# Patient Record
Sex: FEMALE | Race: BLACK OR AFRICAN AMERICAN | Employment: OTHER | URBAN - METROPOLITAN AREA
[De-identification: names, ages, dates, MRNs, and addresses within clinical notes are randomized per-mention and may not be internally consistent; named-entity substitution may affect disease eponyms.]

---

## 2017-01-01 ENCOUNTER — HOSPITAL ENCOUNTER (EMERGENCY)
Age: 72
Discharge: HOME | End: 2017-01-01
Attending: FAMILY MEDICINE
Payer: MEDICARE

## 2017-01-01 ENCOUNTER — APPOINTMENT (OUTPATIENT)
Dept: CT IMAGING | Age: 72
End: 2017-01-01
Attending: FAMILY MEDICINE
Payer: MEDICARE

## 2017-01-01 ENCOUNTER — APPOINTMENT (OUTPATIENT)
Dept: GENERAL RADIOLOGY | Age: 72
End: 2017-01-01
Attending: FAMILY MEDICINE
Payer: MEDICARE

## 2017-01-01 VITALS
SYSTOLIC BLOOD PRESSURE: 142 MMHG | HEIGHT: 59 IN | BODY MASS INDEX: 29.64 KG/M2 | HEART RATE: 77 BPM | DIASTOLIC BLOOD PRESSURE: 89 MMHG | TEMPERATURE: 97.4 F | RESPIRATION RATE: 16 BRPM | WEIGHT: 147 LBS | OXYGEN SATURATION: 99 %

## 2017-01-01 DIAGNOSIS — K62.89 PROCTITIS: ICD-10-CM

## 2017-01-01 DIAGNOSIS — K59.01 SLOW TRANSIT CONSTIPATION: Primary | ICD-10-CM

## 2017-01-01 LAB
ALBUMIN SERPL BCP-MCNC: 3.4 G/DL (ref 3.4–5)
ALBUMIN/GLOB SERPL: 0.8 {RATIO} (ref 0.8–1.7)
ALP SERPL-CCNC: 101 U/L (ref 45–117)
ALT SERPL-CCNC: 31 U/L (ref 13–56)
ANION GAP BLD CALC-SCNC: 10 MMOL/L (ref 3–18)
APPEARANCE UR: ABNORMAL
AST SERPL W P-5'-P-CCNC: 23 U/L (ref 15–37)
BACTERIA URNS QL MICRO: NEGATIVE /HPF
BASOPHILS # BLD AUTO: 0 K/UL (ref 0–0.06)
BASOPHILS # BLD: 0 % (ref 0–2)
BILIRUB SERPL-MCNC: 0.3 MG/DL (ref 0.2–1)
BILIRUB UR QL: NEGATIVE
BUN SERPL-MCNC: 27 MG/DL (ref 7–18)
BUN/CREAT SERPL: 18 (ref 12–20)
CALCIUM SERPL-MCNC: 9.4 MG/DL (ref 8.5–10.1)
CHLORIDE SERPL-SCNC: 102 MMOL/L (ref 100–108)
CO2 SERPL-SCNC: 24 MMOL/L (ref 21–32)
COLOR UR: YELLOW
CREAT SERPL-MCNC: 1.51 MG/DL (ref 0.6–1.3)
DIFFERENTIAL METHOD BLD: ABNORMAL
EOSINOPHIL # BLD: 0.3 K/UL (ref 0–0.4)
EOSINOPHIL NFR BLD: 3 % (ref 0–5)
EPITH CASTS URNS QL MICRO: NORMAL /LPF (ref 0–5)
ERYTHROCYTE [DISTWIDTH] IN BLOOD BY AUTOMATED COUNT: 13.6 % (ref 11.6–14.5)
GLOBULIN SER CALC-MCNC: 4.3 G/DL (ref 2–4)
GLUCOSE SERPL-MCNC: 105 MG/DL (ref 74–99)
GLUCOSE UR STRIP.AUTO-MCNC: NEGATIVE MG/DL
HCT VFR BLD AUTO: 45.8 % (ref 35–45)
HGB BLD-MCNC: 15.2 G/DL (ref 12–16)
HGB UR QL STRIP: NEGATIVE
KETONES UR QL STRIP.AUTO: NEGATIVE MG/DL
LEUKOCYTE ESTERASE UR QL STRIP.AUTO: ABNORMAL
LIPASE SERPL-CCNC: 146 U/L (ref 73–393)
LYMPHOCYTES # BLD AUTO: 29 % (ref 21–52)
LYMPHOCYTES # BLD: 2.6 K/UL (ref 0.9–3.6)
MCH RBC QN AUTO: 29.6 PG (ref 24–34)
MCHC RBC AUTO-ENTMCNC: 33.2 G/DL (ref 31–37)
MCV RBC AUTO: 89.3 FL (ref 74–97)
MONOCYTES # BLD: 0.6 K/UL (ref 0.05–1.2)
MONOCYTES NFR BLD AUTO: 7 % (ref 3–10)
NEUTS SEG # BLD: 5.6 K/UL (ref 1.8–8)
NEUTS SEG NFR BLD AUTO: 61 % (ref 40–73)
NITRITE UR QL STRIP.AUTO: NEGATIVE
PH UR STRIP: 6.5 [PH] (ref 5–8)
PLATELET # BLD AUTO: 335 K/UL (ref 135–420)
PMV BLD AUTO: 9.2 FL (ref 9.2–11.8)
POTASSIUM SERPL-SCNC: 4.3 MMOL/L (ref 3.5–5.5)
PROT SERPL-MCNC: 7.7 G/DL (ref 6.4–8.2)
PROT UR STRIP-MCNC: NEGATIVE MG/DL
RBC # BLD AUTO: 5.13 M/UL (ref 4.2–5.3)
RBC #/AREA URNS HPF: NORMAL /HPF (ref 0–5)
SODIUM SERPL-SCNC: 136 MMOL/L (ref 136–145)
SP GR UR REFRACTOMETRY: 1.01 (ref 1–1.03)
UROBILINOGEN UR QL STRIP.AUTO: 0.2 EU/DL (ref 0.2–1)
WBC # BLD AUTO: 9.1 K/UL (ref 4.6–13.2)
WBC URNS QL MICRO: NORMAL /HPF (ref 0–5)

## 2017-01-01 PROCEDURE — 99284 EMERGENCY DEPT VISIT MOD MDM: CPT

## 2017-01-01 PROCEDURE — 80053 COMPREHEN METABOLIC PANEL: CPT | Performed by: FAMILY MEDICINE

## 2017-01-01 PROCEDURE — 81001 URINALYSIS AUTO W/SCOPE: CPT | Performed by: FAMILY MEDICINE

## 2017-01-01 PROCEDURE — 74011250636 HC RX REV CODE- 250/636: Performed by: FAMILY MEDICINE

## 2017-01-01 PROCEDURE — 74011636320 HC RX REV CODE- 636/320: Performed by: EMERGENCY MEDICINE

## 2017-01-01 PROCEDURE — 74022 RADEX COMPL AQT ABD SERIES: CPT

## 2017-01-01 PROCEDURE — 74177 CT ABD & PELVIS W/CONTRAST: CPT

## 2017-01-01 PROCEDURE — 96375 TX/PRO/DX INJ NEW DRUG ADDON: CPT

## 2017-01-01 PROCEDURE — 96361 HYDRATE IV INFUSION ADD-ON: CPT

## 2017-01-01 PROCEDURE — 96374 THER/PROPH/DIAG INJ IV PUSH: CPT

## 2017-01-01 PROCEDURE — 83690 ASSAY OF LIPASE: CPT | Performed by: FAMILY MEDICINE

## 2017-01-01 PROCEDURE — 85025 COMPLETE CBC W/AUTO DIFF WBC: CPT | Performed by: FAMILY MEDICINE

## 2017-01-01 RX ORDER — IODIXANOL 320 MG/ML
80 INJECTION, SOLUTION INTRAVASCULAR
Status: CANCELLED | OUTPATIENT
Start: 2017-01-01 | End: 2017-01-01

## 2017-01-01 RX ORDER — IODIXANOL 320 MG/ML
50-100 INJECTION, SOLUTION INTRAVASCULAR
Status: COMPLETED | OUTPATIENT
Start: 2017-01-01 | End: 2017-01-01

## 2017-01-01 RX ORDER — HYDROCORTISONE 100 MG/60ML
100 SUSPENSION RECTAL 2 TIMES DAILY
Qty: 10 ENEMA | Refills: 0 | Status: SHIPPED | OUTPATIENT
Start: 2017-01-01 | End: 2017-01-06

## 2017-01-01 RX ORDER — SUCRALFATE 1 G/1
1 TABLET ORAL 4 TIMES DAILY
Qty: 60 TAB | Refills: 2 | Status: ON HOLD | OUTPATIENT
Start: 2017-01-01 | End: 2021-07-07 | Stop reason: SDUPTHER

## 2017-01-01 RX ORDER — MORPHINE SULFATE 2 MG/ML
2 INJECTION, SOLUTION INTRAMUSCULAR; INTRAVENOUS ONCE
Status: COMPLETED | OUTPATIENT
Start: 2017-01-01 | End: 2017-01-01

## 2017-01-01 RX ORDER — METRONIDAZOLE 500 MG/1
500 TABLET ORAL 3 TIMES DAILY
Qty: 21 TAB | Refills: 0 | Status: SHIPPED | OUTPATIENT
Start: 2017-01-01 | End: 2017-01-08

## 2017-01-01 RX ORDER — CIPROFLOXACIN 500 MG/1
500 TABLET ORAL 2 TIMES DAILY
Qty: 14 TAB | Refills: 0 | Status: SHIPPED | OUTPATIENT
Start: 2017-01-01 | End: 2017-01-08

## 2017-01-01 RX ORDER — ONDANSETRON 2 MG/ML
4 INJECTION INTRAMUSCULAR; INTRAVENOUS ONCE
Status: COMPLETED | OUTPATIENT
Start: 2017-01-01 | End: 2017-01-01

## 2017-01-01 RX ADMIN — ONDANSETRON 4 MG: 2 INJECTION INTRAMUSCULAR; INTRAVENOUS at 05:37

## 2017-01-01 RX ADMIN — IODIXANOL 75 ML: 320 INJECTION, SOLUTION INTRAVASCULAR at 07:29

## 2017-01-01 RX ADMIN — SODIUM CHLORIDE 1000 ML: 900 INJECTION, SOLUTION INTRAVENOUS at 06:55

## 2017-01-01 RX ADMIN — SODIUM CHLORIDE 1000 ML: 900 INJECTION, SOLUTION INTRAVENOUS at 05:37

## 2017-01-01 RX ADMIN — Medication 2 MG: at 05:37

## 2017-01-01 NOTE — ED NOTES
I have reviewed discharge instructions with the patient. The patient verbalized understanding.  Patient armband removed and shredded

## 2017-01-01 NOTE — ED NOTES
Hourly Rounding:  Pt resting in NAD, RR equal and non-labored, side rails up x 2, bed in lowest position, call bell within reach, VS updated, pt states still unable to void, no needs at this time, pt son remains at bedside.

## 2017-01-01 NOTE — ED PROVIDER NOTES
[address]  EMERGENCY DEPARTMENT HISTORY AND PHYSICAL EXAM       Date: 1/1/2017   Patient Name: Jayden Simmons   YOB: 1945  Medical Record Number: [id number]    History of Presenting Illness     Chief Complaint   Patient presents with    Constipation        History Provided By:  patient    Additional History:   5:13 AM  Jayden Simmons is a 73 y.o. female who presents to the emergency department C/O abdominal pain (Rated 10/10) onset earlier today. Associated symptoms include constipation and blood in stool. States she has tried taking a suppository Dulcolax with no relief. Pt just had a small BM in ED with dark red blood. Pt has had similar pain before and was told to take stool softener. PMHx of HTN, HLD, and blood clots. PSHx of abdominal tumor removal. Pt is currently taking Aspirin daily. Pt denies fever, chills, N/V, tobacco use, EtOH use, and any other Sx or complaints. Primary Care Provider: Donell Parekh MD   Specialist:    Past History     Past Medical History:   Past Medical History   Diagnosis Date    Arthritis     Blood clot in vein 2004     left leg     [facility]     Cancer ([facility]      right breast    GERD (gastroesophageal reflux disease)     High cholesterol     Hypertension     Other ill-defined conditions(799.89)      high cholestrol    Vertigo         Past Surgical History:   Past Surgical History   Procedure Laterality Date    Hx knee replacement      Hx breast lumpectomy      Hx tumor removal      Pr abdomen surgery proc unlisted       removal of tumor abdomen    Hx orthopaedic       total knee replacement bilateral    Hx orthopaedic       excision of cyst left hand    Hx tubal ligation      Hx gi       colonosocpy        Family History:   History reviewed. No pertinent family history.      Social History:   Social History   Substance Use Topics    Smoking status: Passive Smoke Exposure - Never Smoker    Smokeless tobacco: Never Used    Alcohol use No        Allergies: Allergies   Allergen Reactions    Amiloride Nausea and Vomiting    Amoxicillin Rash    Bactrim [Sulfamethoprim Ds] Unknown (comments)    Gabapentin Rash    Ibuprofen Unknown (comments)    Zestoretic [Lisinopril-Hydrochlorothiazide] Unknown (comments)        Review of Systems   Review of Systems   Constitutional: Negative for chills and fever. Gastrointestinal: Positive for abdominal pain, blood in stool and constipation. Negative for nausea and vomiting. All other systems reviewed and are negative. Physical Exam  Vitals:    01/01/17 0500 01/01/17 0532 01/01/17 0651   BP: (!) 138/94 138/89 135/75   Pulse: 96 80 77   Resp: 18 16 16   Temp: 97.4 °F (36.3 °C)     SpO2: 100% 100% 100%   Weight: 66.7 kg (147 lb)     Height: 4' 11\" (1.499 m)         Physical Exam   Nursing note and vitals reviewed. Vital signs and nursing notes reviewed    CONSTITUTIONAL: Short. Middle aged, over weight female. Alert, in moderate pain; well-developed; well-nourished. HEAD:  Normocephalic, atraumatic  EYES: PERRL; EOM's intact. ENTM: Nose: no rhinorrhea; Throat: no erythema or exudate, mucous membranes moist  Neck:  No JVD, supple without lymphadenopathy  RESP: Chest clear, equal breath sounds. CV: S1 and S2 WNL; No murmurs, gallops or rubs. GI: Normal bowel sounds, abdomen soft and tenderness in lower abdomen. Surgical scar on abdomen. No masses or organomegaly. RECTAL: No masses, no hemorrhoids. : No costo-vertebral angle tenderness. BACK:  Non-tender  UPPER EXT:  Normal inspection  LOWER EXT: No edema, no calf tenderness. Distal pulses intact. NEURO: CN intact, reflexes 2/4 and sym, strength 5/5 and sym, sensation intact. SKIN: No rashes; Normal for age and stage. PSYCH:  Alert and oriented, normal affect.         Diagnostic Study Results     Labs -      Recent Results (from the past 12 hour(s))   CBC WITH AUTOMATED DIFF    Collection Time: 01/01/17 5:38 AM   Result Value Ref Range    WBC 9.1 4.6 - 13.2 K/uL    RBC 5.13 4.20 - 5.30 M/uL    HGB 15.2 12.0 - 16.0 g/dL    HCT 45.8 (H) 35.0 - 45.0 %    MCV 89.3 74.0 - 97.0 FL    MCH 29.6 24.0 - 34.0 PG    MCHC 33.2 31.0 - 37.0 g/dL    RDW 13.6 11.6 - 14.5 %    PLATELET 245 433 - 890 K/uL    MPV 9.2 9.2 - 11.8 FL    NEUTROPHILS 61 40 - 73 %    LYMPHOCYTES 29 21 - 52 %    MONOCYTES 7 3 - 10 %    EOSINOPHILS 3 0 - 5 %    BASOPHILS 0 0 - 2 %    ABS. NEUTROPHILS 5.6 1.8 - 8.0 K/UL    ABS. LYMPHOCYTES 2.6 0.9 - 3.6 K/UL    ABS. MONOCYTES 0.6 0.05 - 1.2 K/UL    ABS. EOSINOPHILS 0.3 0.0 - 0.4 K/UL    ABS. BASOPHILS 0.0 0.0 - 0.06 K/UL    DF AUTOMATED     METABOLIC PANEL, COMPREHENSIVE    Collection Time: 01/01/17  5:38 AM   Result Value Ref Range    Sodium 136 136 - 145 mmol/L    Potassium 4.3 3.5 - 5.5 mmol/L    Chloride 102 100 - 108 mmol/L    CO2 24 21 - 32 mmol/L    Anion gap 10 3.0 - 18 mmol/L    Glucose 105 (H) 74 - 99 mg/dL    BUN 27 (H) 7.0 - 18 MG/DL    Creatinine 1.51 (H) 0.6 - 1.3 MG/DL    BUN/Creatinine ratio 18 12 - 20      GFR est AA 41 (L) >60 ml/min/1.73m2    GFR est non-AA 34 (L) >60 ml/min/1.73m2    Calcium 9.4 8.5 - 10.1 MG/DL    Bilirubin, total 0.3 0.2 - 1.0 MG/DL    ALT 31 13 - 56 U/L    AST 23 15 - 37 U/L    Alk.  phosphatase 101 45 - 117 U/L    Protein, total 7.7 6.4 - 8.2 g/dL    Albumin 3.4 3.4 - 5.0 g/dL    Globulin 4.3 (H) 2.0 - 4.0 g/dL    A-G Ratio 0.8 0.8 - 1.7     LIPASE    Collection Time: 01/01/17  5:38 AM   Result Value Ref Range    Lipase 146 73 - 393 U/L   URINALYSIS W/ RFLX MICROSCOPIC    Collection Time: 01/01/17  7:36 AM   Result Value Ref Range    Color YELLOW      Appearance CLOUDY      Specific gravity 1.012 1.005 - 1.030      pH (UA) 6.5 5.0 - 8.0      Protein NEGATIVE  NEG mg/dL    Glucose NEGATIVE  NEG mg/dL    Ketone NEGATIVE  NEG mg/dL    Bilirubin NEGATIVE  NEG      Blood NEGATIVE  NEG      Urobilinogen 0.2 0.2 - 1.0 EU/dL    Nitrites NEGATIVE  NEG      Leukocyte Esterase TRACE (A) NEG     URINE MICROSCOPIC ONLY    Collection Time: 01/01/17  7:36 AM   Result Value Ref Range    WBC 0 to 1 0 - 5 /hpf    RBC NONE 0 - 5 /hpf    Epithelial cells FEW 0 - 5 /lpf    Bacteria NEGATIVE  NEG /hpf       Radiologic Studies -    RADIOLOGY FINDINGS  X-ray abdomen with chest shows no acute process. Moderate stool noted. Pending review by Radiologist  Recorded by Leonard Hubbard, CLAUDE Scribe, as dictated by Lakeisha Clark MD    CT ABD PELV W CONT   Final Result   Circumferential mural thickening throughout the rectum and anus with adjacent   presacral inflammatory stranding. Findings are most suggestive of proctitis,  probably infectious. No acute abnormality otherwise throughout the abdomen or  Pelvis. As read by the radiologist.     XR ABD ACUTE W 1 V CHEST    (Results Pending)     Medical Decision Making   I am the first provider for this patient. I reviewed the vital signs, available nursing notes, past medical history, past surgical history, family history and social history. INITIAL CLINICAL IMPRESSION and PLANS:  The patient presents with the primary complaint(s) of: abdominal pain. The presentation, to include historical aspects and clinical findings are consistent with the DX of constipation. However, other possible DX's to consider as primary, associated with, or exacerbated by include:    1. Diverticulitis  2. UTI  3. Bowel obstruction    Considering the above, my initial management plan to evaluate and therapeutic interventions include the following and as noted in the orders:    1. Labs: CBC, CMP, Lipase, UA  2. Imaging: XR abdomen with chest, CT A/P    Vital Signs-Reviewed the patient's vital signs.    Patient Vitals for the past 12 hrs:   Temp Pulse Resp BP SpO2   01/01/17 0651 - 77 16 135/75 100 %   01/01/17 0532 - 80 16 138/89 100 %   01/01/17 0500 97.4 °F (36.3 °C) 96 18 (!) 138/94 100 %       Pulse Oximetry Analysis - Normal 100% on room air     Old Medical Records: Old medical records. Procedure:    PROCEDURE NOTE - RECTAL EXAM:   6:29 AM  Performed by: Mook Mesa MD  Rectal exam performed. Brown stool was collected. Stool was Hemoccult tested, and found to be heme Negative. The procedure took 1-15 minutes, and pt tolerated well. ED Course:      Medications Given in the ED:  Medications   sodium chloride 0.9 % bolus infusion 1,000 mL (0 mL IntraVENous IV Completed 17 0651)   morphine injection 2 mg (2 mg IntraVENous Given 17 0537)   ondansetron (ZOFRAN) injection 4 mg (4 mg IntraVENous Given 17 0537)   sodium chloride 0.9 % bolus infusion 1,000 mL (0 mL IntraVENous IV Completed 17 0752)   iodixanol (VISIPAQUE) 320 mg iodine/mL contrast injection  mL (75 mL IntraVENous Given 17 7753)        PROGRESS NOTE:  5:13 AM   Initial assessment performed. PROGRESS NOTE:   6:33 AM  Pt has been re-examined by Mook Mesa MD. Will get CT A/P scan to rule out diverticulitis. BEDSIDE TRANSFER OF CARE:  7:00 AM  Patient care will be transferred from Mook Mesa MD to Gillian Duggan MD.  Discussed available diagnostic results and care plan at length at beside. Patient and family made aware of provider change. All questions answered. Patient and family voiced understanding. Written by Agueda Morales ED Scribe, as dictated by Mook Mesa MD.    Discharge Note:  8:59 AM   Pt has been reexamined. Patient has no new complaints, changes, or physical findings. Care plan outlined and precautions discussed. Results were reviewed with the patient. All medications were reviewed with the patient; will d/c home with discharge instructions and Cortenema, Flagyl, and Carafate. All of pt's questions and concerns were addressed. Patient was instructed and agrees to follow up with PCP, as well as to return to the ED upon further deterioration. Patient is ready to go home. Diagnosis   Clinical Impression:   1.  Slow transit constipation    2. Proctitis         Discussion:      Follow-up Information     Follow up With Details Comments Contact Info    Jenifer Landry MD Schedule an appointment as soon as possible for a visit in 2 days  [address] [facility] [address]      [facility] EMERGENCY DEPT  As needed, If symptoms worsen [address] [address]  [phone]          Current Discharge Medication List      START taking these medications    Details   metroNIDAZOLE (FLAGYL) 500 mg tablet Take 1 Tab by mouth three (3) times daily for 7 days. Qty: 21 Tab, Refills: 0      hydrocortisone (CORTENEMA) 100 mg/60 mL enema Insert 1 Enema into rectum two (2) times a day for 5 days. Qty: 10 Enema, Refills: 0      sucralfate (CARAFATE) 1 gram tablet Take 1 Tab by mouth four (4) times daily. Qty: 60 Tab, Refills: 2         CONTINUE these medications which have NOT CHANGED    Details   HYDROcodone-acetaminophen (NORCO) 5-325 mg per tablet Take 1 Tab by mouth every four (4) hours as needed for Pain. Max Daily Amount: 6 Tabs. Qty: 20 Tab, Refills: 0      bisacodyl (DULCOLAX, BISACODYL,) 10 mg suppository Insert 10 mg into rectum daily. Qty: 12 suppository, Refills: 0      nortriptyline (PAMELOR) 25 mg capsule Take 25 mg by mouth nightly. glycerin, adult, suppository Insert 1 suppository into rectum daily as needed (constipation). Qty: 5 suppository, Refills: 0      lovastatin (MEVACOR) 40 mg tablet Take 40 mg by mouth nightly. cyclobenzaprine (FLEXERIL) 10 mg tablet Take 10 mg by mouth three (3) times daily as needed for Muscle Spasm(s). aspirin (ASPIRIN) 325 mg tablet Take 325 mg by mouth daily. losartan (COZAAR) 100 mg tablet Take 100 mg by mouth daily. famotidine (PEPCID) 20 mg tablet Take 20 mg by mouth two (2) times a day.        ezetimibe (ZETIA) 10 mg tablet Take 10 mg by mouth nightly.       promethazine (PHENERGAN) 25 mg tablet Take 1 Tab by mouth every six (6) hours as needed for Nausea. Qty: 12 Tab, Refills: 0      meclizine (ANTIVERT) 25 mg tablet One  q6h prn dizziness  Qty: 20 Tab, Refills: 1             _______________________________   Attestations: This note is prepared by Oscar Fraga, acting as a Scribe for Sandra Norton MD on 5:06 AM on 01/01/17    Sandra Norton MD: The scribe's documentation has been prepared under my direction and personally reviewed by me in its entirety.   _______________________________

## 2017-01-01 NOTE — ED NOTES
Pt c/o lower abdominal pain x 2 days. Pt states last BM was Saturday, and states \"I just had a sort of hard bowel movement, it was not a lot. \"  Pt states she also voided, so is unable to at present time. Pt is aware that urine specimen is needed.   Pt denies N/V.

## 2017-01-01 NOTE — Clinical Note
SPECIAL DISCHARGE INSTRUCTIONS AND COMMENTS: 
 
General comments: Thank you for allowing us to provide you with excellent care today. We hope we addressed all of your concerns and needs. We strive to provide excellent quality care in the Emergency Depart ment. If you feel that you have not received excellent quality care or timely care, please ask to speak to the nurse manager. Please choose us in the future for your continued health care needs. Follow-up comments: The exam and treatment you rece ived in the Emergency Department were for an urgent problem that may be new for you and / or one which may be related to a worsening of a chronic or ongoing medical problem that you already had prior to this visit. In any case, today's treatment is not i ntended to be considered as complete care in all cases and thus, it is important that you follow-up with a doctor, nurse practitioner, or physicians assistant to: (1) confirm your diagnosis, (2) re-evaluation of changes in your illness and treatment, an d (3) for ongoing care. In some cases we may have contacted your doctor or a specific specialist who may be involved in your future evaluation and care but in any case, take this sheet with you when you go to your follow-up visit or refer to the infor kylee on these sheets when you are calling to arrange an appointment - it may prove helpful in making the appointment. Prescribed Medications: Unless you have been directed by the provider to change your current medicines, you should continue to sheela e them as before. If you have been prescribed medicines, please take them as directed. In some cases, these new medicines are intended to replace a medicine that you are currently taking and if so, this will be noted below.  
 
 Our expectation is that y our condition will improve by following the doctor's recommendations, however, if in the event your condition worsens or does not improve as expected, please follow-up with your PCP or if unable to reach your usual health care provider, you should return  to the Emergency Department. We are available 24 hours a day. SPECIFIC INSTRUCTIONS PROVIDED BY YOUR DOCTOR, Laquita Cruz MD:  
SUGGESTIONS FOR TAKING CARE OF YOUR CONSTIPATION In the majority of cases, constipation is caused by the diet anatoly t you are on and / or medications that you are taking. In some cases, the bowels just don't work right. Regardless of the cause, the key to taking care of constipation is to prevent it. This is what you should consider: 
1) Drink more liquids 2) Eat m ore fruits and vegetable 3) Increase the amount of fiber in your diet, either by the foods you eat or supplemental fiber - METAMUCIL; COLONIX 
4) Consider taking COLACE, which will add bulk to your stool If you have already have some degree of consti pation, consider the followin) Laxatives: Milk of Magnesium; Prunes or Prune Juice, DULCOLAX; MIRALAX 2) Enemas If the constipation is really bad . .. Drastic measures are needed. If directed by the physician, consider the followin) Take 2 D ULCOLAX  Tabs at once followed 3 glasses of water. Then, 
2)  Prepare a high dose / concentration of Miralax by putting the entire contents of a standard bottle of Miralax (255 grams) in to 64 ounces of some sport drink, like Gatorade, and begin drinkin g 8 ounces every 15 minutes until the solution is gone. Another option is: after an hour of taking the Dulcolax  take a half of a bottle of MagCitrate with ice followed by water and then repeat in one hour. 3) After this continue drinking fluids a nd if there are no results, take an additional DULCOLAX tab. As an option to the Miralax (described above) you may want to consider taking a bottle of Magnesium Citrate - take 1/2 of the bottle with some ice and then repeat in 30 minutes. Drinking kiah er between these 2 doses. SUGGESTIONS FOR TAKING CARE OF YOUR CONSTIPATION In the majority of cases, constipation is caused by the diet that you are on and / or medications that you are taking. In some cases, the bowels just don't work right. Reg ardless of the cause, the key to taking care of constipation is to prevent it. This is what you should consider: 
1) Drink more liquids 2) Eat more fruits and vegetable 3) Increase the amount of fiber in your diet, either by the foods you eat or supple mental fiber - METAMUCIL; COLONIX 
4) Consider taking COLACE, which will add bulk to your stool

## 2017-01-01 NOTE — DISCHARGE INSTRUCTIONS
Anal Pain: Care Instructions  Your Care Instructions  Pain in the opening to the rectum (anus) can be caused by diarrhea or constipation or by scratching a rectal itch. A common cause of anal pain is a tear in the lining of the lower rectum (anal fissure). This type of anal pain usually goes away when the problem clears up. Injury during anal sex or from an object being placed in the rectum also can cause pain. A rare cause of anal pain is spasms of the muscles in the rectum. Some of these conditions may cause some light bleeding. Home treatment usually can relieve anal pain. If you continue to have anal pain, your doctor may prescribe medicine to relieve pain and other symptoms. Depending on the cause, you may need other treatment. Follow-up care is a key part of your treatment and safety. Be sure to make and go to all appointments, and call your doctor if you are having problems. It's also a good idea to know your test results and keep a list of the medicines you take. How can you care for yourself at home? · Sit in a few inches of warm water (sitz bath) 3 times a day and after bowel movements. The warm water eases discomfort. Do not put soaps, salts, or shampoos in the water. · Drink plenty of fluids, enough so that your urine is light yellow or clear like water. If you have kidney, heart, or liver disease and have to limit fluids, talk with your doctor before you increase the amount of fluids you drink. · Include high-fiber foods, such as fruits, vegetables, beans, and whole grains, in your diet each day. · Take a fiber supplement, such as Benefiber, Citrucel, or Metamucil, every day. Read and follow all instructions on the label. · Use the toilet when you feel the urge. Or when you can, schedule time each day for a bowel movement. A daily routine may help. Take your time and do not strain when having a bowel movement. But do not sit on the toilet too long.   · Support your feet with a small step stool when you sit on the toilet. This helps flex your hips and places your pelvis in a squatting position. · Your doctor may recommend an over-the-counter laxative, such as Miralax, Milk of Magnesia, or Ex-Lax. Read and follow all instructions on the label, and do not use laxatives on a long-term basis. · Do not use over-the-counter ointments or creams without talking to your doctor. Some of these may not help. · Use baby wipes or medicated pads, such as Preparation H or Tucks, instead of toilet paper to clean after a bowel movement. These products do not irritate the anus. · Be safe with medicines. Read and follow all instructions on the label. If the doctor gave you a prescription medicine for pain, take it as prescribed. If you are not taking a prescription pain medicine, ask your doctor if you can take an over-the-counter medicine. When should you call for help? Call 911 anytime you think you may need emergency care. For example, call if:  · You passed out (lost consciousness). Call your doctor now or seek immediate medical care if:  · You have a fever. · You have swelling, a lump, a sore, or a new growth in or around your anus. · Your stools are black and tarlike or have streaks of blood. Watch closely for changes in your health, and be sure to contact your doctor if:  · You do not get better as expected. Where can you learn more? Go to http://cathleen-aniyah.info/. Enter 612 [id number] in the search box to learn more about \"Anal Pain: Care Instructions. \"  Current as of: August 9, 2016  Content Version: 11.1  © 9715-1022 [employer]. Care instructions adapted under license by [employer] (which disclaims liability or warranty for this information). If you have questions about a medical condition or this instruction, always ask your healthcare professional. [address] any warranty or liability for your use of this information. Constipation: Care Instructions  Your Care Instructions  Constipation means that you have a hard time passing stools (bowel movements). People pass stools from 3 times a day to once every 3 days. What is normal for you may be different. Constipation may occur with pain in the rectum and cramping. The pain may get worse when you try to pass stools. Sometimes there are small amounts of bright red blood on toilet paper or the surface of stools. This is because of enlarged veins near the rectum (hemorrhoids). A few changes in your diet and lifestyle may help you avoid ongoing constipation. Your doctor may also prescribe medicine to help loosen your stool. Some medicines can cause constipation. These include pain medicines and antidepressants. Tell your doctor about all the medicines you take. Your doctor may want to make a medicine change to ease your symptoms. Follow-up care is a key part of your treatment and safety. Be sure to make and go to all appointments, and call your doctor if you are having problems. It's also a good idea to know your test results and keep a list of the medicines you take. How can you care for yourself at home? · Drink plenty of fluids, enough so that your urine is light yellow or clear like water. If you have kidney, heart, or liver disease and have to limit fluids, talk with your doctor before you increase the amount of fluids you drink. · Include high-fiber foods in your diet each day. These include fruits, vegetables, beans, and whole grains. · Get at least 30 minutes of exercise on most days of the week. Walking is a good choice. You also may want to do other activities, such as running, swimming, cycling, or playing tennis or team sports. · Take a fiber supplement, such as Citrucel or Metamucil, every day. Read and follow all instructions on the label. · Schedule time each day for a bowel movement. A daily routine may help. Take your time having your bowel movement.   · Support your feet with a small step stool when you sit on the toilet. This helps flex your hips and places your pelvis in a squatting position. · Your doctor may recommend an over-the-counter laxative to relieve your constipation. Examples are Milk of Magnesia and MiraLax. Read and follow all instructions on the label. Do not use laxatives on a long-term basis. When should you call for help? Call your doctor now or seek immediate medical care if:  · You have new or worse belly pain. · You have new or worse nausea or vomiting. · You have blood in your stools. Watch closely for changes in your health, and be sure to contact your doctor if:  · Your constipation is getting worse. · You do not get better as expected. Where can you learn more? Go to http://cathleen-aniyah.info/. Enter [phone] in the search box to learn more about \"Constipation: Care Instructions. \"  Current as of: May 27, 2016  Content Version: 11.1  © 5413-5075 [employer]. Care instructions adapted under license by [employer] (which disclaims liability or warranty for this information). If you have questions about a medical condition or this instruction, always ask your healthcare professional. [place] [address] any warranty or liability for your use of this information. Proctitis: Care Instructions  Your Care Instructions  Proctitis is inflammation of the lining of the rectum. It can be a short-term or long-term problem. Many things can cause proctitis. It may be a side effect of medical treatments, such as radiation therapy or antibiotics. Some sexually transmitted diseases may also cause proctitis. It may be related to ulcerative colitis or to Crohn's disease. Other causes include bacterial infection, allergies, or injury or nerve problems in the rectum. Common symptoms include pain or itching in the rectum and a constant or frequent strong need to have a bowel movement.  You may have a change in bowel habits; a fever; and mucus, blood, or pus in your stools. Follow-up care is a key part of your treatment and safety. Be sure to make and go to all appointments, and call your doctor if you are having problems. Its also a good idea to know your test results and keep a list of the medicines you take. How can you care for yourself at home? · Take your medicines exactly as prescribed. Call your doctor if you think you are having a problem with your medicine. You will get more details on the specific medicines your doctor prescribes. · If your doctor prescribed antibiotics, take them as directed. Do not stop taking them just because you feel better. You need to take the full course of antibiotics. · Some complementary treatments may help. These include acupuncture, herbal remedies, and diet supplements. Be sure to talk to your doctor before you use any complementary treatment. · Avoid anal intercourse. This will prevent further damage to the anal canal and give it time to heal.  · Avoid foods that seem to make your symptoms worse. Common problem foods include dairy products, foods and drinks that contain caffeine, and high-fat foods. These foods can irritate the digestive tract and make conditions like ulcerative colitis worse. · Take steps to reduce stress. Exercises such as yoga or araceli chi can help you deal with stress. Talk to your doctor about these and other methods of reducing stress. When should you call for help? Call 911 anytime you think you may need emergency care. For example, call if:  · You passed out (lost consciousness). · You pass maroon or very bloody stools. Call your doctor now or seek immediate medical care if:  · You have belly pain that gets worse. · You have fever, chills, or body aches. · You have nausea or vomiting. · Your stools are black and tarlike or have streaks of blood.   Watch closely for changes in your health, and be sure to contact your doctor if:  · You do not get better as expected. Where can you learn more? Go to http://cathleen-aniyah.info/. Enter I641 in the search box to learn more about \"Proctitis: Care Instructions. \"  Current as of: August 9, 2016  Content Version: 11.1  © 5321-1310 [employer]. Care instructions adapted under license by [employer] (which disclaims liability or warranty for this information). If you have questions about a medical condition or this instruction, always ask your healthcare professional. [place] [address] any warranty or liability for your use of this information. Constipation: Care Instructions  Your Care Instructions  Constipation means that you have a hard time passing stools (bowel movements). People pass stools from 3 times a day to once every 3 days. What is normal for you may be different. Constipation may occur with pain in the rectum and cramping. The pain may get worse when you try to pass stools. Sometimes there are small amounts of bright red blood on toilet paper or the surface of stools. This is because of enlarged veins near the rectum (hemorrhoids). A few changes in your diet and lifestyle may help you avoid ongoing constipation. Your doctor may also prescribe medicine to help loosen your stool. Some medicines can cause constipation. These include pain medicines and antidepressants. Tell your doctor about all the medicines you take. Your doctor may want to make a medicine change to ease your symptoms. Follow-up care is a key part of your treatment and safety. Be sure to make and go to all appointments, and call your doctor if you are having problems. It's also a good idea to know your test results and keep a list of the medicines you take. How can you care for yourself at home? · Drink plenty of fluids, enough so that your urine is light yellow or clear like water.  If you have kidney, heart, or liver disease and have to limit fluids, talk with your doctor before you increase the amount of fluids you drink. · Include high-fiber foods in your diet each day. These include fruits, vegetables, beans, and whole grains. · Get at least 30 minutes of exercise on most days of the week. Walking is a good choice. You also may want to do other activities, such as running, swimming, cycling, or playing tennis or team sports. · Take a fiber supplement, such as Citrucel or Metamucil, every day. Read and follow all instructions on the label. · Schedule time each day for a bowel movement. A daily routine may help. Take your time having your bowel movement. · Support your feet with a small step stool when you sit on the toilet. This helps flex your hips and places your pelvis in a squatting position. · Your doctor may recommend an over-the-counter laxative to relieve your constipation. Examples are Milk of Magnesia and MiraLax. Read and follow all instructions on the label. Do not use laxatives on a long-term basis. When should you call for help? Call your doctor now or seek immediate medical care if:  · You have new or worse belly pain. · You have new or worse nausea or vomiting. · You have blood in your stools. Watch closely for changes in your health, and be sure to contact your doctor if:  · Your constipation is getting worse. · You do not get better as expected. Where can you learn more? Go to http://cathleen-aniyah.info/. Enter 21 [phone] in the search box to learn more about \"Constipation: Care Instructions. \"  Current as of: May 27, 2016  Content Version: 11.1  © 9938-5933 [employer]. Care instructions adapted under license by [employer] (which disclaims liability or warranty for this information). If you have questions about a medical condition or this instruction, always ask your healthcare professional. [address] any warranty or liability for your use of this information.

## 2017-01-01 NOTE — ED NOTES
Verbal shift change report given to HITESH Mojica, RN (oncoming nurse) by Flores Childress. Pierce Guthrie RN (offgoing nurse). Report included the following information SBAR, Kardex, ED Summary, Procedure Summary, Intake/Output, MAR, Recent Results and Med Rec Status. Patient currently off unit in [place].

## 2018-01-15 ENCOUNTER — APPOINTMENT (OUTPATIENT)
Dept: GENERAL RADIOLOGY | Age: 73
DRG: 871 | End: 2018-01-15
Attending: EMERGENCY MEDICINE
Payer: MEDICARE

## 2018-01-15 ENCOUNTER — HOSPITAL ENCOUNTER (INPATIENT)
Age: 73
LOS: 11 days | Discharge: HOME HEALTH | DRG: 871 | End: 2018-01-26
Attending: EMERGENCY MEDICINE | Admitting: INTERNAL MEDICINE
Payer: MEDICARE

## 2018-01-15 ENCOUNTER — APPOINTMENT (OUTPATIENT)
Dept: CT IMAGING | Age: 73
DRG: 871 | End: 2018-01-15
Attending: EMERGENCY MEDICINE
Payer: MEDICARE

## 2018-01-15 DIAGNOSIS — N28.9 RENAL INSUFFICIENCY: ICD-10-CM

## 2018-01-15 DIAGNOSIS — N30.00 ACUTE CYSTITIS WITHOUT HEMATURIA: ICD-10-CM

## 2018-01-15 DIAGNOSIS — L03.311 CELLULITIS OF ABDOMINAL WALL: ICD-10-CM

## 2018-01-15 DIAGNOSIS — L03.315 CELLULITIS OF PERINEUM: ICD-10-CM

## 2018-01-15 DIAGNOSIS — L03.314 CELLULITIS OF GROIN: ICD-10-CM

## 2018-01-15 DIAGNOSIS — A41.9 SEPSIS, DUE TO UNSPECIFIED ORGANISM: Primary | ICD-10-CM

## 2018-01-15 PROBLEM — E86.0 MODERATE DEHYDRATION: Status: ACTIVE | Noted: 2018-01-15

## 2018-01-15 PROBLEM — K21.9 GERD (GASTROESOPHAGEAL REFLUX DISEASE): Status: ACTIVE | Noted: 2018-01-15

## 2018-01-15 PROBLEM — N39.0 UTI (URINARY TRACT INFECTION): Status: ACTIVE | Noted: 2018-01-15

## 2018-01-15 PROBLEM — E78.5 HLD (HYPERLIPIDEMIA): Status: ACTIVE | Noted: 2018-01-15

## 2018-01-15 PROBLEM — I50.32 DIASTOLIC CHF, CHRONIC (HCC): Status: ACTIVE | Noted: 2018-01-15

## 2018-01-15 PROBLEM — R65.10 SIRS (SYSTEMIC INFLAMMATORY RESPONSE SYNDROME) (HCC): Status: ACTIVE | Noted: 2018-01-15

## 2018-01-15 LAB
ALBUMIN SERPL-MCNC: 2 G/DL (ref 3.4–5)
ALBUMIN/GLOB SERPL: 0.6 {RATIO} (ref 0.8–1.7)
ALP SERPL-CCNC: 65 U/L (ref 45–117)
ALT SERPL-CCNC: 55 U/L (ref 13–56)
ANION GAP BLD CALC-SCNC: 17 MMOL/L (ref 10–20)
ANION GAP SERPL CALC-SCNC: 9 MMOL/L (ref 3–18)
APPEARANCE UR: ABNORMAL
AST SERPL-CCNC: 43 U/L (ref 15–37)
BACTERIA URNS QL MICRO: ABNORMAL /HPF
BASOPHILS # BLD: 0 K/UL (ref 0–0.1)
BASOPHILS NFR BLD: 0 % (ref 0–3)
BILIRUB SERPL-MCNC: 0.7 MG/DL (ref 0.2–1)
BILIRUB UR QL: ABNORMAL
BNP SERPL-MCNC: 402 PG/ML (ref 0–900)
BUN BLD-MCNC: 42 MG/DL (ref 7–18)
BUN SERPL-MCNC: 45 MG/DL (ref 7–18)
BUN/CREAT SERPL: 17 (ref 12–20)
CA-I BLD-MCNC: 1.1 MMOL/L (ref 1.12–1.32)
CALCIUM SERPL-MCNC: 8 MG/DL (ref 8.5–10.1)
CHLORIDE BLD-SCNC: 101 MMOL/L (ref 100–108)
CHLORIDE SERPL-SCNC: 102 MMOL/L (ref 100–108)
CK MB CFR SERPL CALC: 0.7 % (ref 0–4)
CK MB SERPL-MCNC: 5.4 NG/ML (ref 5–25)
CK SERPL-CCNC: 791 U/L (ref 26–192)
CO2 BLD-SCNC: 24 MMOL/L (ref 19–24)
CO2 SERPL-SCNC: 25 MMOL/L (ref 21–32)
COLOR UR: ABNORMAL
CREAT SERPL-MCNC: 2.62 MG/DL (ref 0.6–1.3)
CREAT UR-MCNC: 2.6 MG/DL (ref 0.6–1.3)
DIFFERENTIAL METHOD BLD: ABNORMAL
EOSINOPHIL # BLD: 1.3 K/UL (ref 0–0.4)
EOSINOPHIL NFR BLD: 9 % (ref 0–5)
EPITH CASTS URNS QL MICRO: ABNORMAL /LPF (ref 0–5)
ERYTHROCYTE [DISTWIDTH] IN BLOOD BY AUTOMATED COUNT: 14.8 % (ref 11.6–14.5)
GLOBULIN SER CALC-MCNC: 3.6 G/DL (ref 2–4)
GLUCOSE BLD STRIP.AUTO-MCNC: 127 MG/DL (ref 74–106)
GLUCOSE SERPL-MCNC: 126 MG/DL (ref 74–99)
GLUCOSE UR STRIP.AUTO-MCNC: NEGATIVE MG/DL
HCT VFR BLD AUTO: 44.9 % (ref 35–45)
HCT VFR BLD CALC: 47 % (ref 36–49)
HGB BLD-MCNC: 15.2 G/DL (ref 12–16)
HGB BLD-MCNC: 16 G/DL (ref 12–16)
HGB UR QL STRIP: ABNORMAL
HYALINE CASTS URNS QL MICRO: ABNORMAL /LPF (ref 0–2)
INR PPP: 1.5 (ref 0.8–1.2)
KETONES UR QL STRIP.AUTO: ABNORMAL MG/DL
LACTATE SERPL-SCNC: 2.8 MMOL/L (ref 0.4–2)
LEUKOCYTE ESTERASE UR QL STRIP.AUTO: ABNORMAL
LYMPHOCYTES # BLD: 2.4 K/UL (ref 0.8–3.5)
LYMPHOCYTES NFR BLD: 17 % (ref 20–51)
MCH RBC QN AUTO: 30.5 PG (ref 24–34)
MCHC RBC AUTO-ENTMCNC: 33.9 G/DL (ref 31–37)
MCV RBC AUTO: 90 FL (ref 74–97)
MONOCYTES # BLD: 1 K/UL (ref 0–1)
MONOCYTES NFR BLD: 7 % (ref 2–9)
MUCOUS THREADS URNS QL MICRO: ABNORMAL /LPF
NEUTS BAND NFR BLD MANUAL: 10 % (ref 0–5)
NEUTS SEG # BLD: 8.2 K/UL (ref 1.8–8)
NEUTS SEG NFR BLD: 57 % (ref 42–75)
NITRITE UR QL STRIP.AUTO: POSITIVE
PH UR STRIP: 5 [PH] (ref 5–8)
PLATELET # BLD AUTO: 164 K/UL (ref 135–420)
PLATELET COMMENTS,PCOM: ABNORMAL
PMV BLD AUTO: 10.2 FL (ref 9.2–11.8)
POTASSIUM BLD-SCNC: 4.6 MMOL/L (ref 3.5–5.5)
POTASSIUM SERPL-SCNC: 4.6 MMOL/L (ref 3.5–5.5)
PROT SERPL-MCNC: 5.6 G/DL (ref 6.4–8.2)
PROT UR STRIP-MCNC: ABNORMAL MG/DL
PROTHROMBIN TIME: 17.2 SEC (ref 11.5–15.2)
RBC # BLD AUTO: 4.99 M/UL (ref 4.2–5.3)
RBC #/AREA URNS HPF: ABNORMAL /HPF (ref 0–5)
RBC MORPH BLD: ABNORMAL
SODIUM BLD-SCNC: 136 MMOL/L (ref 136–145)
SODIUM SERPL-SCNC: 136 MMOL/L (ref 136–145)
SP GR UR REFRACTOMETRY: 1.03 (ref 1–1.03)
TROPONIN I SERPL-MCNC: 0.04 NG/ML (ref 0–0.06)
UROBILINOGEN UR QL STRIP.AUTO: 1 EU/DL (ref 0.2–1)
WBC # BLD AUTO: 14.3 K/UL (ref 4.6–13.2)
WBC MORPH BLD: ABNORMAL
WBC URNS QL MICRO: ABNORMAL /HPF (ref 0–5)

## 2018-01-15 PROCEDURE — 80047 BASIC METABLC PNL IONIZED CA: CPT

## 2018-01-15 PROCEDURE — 71045 X-RAY EXAM CHEST 1 VIEW: CPT

## 2018-01-15 PROCEDURE — 80053 COMPREHEN METABOLIC PANEL: CPT | Performed by: EMERGENCY MEDICINE

## 2018-01-15 PROCEDURE — 96376 TX/PRO/DX INJ SAME DRUG ADON: CPT

## 2018-01-15 PROCEDURE — 81001 URINALYSIS AUTO W/SCOPE: CPT | Performed by: EMERGENCY MEDICINE

## 2018-01-15 PROCEDURE — 83605 ASSAY OF LACTIC ACID: CPT | Performed by: EMERGENCY MEDICINE

## 2018-01-15 PROCEDURE — 96365 THER/PROPH/DIAG IV INF INIT: CPT

## 2018-01-15 PROCEDURE — 74011250636 HC RX REV CODE- 250/636: Performed by: EMERGENCY MEDICINE

## 2018-01-15 PROCEDURE — 85610 PROTHROMBIN TIME: CPT | Performed by: EMERGENCY MEDICINE

## 2018-01-15 PROCEDURE — 87040 BLOOD CULTURE FOR BACTERIA: CPT | Performed by: EMERGENCY MEDICINE

## 2018-01-15 PROCEDURE — 99285 EMERGENCY DEPT VISIT HI MDM: CPT

## 2018-01-15 PROCEDURE — 96361 HYDRATE IV INFUSION ADD-ON: CPT

## 2018-01-15 PROCEDURE — 65660000000 HC RM CCU STEPDOWN

## 2018-01-15 PROCEDURE — 74011250637 HC RX REV CODE- 250/637: Performed by: INTERNAL MEDICINE

## 2018-01-15 PROCEDURE — 96375 TX/PRO/DX INJ NEW DRUG ADDON: CPT

## 2018-01-15 PROCEDURE — 93005 ELECTROCARDIOGRAM TRACING: CPT

## 2018-01-15 PROCEDURE — 77030005514 HC CATH URETH FOL14 BARD -A

## 2018-01-15 PROCEDURE — 96366 THER/PROPH/DIAG IV INF ADDON: CPT

## 2018-01-15 PROCEDURE — 85025 COMPLETE CBC W/AUTO DIFF WBC: CPT | Performed by: EMERGENCY MEDICINE

## 2018-01-15 PROCEDURE — 83880 ASSAY OF NATRIURETIC PEPTIDE: CPT | Performed by: EMERGENCY MEDICINE

## 2018-01-15 PROCEDURE — 51702 INSERT TEMP BLADDER CATH: CPT

## 2018-01-15 PROCEDURE — 65270000029 HC RM PRIVATE

## 2018-01-15 PROCEDURE — 96368 THER/DIAG CONCURRENT INF: CPT

## 2018-01-15 PROCEDURE — 74176 CT ABD & PELVIS W/O CONTRAST: CPT

## 2018-01-15 PROCEDURE — 87086 URINE CULTURE/COLONY COUNT: CPT | Performed by: EMERGENCY MEDICINE

## 2018-01-15 PROCEDURE — 94762 N-INVAS EAR/PLS OXIMTRY CONT: CPT

## 2018-01-15 PROCEDURE — 82550 ASSAY OF CK (CPK): CPT | Performed by: EMERGENCY MEDICINE

## 2018-01-15 PROCEDURE — 74011000258 HC RX REV CODE- 258: Performed by: EMERGENCY MEDICINE

## 2018-01-15 RX ORDER — LEVOFLOXACIN 5 MG/ML
750 INJECTION, SOLUTION INTRAVENOUS
Status: CANCELLED | OUTPATIENT
Start: 2018-01-17

## 2018-01-15 RX ORDER — DIPHENHYDRAMINE HYDROCHLORIDE 50 MG/ML
50 INJECTION, SOLUTION INTRAMUSCULAR; INTRAVENOUS ONCE
Status: COMPLETED | OUTPATIENT
Start: 2018-01-15 | End: 2018-01-15

## 2018-01-15 RX ORDER — MORPHINE SULFATE 2 MG/ML
4 INJECTION, SOLUTION INTRAMUSCULAR; INTRAVENOUS ONCE
Status: COMPLETED | OUTPATIENT
Start: 2018-01-15 | End: 2018-01-15

## 2018-01-15 RX ORDER — MORPHINE SULFATE 4 MG/ML
4 INJECTION INTRAVENOUS ONCE
Status: COMPLETED | OUTPATIENT
Start: 2018-01-15 | End: 2018-01-15

## 2018-01-15 RX ORDER — VANCOMYCIN HCL IN 5 % DEXTROSE 1.25 G/25
1250 PLASTIC BAG, INJECTION (ML) INTRAVENOUS ONCE
Status: COMPLETED | OUTPATIENT
Start: 2018-01-15 | End: 2018-01-15

## 2018-01-15 RX ORDER — LEVOFLOXACIN 5 MG/ML
750 INJECTION, SOLUTION INTRAVENOUS EVERY 24 HOURS
Status: DISCONTINUED | OUTPATIENT
Start: 2018-01-15 | End: 2018-01-16

## 2018-01-15 RX ORDER — SODIUM CHLORIDE 0.9 % (FLUSH) 0.9 %
5-10 SYRINGE (ML) INJECTION AS NEEDED
Status: DISCONTINUED | OUTPATIENT
Start: 2018-01-15 | End: 2018-01-26 | Stop reason: HOSPADM

## 2018-01-15 RX ORDER — ACETAMINOPHEN 325 MG/1
650 TABLET ORAL
Status: COMPLETED | OUTPATIENT
Start: 2018-01-15 | End: 2018-01-15

## 2018-01-15 RX ADMIN — VANCOMYCIN HYDROCHLORIDE 1250 MG: 10 INJECTION, POWDER, LYOPHILIZED, FOR SOLUTION INTRAVENOUS at 18:30

## 2018-01-15 RX ADMIN — SODIUM CHLORIDE 1000 ML: 900 INJECTION, SOLUTION INTRAVENOUS at 20:47

## 2018-01-15 RX ADMIN — MORPHINE SULFATE 4 MG: 2 INJECTION, SOLUTION INTRAMUSCULAR; INTRAVENOUS at 21:35

## 2018-01-15 RX ADMIN — LEVOFLOXACIN 750 MG: 5 INJECTION, SOLUTION INTRAVENOUS at 17:58

## 2018-01-15 RX ADMIN — DIPHENHYDRAMINE HYDROCHLORIDE 50 MG: 50 INJECTION, SOLUTION INTRAMUSCULAR; INTRAVENOUS at 19:03

## 2018-01-15 RX ADMIN — SODIUM CHLORIDE 2040 ML: 900 INJECTION, SOLUTION INTRAVENOUS at 18:53

## 2018-01-15 RX ADMIN — PROMETHAZINE HYDROCHLORIDE 12.5 MG: 25 INJECTION, SOLUTION INTRAMUSCULAR; INTRAVENOUS at 23:11

## 2018-01-15 RX ADMIN — ACETAMINOPHEN 650 MG: 325 TABLET ORAL at 23:46

## 2018-01-15 RX ADMIN — MORPHINE SULFATE 4 MG: 4 INJECTION INTRAVENOUS at 22:38

## 2018-01-15 NOTE — ED TRIAGE NOTES
Patient states pain in abdomen, vaginal area, hand pain and toe pain, starting approx 3 days ago. Denies any nausea and vomiting. Sepsis Screening completed    ( x )Patient meets SIRS criteria. ( )Patient does not meet SIRS criteria.       SIRS Criteria is achieved when two or more of the following are present   Temperature < 96.8°F (36°C) or > 100.9°F (38.3°C)   Heart Rate > 90 beats per minute   Respiratory Rate > 20 breaths per minute   WBC count > 12,000 or <4,000 or > 10% bands

## 2018-01-15 NOTE — ED PROVIDER NOTES
EMERGENCY DEPARTMENT HISTORY AND PHYSICAL EXAM    Date: 1/15/2018  Patient Name: Nayeli Gonzales    History of Presenting Illness     Chief Complaint   Patient presents with    Abdominal Pain    Hand Pain    Toe Pain    Groin Pain       History Provided By: Patient    Chief Complaint: groin pain  Duration: 3 Days  Location: perineum  Severity: 10 out of 10  Associated Symptoms: hand pain and swelling, abdominal pain    Additional History (Context):   5:46 PM  Nayeli Gonzales is a 81 y.o. female who presents with to the emergency department C/O groin pain x 3 days. Associated sxs include hand pain and swelling, abdominal pain. Hx of tumor removal surgery in a similar area with Dr. Chalino Grant about 5 years ago. Pt denies vomiting, fever, cough, SOB, and any other sxs or complaints. PCP: Seamus Razo MD    Current Facility-Administered Medications   Medication Dose Route Frequency Provider Last Rate Last Dose    sodium chloride (NS) flush 5-10 mL  5-10 mL IntraVENous PRN Erika Moore MD        levoFLOXacin [facility]) 750 mg in D5W IVPB  750 mg IntraVENous Q24H Erika Moore MD   Stopped at 01/15/18 2110    Vancomycin - Pharmacy to dose  1 Each Other Rx Dosing/Monitoring Erika Moore MD        morphine 4 mg  4 mg IntraVENous ONCE Erika Moore MD         Current Outpatient Prescriptions   Medication Sig Dispense Refill    sucralfate (CARAFATE) 1 gram tablet Take 1 Tab by mouth four (4) times daily. 60 Tab 2    nortriptyline (PAMELOR) 25 mg capsule Take 25 mg by mouth nightly.  lovastatin (MEVACOR) 40 mg tablet Take 40 mg by mouth nightly.  cyclobenzaprine (FLEXERIL) 10 mg tablet Take 10 mg by mouth three (3) times daily as needed for Muscle Spasm(s).  aspirin (ASPIRIN) 325 mg tablet Take 325 mg by mouth daily.  losartan (COZAAR) 100 mg tablet Take 100 mg by mouth daily.  famotidine (PEPCID) 20 mg tablet Take 20 mg by mouth two (2) times a day.         ezetimibe (ZETIA) 10 mg tablet Take 10 mg by mouth nightly. Past History     Past Medical History:  Past Medical History:   Diagnosis Date    Arthritis     Blood clot in vein 2004    left leg     [facility] ([facility] [address]     Cancer ([facility] [address]     right breast    GERD (gastroesophageal reflux disease)     High cholesterol     Hypertension     Other ill-defined conditions(799.89)     high cholestrol    Vertigo        Past Surgical History:  Past Surgical History:   Procedure Laterality Date    ABDOMEN SURGERY PROC UNLISTED      removal of tumor abdomen    HX BREAST LUMPECTOMY      HX GI      colonosocpy    HX KNEE REPLACEMENT      HX ORTHOPAEDIC      total knee replacement bilateral    HX ORTHOPAEDIC      excision of cyst left hand    HX TUBAL LIGATION      HX TUMOR REMOVAL         Family History:  History reviewed. No pertinent family history. Social History:  Social History   Substance Use Topics    Smoking status: Passive Smoke Exposure - Never Smoker    Smokeless tobacco: Never Used    Alcohol use No       Allergies: Allergies   Allergen Reactions    Amiloride Nausea and Vomiting    Amoxicillin Rash    Bactrim [Sulfamethoprim Ds] Unknown (comments)    Gabapentin Rash    Ibuprofen Unknown (comments)    Zestoretic [Lisinopril-Hydrochlorothiazide] Unknown (comments)         Review of Systems   Review of Systems   Constitutional: Negative for fever. HENT: Positive for congestion. Respiratory: Negative for cough and shortness of breath. Gastrointestinal: Positive for abdominal pain. Negative for vomiting. Genitourinary: Positive for vaginal pain (groin pain). Musculoskeletal: Positive for myalgias (hand pain, hand swelling). All other systems reviewed and are negative.       Physical Exam     Vitals:    01/15/18 2015 01/15/18 2130 01/15/18 2145 01/15/18 2215   BP: 121/82 121/64 134/54 130/77   Pulse:  (!) 132 (!) 128 (!) 124   Resp: 21 24 22 (!) 32   Temp:       SpO2:       Weight: Height:         Physical Exam   Constitutional: She is oriented to person, place, and time. She appears well-developed and well-nourished. HENT:   Head: Normocephalic and atraumatic. Right Ear: External ear normal.   Left Ear: External ear normal.   Nose: Nose normal.   Mouth/Throat: Oropharynx is clear and moist. Mucous membranes are dry. Eyes: Conjunctivae and EOM are normal. Pupils are equal, round, and reactive to light. Neck: Normal range of motion. Neck supple. No JVD present. No tracheal deviation present. Cardiovascular: Regular rhythm, normal heart sounds and intact distal pulses. Exam reveals no gallop and no friction rub. No murmur heard. Fast rate and rhythm   Pulmonary/Chest: Effort normal and breath sounds normal. No stridor. No respiratory distress. She has no wheezes. She has no rales. Abdominal: Soft. Bowel sounds are normal. She exhibits no distension and no mass. There is generalized tenderness (diffuse). There is no rebound and no guarding. Musculoskeletal: Normal range of motion. She exhibits no edema or tenderness. Neurological: She is alert and oriented to person, place, and time. She has normal reflexes. No cranial nerve deficit. Skin: Skin is warm and dry. Rash noted. There is erythema. Swelling, erythema and tenderness involving lower abdominal wall, mons and perineal area, not involving rectum or buttock with weaping. Patient has erythematous rash on anterior neck. Psychiatric: She has a normal mood and affect. Her behavior is normal.   Nursing note and vitals reviewed.         Diagnostic Study Results     Labs -     Recent Results (from the past 12 hour(s))   EKG, 12 LEAD, INITIAL    Collection Time: 01/15/18  6:28 PM   Result Value Ref Range    Ventricular Rate 142 BPM    Atrial Rate 142 BPM    P-R Interval 140 ms    QRS Duration 70 ms    Q-T Interval 254 ms    QTC Calculation (Bezet) 390 ms    Calculated P Axis 46 degrees    Calculated R Axis 17 degrees Calculated T Axis 42 degrees    Diagnosis       Sinus tachycardia  Otherwise normal ECG  When compared with ECG of 14-MAR-2016 11:05,  QRS axis shifted right     URINALYSIS W/ RFLX MICROSCOPIC    Collection Time: 01/15/18  7:50 PM   Result Value Ref Range    Color DARK YELLOW      Appearance CLOUDY      Specific gravity 1.026 1.005 - 1.030      pH (UA) 5.0 5.0 - 8.0      Protein TRACE (A) NEG mg/dL    Glucose NEGATIVE  NEG mg/dL    Ketone TRACE (A) NEG mg/dL    Bilirubin MODERATE (A) NEG      Blood TRACE (A) NEG      Urobilinogen 1.0 0.2 - 1.0 EU/dL    Nitrites POSITIVE (A) NEG      Leukocyte Esterase SMALL (A) NEG     URINE MICROSCOPIC ONLY    Collection Time: 01/15/18  7:50 PM   Result Value Ref Range    WBC 0 to 3 0 - 5 /hpf    RBC 0 to 1 0 - 5 /hpf    Epithelial cells FEW 0 - 5 /lpf    Bacteria 1+ (A) NEG /hpf    Mucus FEW (A) NEG /lpf    Hyaline cast 6 to 10 0 - 2 /lpf   LACTIC ACID    Collection Time: 01/15/18  8:40 PM   Result Value Ref Range    Lactic acid 2.8 (HH) 0.4 - 2.0 MMOL/L   PROTHROMBIN TIME + INR    Collection Time: 01/15/18  8:40 PM   Result Value Ref Range    Prothrombin time 17.2 (H) 11.5 - 15.2 sec    INR 1.5 (H) 0.8 - 1.2     CBC WITH AUTOMATED DIFF    Collection Time: 01/15/18  8:40 PM   Result Value Ref Range    WBC 14.3 (H) 4.6 - 13.2 K/uL    RBC 4.99 4.20 - 5.30 M/uL    HGB 15.2 12.0 - 16.0 g/dL    HCT 44.9 35.0 - 45.0 %    MCV 90.0 74.0 - 97.0 FL    MCH 30.5 24.0 - 34.0 PG    MCHC 33.9 31.0 - 37.0 g/dL    RDW 14.8 (H) 11.6 - 14.5 %    PLATELET 908 956 - 628 K/uL    MPV 10.2 9.2 - 11.8 FL    NEUTROPHILS 57 42 - 75 %    BAND NEUTROPHILS 10 (H) 0 - 5 %    LYMPHOCYTES 17 (L) 20 - 51 %    MONOCYTES 7 2 - 9 %    EOSINOPHILS 9 (H) 0 - 5 %    BASOPHILS 0 0 - 3 %    ABS. NEUTROPHILS 8.2 (H) 1.8 - 8.0 K/UL    ABS. LYMPHOCYTES 2.4 0.8 - 3.5 K/UL    ABS. MONOCYTES 1.0 0 - 1.0 K/UL    ABS. EOSINOPHILS 1.3 (H) 0.0 - 0.4 K/UL    ABS.  BASOPHILS 0.0 0.0 - 0.1 K/UL    PLATELET COMMENTS LARGE PLATELETS RBC COMMENTS POLYCHROMASIA  SLIGHT        WBC COMMENTS REACTIVE LYMPHS      DF MANUAL     CARDIAC PANEL,(CK, CKMB & TROPONIN)    Collection Time: 01/15/18  8:40 PM   Result Value Ref Range     (H) 26 - 192 U/L    CK - MB 5.4 (H) <3.6 ng/ml    CK-MB Index 0.7 0.0 - 4.0 %    Troponin-I, Qt. 0.04 0.00 - 5.80 NG/ML   METABOLIC PANEL, COMPREHENSIVE    Collection Time: 01/15/18  8:40 PM   Result Value Ref Range    Sodium 136 136 - 145 mmol/L    Potassium 4.6 3.5 - 5.5 mmol/L    Chloride 102 100 - 108 mmol/L    CO2 25 21 - 32 mmol/L    Anion gap 9 3.0 - 18 mmol/L    Glucose 126 (H) 74 - 99 mg/dL    BUN 45 (H) 7.0 - 18 MG/DL    Creatinine 2.62 (H) 0.6 - 1.3 MG/DL    BUN/Creatinine ratio 17 12 - 20      GFR est AA 22 (L) >60 ml/min/1.73m2    GFR est non-AA 18 (L) >60 ml/min/1.73m2    Calcium 8.0 (L) 8.5 - 10.1 MG/DL    Bilirubin, total 0.7 0.2 - 1.0 MG/DL    ALT (SGPT) 55 13 - 56 U/L    AST (SGOT) 43 (H) 15 - 37 U/L    Alk.  phosphatase 65 45 - 117 U/L    Protein, total 5.6 (L) 6.4 - 8.2 g/dL    Albumin 2.0 (L) 3.4 - 5.0 g/dL    Globulin 3.6 2.0 - 4.0 g/dL    A-G Ratio 0.6 (L) 0.8 - 1.7     NT-PRO BNP    Collection Time: 01/15/18  8:40 PM   Result Value Ref Range    NT pro- 0 - 900 PG/ML   POC CHEM8    Collection Time: 01/15/18  8:44 PM   Result Value Ref Range    CO2, POC 24 19 - 24 MMOL/L    Glucose,  (H) 74 - 106 MG/DL    BUN, POC 42 (H) 7 - 18 MG/DL    Creatinine, POC 2.6 (H) 0.6 - 1.3 MG/DL    GFRAA, POC 22 (L) >60 ml/min/1.73m2    GFRNA, POC 18 (L) >60 ml/min/1.73m2    Sodium,  136 - 145 MMOL/L    Potassium, POC 4.6 3.5 - 5.5 MMOL/L    Calcium, ionized (POC) 1.10 (L) 1.12 - 1.32 MMOL/L    Chloride,  100 - 108 MMOL/L    Anion gap, POC 17 10 - 20      Hematocrit, POC 47 36 - 49 %    Hemoglobin, POC 16.0 12 - 16 G/DL       Radiologic Studies -   CT ABD PELV WO CONT   Final Result      XR CHEST PORT    (Results Pending)     5:58 PM  RADIOLOGY FINDINGS  Chest X-ray shows no acute process  Pending review by Radiologist  Recorded by Negrito Feng, ED Scribe, as dictated by David Wright MD      CT Results  (Last 48 hours)               01/15/18 2120  CT ABD PELV WO CONT Final result    Impression:  IMPRESSION:       1. Improved at imaging findings related to proctitis           2. No acute findings within the abdomen or pelvis since the previous study           Narrative:  EXAM: CT of the abdomen and pelvis       INDICATION: Abdominal pain       COMPARISON: January 1, 2017       TECHNIQUE: Axial CT imaging of the abdomen and pelvis was performed without   intravenous contrast. Multiplanar reformats were generated. Dose reduction techniques used: Automated exposure control, adjustment of the   mAs and/or kVp according to patient's size, and iterative reconstruction   techniques. The specific techniques utilized on this CT exam have been   documented in the patient's electronic medical record.           _______________       FINDINGS:       LOWER CHEST: There is dependent atelectasis in the lung bases. LIVER, BILIARY: Within the left hepatic lobe, there is a stable hypodensity too   small to adequately characterize, likely benign. No biliary dilation. Gallbladder is unremarkable. PANCREAS: Normal.       SPLEEN: Normal.       ADRENALS: Normal.       KIDNEYS/URETERS/BLADDER: Normal.       PELVIC ORGANS: The uterus contains multiple calcifications. VASCULATURE: Unremarkable       LYMPH NODES: No enlarged lymph nodes. GASTROINTESTINAL TRACT: A large hiatal hernia is present. No bowel dilation or   wall thickening. Improved appearance of rectal wall thickening and inflammatory   changes. BONES: No acute or aggressive osseous abnormalities identified.  Severe left hip   degenerative changes       OTHER: None.       _______________               CXR Results  (Last 48 hours)    None            Medical Decision Making   I am the first provider for this patient. I reviewed the vital signs, available nursing notes, past medical history, past surgical history, family history and social history. Vital Signs-Reviewed the patient's vital signs. Pulse Oximetry Analysis - 100% on RA     EKG interpretation: (Preliminary)  Sinus Tachycardia; 142 bpm; S Rate changes  EKG read by Chari Rascon MD at 6:49 PM     Records Reviewed: Nursing Notes    Provider Notes (Medical Decision Making):    Ddx: Sepsis, cellulitis, lorenzas gangrene, UTI, diverticulitis, obstruction, kidney stone, colitis, appendicitis    Procedures:  Procedures   Procedure Note- Peripheral IV Access  8:37 PM  Performed by: QUINTIN Andrea  I gained IV access using  20 gauge needle because the patient had no vascular access. After cleaning the site with chloro prep, the right brachial vein was localized with ultrasound guidance in an anterior approach. Line confirmation was obtained by direct visualization and good blood return. No anaesthetic was used. The line was successfully flushed with normal saline and was secured with transparent tape. The procedure took 1-15 minutes, and pt tolerated well. Patient blood was drawn. Written by Cassie Garcia, ED       ED Course:   5:46 PM Initial assessment performed. The patients presenting problems have been discussed, and they are in agreement with the care plan formulated and outlined with them. I have encouraged them to ask questions as they arise throughout their visit. CONSULT NOTE:   10:35 PM  Chari Rascon MD spoke with Hector Cruz MD   Specialty: Hospitalist  Discussed pt's hx, disposition, and available diagnostic and imaging results  over the telephone. Reviewed care plans. Consulting physician agrees with plans as outlined. Agrees to admit to telemetry. Diagnosis and Disposition     Discussion:  Pt presents with severe abdominal pain, lower groin pain. Pt markedly tachycardic, met SIRS criteria.  Patient treated per sepsis protocol with 30 cc/kg bolus, broad spectrum abx. Exam consistent with perineal cellulitis, likely secondary to yeast infection. Serum lactic acid was elevated. Labs remarkable for leukocytosis, elevated cpk, elevated  BUN creatinine. UA showed UTI. CXR unremarkable. CT abdomen/pelvis showed no soft tissue gas or deep soft tissue infection. Case discussed with the hospitalist who agrees with admission. Critical Care Time:   10:54 PM  I have spent 30 minutes of critical care time involved in lab review, consultations with specialist, family decision-making, and documentation. During this entire length of time I was immediately available to the patient. Critical Care: The reason for providing this level of medical care for this critically ill patient was due a critical illness that impaired one or more vital organ systems such that there was a high probability of imminent or life threatening deterioration in the patients condition. This care involved high complexity decision making to assess, manipulate, and support vital system functions, to treat this degreee vital organ system failure and to prevent further life threatening deterioration of the patients condition. Core Measures:  For Hospitalized Patients:    1. Hospitalization Decision Time:  The decision to hospitalize the patient was made by Radha Weaver MD at 10:39 PM on 1/15/2018    2. Aspirin: Aspirin was not given because the patient did not present with a stroke at the time of their Emergency Department evaluation    10:39 PM  Patient is being admitted to the hospital by Griselda Gambler, MD. The results of their tests and reasons for their admission have been discussed with them and/or available family. They convey agreement and understanding for the need to be admitted and for their admission diagnosis. CONDITIONS ON ADMISSION:  Sepsis is present at the time of admission. Deep Vein Thrombosis is not present at the time of admission.  Thrombosis is not present at the time of admission. Urinary Tract Infection is present at the time of admission. Pneumonia is not present at the time of admission. MRSA is not present at the time of admission. Wound infection is not present at the time of admission. Pressure Ulcer is not present at the time of admission. CLINICAL IMPRESSION:    1. Sepsis, due to unspecified organism ([address]    2. Cellulitis of perineum    3. Cellulitis of abdominal wall    4. Cellulitis of groin        _______________________________    Attestations: This note is prepared by Uriel Caal. Ping, acting as Scribe for Mena Bojorquez MD.    Mena Bojorquez MD:  The scribe's documentation has been prepared under my direction and personally reviewed by me in its entirety.   I confirm that the note above accurately reflects all work, treatment, procedures, and medical decision making performed by me.  _______________________________

## 2018-01-15 NOTE — Clinical Note
Status[de-identified] Inpatient [101] Type of Bed: Telemetry [19] Inpatient Hospitalization Certified Necessary for the Following Reasons: 3. Patient receiving treatment that can only be provided in an inpatient setting (further clarification in H&P documentation) Admitting Diagnosis: Sepsis ([address] [[id number]] Admitting Diagnosis: Cellulitis of abdominal wall [[id number]] Admitting Diagnosis: Cellulitis of groin [[id number]] Admitting Diagnosis: Renal insufficiency [[id number]] Admitting Diagnosis: UTI (urinary tract infection) [[id number]] Admitting Physician: Blossom Olvera [[id number]] Attending Physician: Blossom Olvera [[id number]] Estimated Length of Stay: 2 Midnights Discharge Plan[de-identified] Home with Office Follow-up

## 2018-01-15 NOTE — IP AVS SNAPSHOT
[address] [address] 
[phone] Patient: Isabel Cheney MRN: [id number] JLL:[id number] About your hospitalization You were admitted on:  January 15, 2018 You last received care in the:  [address] [place] [address] You were discharged on:  January 26, 2018 Why you were hospitalized Your primary diagnosis was:  Cellulitis Of Abdominal Wall Your diagnoses also included:  Cellulitis Of Groin, Uti (Urinary Tract Infection), Renal Insufficiency, Sepsis (Hcc), Htn (Hypertension), Gerd (Gastroesophageal Reflux Disease), Hld (Hyperlipidemia), Moderate Dehydration, Diastolic Chf, Chronic (Hcc), Sirs (Systemic Inflammatory Response Syndrome) (Hcc), Abdominal Wall Cellulitis, Proctitis, Acute Encephalopathy, Rash, Hypotension, Severe Sepsis (Hcc), Leukocytosis, Hypokalemia, Hypomagnesemia, Hypoalbuminemia Follow-up Information Follow up With Details Comments Contact Info [address] [facility] to continue managing your healthcare needs. [phone] Nancy Baez MD In 3 days  [address] [place] [address] [address] 17[facility] 
[phone] Billie Alejandre will call patient at home to schedule a follow-up appointment. CMS alerted Jose Jon that patient is CHF and needs an appointment 2-3 days. 8[facility],[address] 525 [facility] 
[phone] Discharge Orders None A check janee indicates which time of day the medication should be taken. My Medications START taking these medications Instructions Each Dose to Equal  
 Morning Noon Evening Bedtime  
 levoFLOXacin 500 mg tablet Commonly known as:  Davkarrie Landong Your last dose was: Your next dose is: Take 1 Tab by mouth daily for 5 days. 500 mg  
    
   
   
   
  
 nystatin powder Commonly known as:  MYCOSTATIN Your last dose was: Your next dose is: Apply  to affected area two (2) times a day. CONTINUE taking these medications Instructions Each Dose to Equal  
 Morning Noon Evening Bedtime  
 allopurinol 300 mg tablet Commonly known as:  Charmayne Harts Your last dose was: Your next dose is: Take 300 mg by mouth daily. 300 mg  
    
   
   
   
  
 aspirin 325 mg tablet Commonly known as:  ASPIRIN Your last dose was: Your next dose is: Take 325 mg by mouth daily. 325 mg  
    
   
   
   
  
 famotidine 20 mg tablet Commonly known as:  PEPCID Your last dose was: Your next dose is: Take 20 mg by mouth two (2) times a day. 20 mg FLEXERIL 10 mg tablet Generic drug:  cyclobenzaprine Your last dose was: Your next dose is: Take 10 mg by mouth three (3) times daily as needed for Muscle Spasm(s). 10 mg  
    
   
   
   
  
 hydroCHLOROthiazide 25 mg tablet Commonly known as:  HYDRODIURIL Your last dose was: Your next dose is: Take 25 mg by mouth daily. 25 mg  
    
   
   
   
  
 MEVACOR 40 mg tablet Generic drug:  lovastatin Your last dose was: Your next dose is: Take 40 mg by mouth nightly. 40 mg PAMELOR 25 mg capsule Generic drug:  nortriptyline Your last dose was: Your next dose is: Take 25 mg by mouth nightly. 25 mg  
    
   
   
   
  
 spironolactone 25 mg tablet Commonly known as:  ALDACTONE Your last dose was: Your next dose is: Take  by mouth daily. sucralfate 1 gram tablet Commonly known as:  Valma Daniel Your last dose was: Your next dose is: Take 1 Tab by mouth four (4) times daily. 1 g  
    
   
   
   
  
 ZETIA 10 mg tablet Generic drug:  ezetimibe Your last dose was: Your next dose is: Take 10 mg by mouth nightly. 10 mg  
    
   
   
   
  
  
STOP taking these medications   
 losartan 100 mg tablet Commonly known as:  COZAAR Where to Get Your Medications These medications were sent to [address] [place] [address] - [address]  [address] Phone:  [phone]  
  levoFLOXacin 500 mg tablet  
 nystatin powder Discharge Instructions DISCHARGE SUMMARY from Nurse PATIENT INSTRUCTIONS: 
 
 
F-face looks uneven A-arms unable to move or move unevenly S-speech slurred or non-existent T-time-call 911 as soon as signs and symptoms begin-DO NOT go Back to bed or wait to see if you get better-TIME IS BRAIN. Warning Signs of HEART ATTACK Call 911 if you have these symptoms: 
? Chest discomfort. Most heart attacks involve discomfort in the center of the chest that lasts more than a few minutes, or that goes away and comes back. It can feel like uncomfortable pressure, squeezing, fullness, or pain. ? Discomfort in other areas of the upper body. Symptoms can include pain or discomfort in one or both arms, the back, neck, jaw, or stomach. ? Shortness of breath with or without chest discomfort. ? Other signs may include breaking out in a cold sweat, nausea, or lightheadedness. Don't wait more than five minutes to call [address]! Fast action can save your life. Calling 911 is almost always the fastest way to get lifesaving treatment. Emergency Medical Services staff can begin treatment when they arrive  up to an hour sooner than if someone gets to the hospital by car. The discharge information has been reviewed with the patient.   The patient verbalized understanding. Discharge medications reviewed with the patient and appropriate educational materials and side effects teaching were provided. ___________________________________________________________________________________________________________________________________ Patient armband removed and shredded MyChart Announcement We are excited to announce that we are making your provider's discharge notes available to you in Phagenesis. You will see these notes when they are completed and signed by the physician that discharged you from your recent hospital stay. If you have any questions or concerns about any information you see in Phagenesis, please call the Health Information Department where you were seen or reach out to your Primary Care Provider for more information about your plan of care. Introducing [facility]! Azalea Grossman introduces Phagenesis patient portal. Now you can access parts of your medical record, email your doctor's office, and request medication refills online. 1. In your internet browser, go to [email or web address] 2. Click on the First Time User? Click Here link in the Sign In box. You will see the New Member Sign Up page. 3. Enter your Phagenesis Access Code exactly as it appears below. You will not need to use this code after youve completed the sign-up process. If you do not sign up before the expiration date, you must request a new code. · Phagenesis Access Code: [email or web address] Expires: 4/26/2018 10:20 AM 
 
4. Enter the last four digits of your Social Security Number (xxxx) and Date of Birth (mm/dd/yyyy) as indicated and click Submit. You will be taken to the next sign-up page. 5. Create a batteriit ID. This will be your Phagenesis login ID and cannot be changed, so think of one that is secure and easy to remember. 6. Create a Phagenesis password. You can change your password at any time. 7. Enter your Password Reset Question and Answer. This can be used at a later time if you forget your password. 8. Enter your e-mail address. You will receive e-mail notification when new information is available in [address] 9. Click Sign Up. You can now view and download portions of your medical record. 10. Click the Download Summary menu link to download a portable copy of your medical information. If you have questions, please visit the Frequently Asked Questions section of the Pap[employer]ikat website. Remember, [employer]at is NOT to be used for urgent needs. For medical emergencies, dial 911. Now available from your iPhone and Android! Providers Seen During Your Hospitalization Provider Specialty Primary office phone Afia Juan MD Emergency Medicine [phone] Isabelle Curry MD Internal Medicine [phone] Your Primary Care Physician (PCP) Primary Care Physician Office Phone Office Fax Nasra Rubalcava [phone] You are allergic to the following Allergen Reactions Amiloride Nausea and Vomiting Amoxicillin Rash Bactrim (Sulfamethoprim Ds) Unknown (comments) Gabapentin Rash Ibuprofen Unknown (comments) Zestoretic (Lisinopril-Hydrochlorothiazide) Unknown (comments) Recent Documentation Height Weight BMI OB Status Smoking Status 1.499 m 74.8 kg 33.31 kg/m2 Postmenopausal Passive Smoke Exposure - Never Smoker Emergency Contacts Name Discharge Info Relation Home Work Mobile [address] [place] [address] CAREGIVER [3] Spouse [3] [phone] Patient Belongings The following personal items are in your possession at time of discharge: 
  Dental Appliances: None  Visual Aid: None      Home Medications: None   Jewelry: None  Clothing: None    Other Valuables: None Discharge Instructions Attachments/References CHRONIC HEALTH CONDITIONS: CONSERVING ENERGY (ENGLISH) HEART FAILURE ZONES: GENERAL INFO (ENGLISH) PROCTITIS (ENGLISH) Patient Handouts Learning About Saving Energy When You Have a Chronic Condition Introduction Everyday tasks can be tiring when you have COPD, heart failure, or another long-term (chronic) condition. You may feel at times that you've lost your ability to live your life. But learning to conserve, or save, your energy can help you be less tired. Conserving your energy means finding ways of doing daily activities with as little effort as possible. With some small changes in the way you do things, you can get your tasks done more easily. Some treatments are available that might help. Pulmonary rehabilitation can teach you ways to breathe easier. Cardiac rehabilitation can help make your heart stronger. You also may want to see an occupational or physical therapist. The therapist can give you more tips on building strength and moving with less effort. What can you do to conserve your energy? Planning · Make a list of what you have to do every day. Group the tasks by location. · Do all the chores in one part of your house around the same time. · Go out for errands or do chores at the time of day when you have the most energy. · Plan rest periods into your day. Getting things done · Sit down as often as you can when you get dressed, do chores, or cook. · Use a cart with wheels to roll items, such as laundry, from one room to another. · Push or slide boxes or other large items instead of lifting them. Reaching and bending · Put things you use the most on shelves that are at the level of your waist or shoulder. · Use long-handled grabbers or other tools to reach items on a high shelf or to  things off the floor. Use long-handled dusters when you clean the house. · Use a raised toilet seat to avoid bending too far to sit or stand up. Eating · Eat several small meals instead of three larger meals. · If you get too tired to eat much, try to choose healthy foods that have more calories. Have a yogurt-and-fruit smoothie for breakfast. Put avocado on a sandwich. Or add cheese or peanut butter to snacks. · If you don't feel very hungry, try to eat first and drink water or other fluids later, after a meal. This can help keep you from losing weight. Sip small amounts of fluids if you need to drink while you eat. Having sex · Choose the time of day when you have more energy. · A [id number] position for sex can be less tiring. Sometimes you may want to focus more on caressing. Watch closely for changes in your health, and be sure to contact your doctor if you have any problems. Where can you learn more? Go to http://cathleenCanvas Networksaniyah.info/. Enter H190 in the search box to learn more about \"Learning About Saving Energy When You Have a Chronic Condition. \" Current as of: May 12, 2017 Content Version: 11.4 © 6955-6998 [employer]. Care instructions adapted under license by [employer] (which disclaims liability or warranty for this information). If you have questions about a medical condition or this instruction, always ask your healthcare professional. [address] any warranty or liability for your use of this information. Learning About Heart Failure Zones What are heart failure zones? Heart failure zones give you an easy way to see changes in your heart failure symptoms. They also tell you when you need to get help. Check every day to see which zone you are in. Green zone. You are doing well. This is where you want to be. · Your weight is stable. This means it is not going up or down. · You breathe easily. · You are sleeping well. You are able to lie flat without shortness of breath. · You can do your usual activities. Yellow zone. Be careful. Your symptoms are changing. Call your doctor. · You have new or increased shortness of breath. · You are dizzy or lightheaded, or you feel like you may faint. · You have sudden weight gain, such as more than 2 to 3 pounds in a day or 5 pounds in a week. (Your doctor may suggest a different range of weight gain.) · You have increased swelling in your legs, ankles, or feet. · You are so tired or weak that you cannot do your usual activities. · You are not sleeping well. Shortness of breath wakes you up at night. You need extra pillows. Your doctor's name: ____________________________________________________________ Your doctor's contact information: _________________________________________________ Red zone. This is an emergency. Call 911. You have symptoms of sudden heart failure, such as: 
· You have severe trouble breathing. · You cough up pink, foamy mucus. · You have a new irregular or fast heartbeat. You have symptoms of a heart attack. These may include: · Chest pain or pressure, or a strange feeling in the chest. 
· Sweating. · Shortness of breath. · Nausea or vomiting. · Pain, pressure, or a strange feeling in the back, neck, jaw, or upper belly or in one or both shoulders or arms. · Lightheadedness or sudden weakness. · A fast or irregular heartbeat. If you have symptoms of a heart attack: After you call 911, the  may tell you to chew 1 adult-strength or 2 to 4 low-dose aspirin. Wait for an ambulance. Do not try to drive yourself. Follow-up care is a key part of your treatment and safety. Be sure to make and go to all appointments, and call your doctor if you are having problems. It's also a good idea to know your test results and keep a list of the medicines you take. Where can you learn more? Go to http://cathleen-aniyah.info/. Enter T174 in the search box to learn more about \"Learning About Heart Failure Zones. \" 
 Current as of: September 21, 2016 Content Version: 11.4 © 1099-9427 [employer]. Care instructions adapted under license by [employer] (which disclaims liability or warranty for this information). If you have questions about a medical condition or this instruction, always ask your healthcare professional. [address] any warranty or liability for your use of this information. Proctitis: Care Instructions Your Care Instructions Proctitis is inflammation of the lining of the rectum. It can be a short-term or long-term problem. Many things can cause proctitis. It may be a side effect of medical treatments, such as radiation therapy or antibiotics. Some sexually transmitted diseases may also cause proctitis. It may be related to ulcerative colitis or to Crohn's disease. Other causes include bacterial infection, allergies, or injury or nerve problems in the rectum. Common symptoms include pain or itching in the rectum and a constant or frequent strong need to have a bowel movement. You may have a change in bowel habits; a fever; and mucus, blood, or pus in your stools. Follow-up care is a key part of your treatment and safety. Be sure to make and go to all appointments, and call your doctor if you are having problems. It's also a good idea to know your test results and keep a list of the medicines you take. How can you care for yourself at home? · Take your medicines exactly as prescribed. Call your doctor if you think you are having a problem with your medicine. You will get more details on the specific medicines your doctor prescribes. · If your doctor prescribed antibiotics, take them as directed. Do not stop taking them just because you feel better. You need to take the full course of antibiotics. · Some complementary treatments may help. These include acupuncture, herbal remedies, and diet supplements.  Be sure to talk to your doctor before you use any complementary treatment. · Avoid anal intercourse. This will prevent further damage to the anal canal and give it time to heal. 
· Avoid foods that seem to make your symptoms worse. Common problem foods include dairy products, foods and drinks that contain caffeine, and high-fat foods. These foods can irritate the digestive tract and make conditions like ulcerative colitis worse. · Take steps to reduce stress. Exercises such as yoga or araceli chi can help you deal with stress. Talk to your doctor about these and other methods of reducing stress. When should you call for help? Call your doctor now or seek immediate medical care if: 
? · You have new or worse pain. ? · You have new or worse bleeding from the rectum. ? Watch closely for changes in your health, and be sure to contact your doctor if: 
? · You cannot pass stools or gas. ? · You do not get better as expected. Where can you learn more? Go to http://cathleen-aniyah.info/. Enter E689 in the search box to learn more about \"Proctitis: Care Instructions. \" Current as of: May 12, 2017 Content Version: 11.4 © 2849-5296 [employer]. Care instructions adapted under license by [employer] (which disclaims liability or warranty for this information). If you have questions about a medical condition or this instruction, always ask your healthcare professional. [address] any warranty or liability for your use of this information. Please provide this summary of care documentation to your next provider. Signatures-by signing, you are acknowledging that this After Visit Summary has been reviewed with you and you have received a copy. Patient Signature:  ____________________________________________________________ Date:  ____________________________________________________________  
  
Kimberley Matthews  Provider Signature: ____________________________________________________________ Date:  ____________________________________________________________

## 2018-01-15 NOTE — IP AVS SNAPSHOT
Summary of Care Report The Summary of Care report has been created to help improve care coordination. Users with access to [employer] or [address] (Web-based application) may access additional patient information including the Discharge Summary. If you are not currently a [address] user and need more information, please call the number listed below in the [place] [address] section and ask to be connected with Medical Records. Facility Information Name Address Phone [address] [address] [phone] Patient Information Patient Name Sex  Jeny Lai ([id number]) Female 1945 Discharge Information Admitting Provider Service Area Unit Paige Wilson MD / [address] Card/Med / [phone] Discharge Provider Discharge Date/Time Discharge Disposition Destination (none) 2018 (Pending) AHR (none) Patient Language Language ENGLISH [13] Hospital Problems as of 2018  Reviewed: 1/15/2018 11:20 PM by Paige Wilson MD  
  
  
  
 Class Noted - Resolved Last Modified POA Active Problems HTN (hypertension)  2013 - Present 1/15/2018 by Paige Wilson MD Yes Entered by Melly Rosario MD  
  * (Principal)Cellulitis of abdominal wall  1/15/2018 - Present 1/15/2018 by Paige Wilson MD Unknown Entered by Noemy Turcios MD  
  Cellulitis of groin  1/15/2018 - Present 1/15/2018 by Noemy Turcios MD Unknown Entered by Noemy Turcios MD  
  UTI (urinary tract infection)  1/15/2018 - Present 1/15/2018 by Noemy Turcios MD Unknown Entered by Noemy Turcios MD  
  Renal insufficiency  1/15/2018 - Present 1/15/2018 by Noemy Turcios MD Unknown Entered by Noemy Turcios MD  
  Sepsis [facility])  1/15/2018 - Present 1/15/2018 by Noemy Turcios MD Unknown Entered by Luanne Duran MD  
  GERD (gastroesophageal reflux disease)  1/15/2018 - Present 1/15/2018 by Maki Shoemaker MD Unknown Entered by Maki Shoemaker MD  
  HLD (hyperlipidemia)  1/15/2018 - Present 1/15/2018 by Maki Shoemaker MD Unknown Entered by Maki Shoemaker MD  
  Moderate dehydration  1/15/2018 - Present 1/15/2018 by Maki Shoemaker MD Unknown Entered by Maki Shoemaker MD  
  Diastolic CHF, chronic ([facility]  1/15/2018 - Present 1/15/2018 by Maki Shoemaker MD Unknown Entered by Maki Shoemaker MD  
  SIRS (systemic inflammatory response syndrome) ([facility]  1/15/2018 - Present 1/15/2018 by Maki Shoemaker MD Unknown Entered by Maki Shoemaker MD  
  Abdominal wall cellulitis  1/15/2018 - Present 1/15/2018 by Maki Shoemaker MD Unknown Entered by Maki Shoemakre MD  
  Proctitis  1/17/2018 - Present 1/17/2018 by Kymberly Franklin MD Unknown Entered by Kymberly Franklin MD  
  Acute encephalopathy  1/18/2018 - Present 1/18/2018 by Kymberly Franklin MD Unknown Entered by Kymberly Franklin MD  
  Rash  1/18/2018 - Present 1/18/2018 by Kymberly Franklin MD Unknown Entered by Kymberly Franklin MD  
  Hypotension  1/18/2018 - Present 1/18/2018 by Kymberly Franklin MD Unknown Entered by Kymberly Franklin MD  
  Severe sepsis [facility])  1/18/2018 - Present 1/18/2018 by Kymberly Franklin MD Unknown Entered by Kymberly Franklin MD  
  Leukocytosis  1/21/2018 - Present 1/21/2018 by Kymberly Franklin MD Unknown Entered by Kymberly Franklin MD  
  Hypokalemia  1/22/2018 - Present 1/22/2018 by Kymberly Franklin MD Unknown Entered by Kymberly Franklin MD  
  Hypomagnesemia  1/22/2018 - Present 1/22/2018 by Kymberly Franklin MD Unknown Entered by Kymberly Franklin MD  
  Hypoalbuminemia  1/25/2018 - Present 1/25/2018 by Kymberly Franklin MD Unknown Entered by Kymberly Franklin MD  
  
Non-Hospital Problems as of 1/26/2018  Reviewed: 1/15/2018 11:20 PM by Maki Shoemaker MD  
  
  
  
 Class Noted - Resolved Last Modified Active Problems Other and unspecified noninfectious gastroenteritis and colitis(558.9)  11/4/2013 - Present 11/6/2013   Entered by Manuel Meyers MD  
 Rectal bleeding  11/4/2013 - Present 11/6/2013 Entered by Tello Saldana MD  
  Unspecified constipation  11/4/2013 - Present 11/6/2013 Entered by Tello Saldana MD  
  Constipation  1/17/2014 - Present 1/17/2014 by Dedra Mandujano Entered by Dedra Mandujano Ischemic colitis ([address]  1/17/2014 - Present 1/17/2014 by Dedra Mandujano Entered by Dedra Mandujano Diverticulosis of colon  1/17/2014 - Present 1/17/2014 by Dedra Mandujano Entered by Dedra Mandujano Internal hemorrhoids  1/17/2014 - Present 1/17/2014 by Dedra Mandujano Entered by Dedra Simple You are allergic to the following Allergen Reactions Amiloride Nausea and Vomiting Amoxicillin Rash Bactrim (Sulfamethoprim Ds) Unknown (comments) Gabapentin Rash Ibuprofen Unknown (comments) Zestoretic (Lisinopril-Hydrochlorothiazide) Unknown (comments) Current Discharge Medication List  
  
START taking these medications Dose & Instructions Dispensing Information Comments  
 levoFLOXacin 500 mg tablet Commonly known as:  Floy Silvius Dose:  500 mg Take 1 Tab by mouth daily for 5 days. Quantity:  5 Tab Refills:  0  
   
 nystatin powder Commonly known as:  MYCOSTATIN Apply  to affected area two (2) times a day. Quantity:  60 g Refills:  0 CONTINUE these medications which have NOT CHANGED Dose & Instructions Dispensing Information Comments  
 allopurinol 300 mg tablet Commonly known as:  Codey Card Dose:  300 mg Take 300 mg by mouth daily. Refills:  0  
   
 aspirin 325 mg tablet Commonly known as:  ASPIRIN Dose:  325 mg Take 325 mg by mouth daily. Refills:  0  
   
 famotidine 20 mg tablet Commonly known as:  PEPCID Dose:  20 mg Take 20 mg by mouth two (2) times a day. Refills:  0  
   
 FLEXERIL 10 mg tablet Generic drug:  cyclobenzaprine  Dose:  10 mg  
 Take 10 mg by mouth three (3) times daily as needed for Muscle Spasm(s). Refills:  0  
   
 hydroCHLOROthiazide 25 mg tablet Commonly known as:  HYDRODIURIL Dose:  25 mg Take 25 mg by mouth daily. Refills:  0 MEVACOR 40 mg tablet Generic drug:  lovastatin Dose:  40 mg Take 40 mg by mouth nightly. Refills:  0 PAMELOR 25 mg capsule Generic drug:  nortriptyline Dose:  25 mg Take 25 mg by mouth nightly. Refills:  0  
   
 spironolactone 25 mg tablet Commonly known as:  ALDACTONE Take  by mouth daily. Refills:  0  
   
 sucralfate 1 gram tablet Commonly known as:  Melda Pond Dose:  1 g Take 1 Tab by mouth four (4) times daily. Quantity:  60 Tab Refills:  2 ZETIA 10 mg tablet Generic drug:  ezetimibe Dose:  10 mg Take 10 mg by mouth nightly. Refills:  0 STOP taking these medications Comments  
 losartan 100 mg tablet Commonly known as:  COZAAR Current Immunizations Name Date Influenza Vaccine 8/31/2013 Pneumococcal Vaccine (Unspecified Type) 8/1/2013 Follow-up Information Follow up With Details Comments Contact Info [address] [facility] to continue managing your healthcare needs. [phone] Naa Blackburn MD In 3 days  [facility] [address] [place] [address] [address] [place] [address] 
[phone] Cedric Gibson will call patient at home to schedule a follow-up appointment. CMS alerted [facility] that patient is CHF and needs an appointment 2-3 days. [address] [facility],[address] [place] [address] 
[phone] Discharge Instructions DISCHARGE SUMMARY from Nurse PATIENT INSTRUCTIONS: 
 
 
F-face looks uneven A-arms unable to move or move unevenly S-speech slurred or non-existent T-time-call 911 as soon as signs and symptoms begin-DO NOT go  
 Back to bed or wait to see if you get better-TIME IS BRAIN. Warning Signs of HEART ATTACK Call 911 if you have these symptoms: 
? Chest discomfort. Most heart attacks involve discomfort in the center of the chest that lasts more than a few minutes, or that goes away and comes back. It can feel like uncomfortable pressure, squeezing, fullness, or pain. ? Discomfort in other areas of the upper body. Symptoms can include pain or discomfort in one or both arms, the back, neck, jaw, or stomach. ? Shortness of breath with or without chest discomfort. ? Other signs may include breaking out in a cold sweat, nausea, or lightheadedness. Don't wait more than five minutes to call [address]! Fast action can save your life. Calling 911 is almost always the fastest way to get lifesaving treatment. Emergency Medical Services staff can begin treatment when they arrive  up to an hour sooner than if someone gets to the hospital by car. The discharge information has been reviewed with the patient. The patient verbalized understanding. Discharge medications reviewed with the patient and appropriate educational materials and side effects teaching were provided. ___________________________________________________________________________________________________________________________________ Patient armband removed and shredded Chart Review Routing History Recipient Method Report Sent By Tracy Woodson MD  
Phone: [phone] In [employer] Routed [employer] [[id number]] 11/6/2013  6:37 AM 11/06/2013 Natalie Navarro MD  
Fax: [phone] Phone: [phone] Fax IP Auto Routed [employer] [[id number]] 11/6/2013  6:37 AM 11/06/2013 Jaki Reeves MD  
Phone: [phone] [employer] Routed [employer] [[id number]] 11/6/2013  6:37 AM 11/06/2013 Naa Blackburn MD  
Fax: [phone] Phone: [phone] Fax IP Auto Routed [address] [[id number]] 11/6/2013  6:37 AM 11/06/2013 Norina Moritz, MD  
Fax: [phone] Phone: [phone] Fax Notes/Transcriptions Meghna Lion [[id number]] 10/8/2014  8:31 AM 10/06/2014

## 2018-01-16 LAB
ANION GAP SERPL CALC-SCNC: 12 MMOL/L (ref 3–18)
APTT PPP: 41.6 SEC (ref 23–36.4)
BUN SERPL-MCNC: 44 MG/DL (ref 7–18)
BUN/CREAT SERPL: 21 (ref 12–20)
CALCIUM SERPL-MCNC: 7.8 MG/DL (ref 8.5–10.1)
CHLORIDE SERPL-SCNC: 107 MMOL/L (ref 100–108)
CO2 SERPL-SCNC: 17 MMOL/L (ref 21–32)
CREAT SERPL-MCNC: 2.14 MG/DL (ref 0.6–1.3)
ERYTHROCYTE [DISTWIDTH] IN BLOOD BY AUTOMATED COUNT: 15 % (ref 11.6–14.5)
GLUCOSE SERPL-MCNC: 73 MG/DL (ref 74–99)
HCT VFR BLD AUTO: 46.5 % (ref 35–45)
HGB BLD-MCNC: 15.3 G/DL (ref 12–16)
INR PPP: 1.6 (ref 0.8–1.2)
LACTATE SERPL-SCNC: 2.7 MMOL/L (ref 0.4–2)
LACTATE SERPL-SCNC: 2.8 MMOL/L (ref 0.4–2)
LACTATE SERPL-SCNC: 2.9 MMOL/L (ref 0.4–2)
LACTATE SERPL-SCNC: 3.3 MMOL/L (ref 0.4–2)
MAGNESIUM SERPL-MCNC: 1.6 MG/DL (ref 1.6–2.6)
MCH RBC QN AUTO: 29.8 PG (ref 24–34)
MCHC RBC AUTO-ENTMCNC: 32.9 G/DL (ref 31–37)
MCV RBC AUTO: 90.5 FL (ref 74–97)
PHOSPHATE SERPL-MCNC: 2.5 MG/DL (ref 2.5–4.9)
PLATELET # BLD AUTO: 138 K/UL (ref 135–420)
PMV BLD AUTO: 9.9 FL (ref 9.2–11.8)
POTASSIUM SERPL-SCNC: 4.9 MMOL/L (ref 3.5–5.5)
PROTHROMBIN TIME: 18.7 SEC (ref 11.5–15.2)
RBC # BLD AUTO: 5.14 M/UL (ref 4.2–5.3)
SODIUM SERPL-SCNC: 136 MMOL/L (ref 136–145)
WBC # BLD AUTO: 12.9 K/UL (ref 4.6–13.2)

## 2018-01-16 PROCEDURE — 85027 COMPLETE CBC AUTOMATED: CPT | Performed by: INTERNAL MEDICINE

## 2018-01-16 PROCEDURE — 65660000000 HC RM CCU STEPDOWN

## 2018-01-16 PROCEDURE — 74011250636 HC RX REV CODE- 250/636: Performed by: INTERNAL MEDICINE

## 2018-01-16 PROCEDURE — 85610 PROTHROMBIN TIME: CPT | Performed by: INTERNAL MEDICINE

## 2018-01-16 PROCEDURE — 84100 ASSAY OF PHOSPHORUS: CPT | Performed by: INTERNAL MEDICINE

## 2018-01-16 PROCEDURE — 83605 ASSAY OF LACTIC ACID: CPT | Performed by: EMERGENCY MEDICINE

## 2018-01-16 PROCEDURE — 74011250637 HC RX REV CODE- 250/637: Performed by: HOSPITALIST

## 2018-01-16 PROCEDURE — 74011250636 HC RX REV CODE- 250/636: Performed by: HOSPITALIST

## 2018-01-16 PROCEDURE — 83735 ASSAY OF MAGNESIUM: CPT | Performed by: INTERNAL MEDICINE

## 2018-01-16 PROCEDURE — 83605 ASSAY OF LACTIC ACID: CPT | Performed by: HOSPITALIST

## 2018-01-16 PROCEDURE — 74011250637 HC RX REV CODE- 250/637: Performed by: INTERNAL MEDICINE

## 2018-01-16 PROCEDURE — 80048 BASIC METABOLIC PNL TOTAL CA: CPT | Performed by: INTERNAL MEDICINE

## 2018-01-16 PROCEDURE — 85730 THROMBOPLASTIN TIME PARTIAL: CPT | Performed by: INTERNAL MEDICINE

## 2018-01-16 PROCEDURE — 36415 COLL VENOUS BLD VENIPUNCTURE: CPT | Performed by: HOSPITALIST

## 2018-01-16 RX ORDER — NORTRIPTYLINE HYDROCHLORIDE 25 MG/1
25 CAPSULE ORAL
Status: DISCONTINUED | OUTPATIENT
Start: 2018-01-16 | End: 2018-01-26 | Stop reason: HOSPADM

## 2018-01-16 RX ORDER — ASPIRIN 325 MG
325 TABLET ORAL DAILY
Status: DISCONTINUED | OUTPATIENT
Start: 2018-01-16 | End: 2018-01-26 | Stop reason: HOSPADM

## 2018-01-16 RX ORDER — SODIUM CHLORIDE 9 MG/ML
125 INJECTION, SOLUTION INTRAVENOUS CONTINUOUS
Status: DISCONTINUED | OUTPATIENT
Start: 2018-01-16 | End: 2018-01-18

## 2018-01-16 RX ORDER — NYSTATIN 100000 [USP'U]/G
POWDER TOPICAL 2 TIMES DAILY
Status: DISCONTINUED | OUTPATIENT
Start: 2018-01-16 | End: 2018-01-26 | Stop reason: HOSPADM

## 2018-01-16 RX ORDER — DIPHENHYDRAMINE HCL 25 MG
25 CAPSULE ORAL
Status: DISCONTINUED | OUTPATIENT
Start: 2018-01-16 | End: 2018-01-26 | Stop reason: HOSPADM

## 2018-01-16 RX ORDER — LEVOFLOXACIN 5 MG/ML
500 INJECTION, SOLUTION INTRAVENOUS
Status: DISCONTINUED | OUTPATIENT
Start: 2018-01-17 | End: 2018-01-17

## 2018-01-16 RX ORDER — FLUCONAZOLE 100 MG/1
200 TABLET ORAL DAILY
Status: DISCONTINUED | OUTPATIENT
Start: 2018-01-16 | End: 2018-01-20

## 2018-01-16 RX ORDER — HEPARIN SODIUM 5000 [USP'U]/ML
5000 INJECTION, SOLUTION INTRAVENOUS; SUBCUTANEOUS EVERY 8 HOURS
Status: DISCONTINUED | OUTPATIENT
Start: 2018-01-16 | End: 2018-01-18

## 2018-01-16 RX ORDER — FAMOTIDINE 20 MG/1
20 TABLET, FILM COATED ORAL 2 TIMES DAILY
Status: DISCONTINUED | OUTPATIENT
Start: 2018-01-16 | End: 2018-01-18

## 2018-01-16 RX ORDER — ACETAMINOPHEN 325 MG/1
650 TABLET ORAL
Status: DISCONTINUED | OUTPATIENT
Start: 2018-01-16 | End: 2018-01-26 | Stop reason: HOSPADM

## 2018-01-16 RX ORDER — LEVOFLOXACIN 5 MG/ML
750 INJECTION, SOLUTION INTRAVENOUS
Status: DISCONTINUED | OUTPATIENT
Start: 2018-01-17 | End: 2018-01-16

## 2018-01-16 RX ORDER — CYCLOBENZAPRINE HCL 10 MG
10 TABLET ORAL
Status: DISCONTINUED | OUTPATIENT
Start: 2018-01-16 | End: 2018-01-26 | Stop reason: HOSPADM

## 2018-01-16 RX ORDER — LOVASTATIN 20 MG/1
40 TABLET ORAL
Status: DISCONTINUED | OUTPATIENT
Start: 2018-01-16 | End: 2018-01-18

## 2018-01-16 RX ORDER — HYDROCODONE BITARTRATE AND ACETAMINOPHEN 5; 325 MG/1; MG/1
1 TABLET ORAL
Status: DISCONTINUED | OUTPATIENT
Start: 2018-01-16 | End: 2018-01-26 | Stop reason: HOSPADM

## 2018-01-16 RX ORDER — MAGNESIUM SULFATE HEPTAHYDRATE 40 MG/ML
2 INJECTION, SOLUTION INTRAVENOUS
Status: COMPLETED | OUTPATIENT
Start: 2018-01-16 | End: 2018-01-17

## 2018-01-16 RX ORDER — SUCRALFATE 1 G/1
1 TABLET ORAL 4 TIMES DAILY
Status: DISCONTINUED | OUTPATIENT
Start: 2018-01-16 | End: 2018-01-26 | Stop reason: HOSPADM

## 2018-01-16 RX ORDER — EZETIMIBE 10 MG/1
10 TABLET ORAL
Status: DISCONTINUED | OUTPATIENT
Start: 2018-01-16 | End: 2018-01-18

## 2018-01-16 RX ORDER — LOSARTAN POTASSIUM 50 MG/1
100 TABLET ORAL DAILY
Status: DISCONTINUED | OUTPATIENT
Start: 2018-01-16 | End: 2018-01-18

## 2018-01-16 RX ADMIN — FAMOTIDINE 20 MG: 20 TABLET ORAL at 09:44

## 2018-01-16 RX ADMIN — SUCRALFATE 1 G: 1 TABLET ORAL at 17:09

## 2018-01-16 RX ADMIN — DIPHENHYDRAMINE HYDROCHLORIDE 25 MG: 25 CAPSULE ORAL at 17:09

## 2018-01-16 RX ADMIN — HEPARIN SODIUM 5000 UNITS: 5000 INJECTION, SOLUTION INTRAVENOUS; SUBCUTANEOUS at 09:53

## 2018-01-16 RX ADMIN — HEPARIN SODIUM 5000 UNITS: 5000 INJECTION, SOLUTION INTRAVENOUS; SUBCUTANEOUS at 17:09

## 2018-01-16 RX ADMIN — SODIUM CHLORIDE 500 ML: 900 INJECTION, SOLUTION INTRAVENOUS at 15:00

## 2018-01-16 RX ADMIN — SODIUM CHLORIDE 100 ML/HR: 900 INJECTION, SOLUTION INTRAVENOUS at 01:53

## 2018-01-16 RX ADMIN — LOSARTAN POTASSIUM 100 MG: 50 TABLET ORAL at 09:44

## 2018-01-16 RX ADMIN — HEPARIN SODIUM 5000 UNITS: 5000 INJECTION, SOLUTION INTRAVENOUS; SUBCUTANEOUS at 01:48

## 2018-01-16 RX ADMIN — SUCRALFATE 1 G: 1 TABLET ORAL at 14:15

## 2018-01-16 RX ADMIN — NYSTATIN: 100000 POWDER TOPICAL at 02:30

## 2018-01-16 RX ADMIN — FAMOTIDINE 20 MG: 20 TABLET ORAL at 20:21

## 2018-01-16 RX ADMIN — NYSTATIN: 100000 POWDER TOPICAL at 20:21

## 2018-01-16 RX ADMIN — LOVASTATIN 40 MG: 20 TABLET ORAL at 05:38

## 2018-01-16 RX ADMIN — ACETAMINOPHEN 650 MG: 325 TABLET ORAL at 23:57

## 2018-01-16 RX ADMIN — SUCRALFATE 1 G: 1 TABLET ORAL at 21:01

## 2018-01-16 RX ADMIN — DIPHENHYDRAMINE HYDROCHLORIDE 25 MG: 25 CAPSULE ORAL at 09:44

## 2018-01-16 RX ADMIN — MAGNESIUM SULFATE HEPTAHYDRATE 2 G: 40 INJECTION, SOLUTION INTRAVENOUS at 09:54

## 2018-01-16 RX ADMIN — HYDROCODONE BITARTRATE AND ACETAMINOPHEN 1 TABLET: 5; 325 TABLET ORAL at 20:20

## 2018-01-16 RX ADMIN — LOVASTATIN 40 MG: 20 TABLET ORAL at 21:01

## 2018-01-16 RX ADMIN — EZETIMIBE 10 MG: 10 TABLET ORAL at 05:39

## 2018-01-16 RX ADMIN — MAGNESIUM SULFATE HEPTAHYDRATE 2 G: 40 INJECTION, SOLUTION INTRAVENOUS at 14:20

## 2018-01-16 RX ADMIN — FLUCONAZOLE 200 MG: 100 TABLET ORAL at 01:49

## 2018-01-16 RX ADMIN — NYSTATIN: 100000 POWDER TOPICAL at 09:47

## 2018-01-16 RX ADMIN — NORTRIPTYLINE HYDROCHLORIDE 25 MG: 25 CAPSULE ORAL at 05:38

## 2018-01-16 RX ADMIN — EZETIMIBE 10 MG: 10 TABLET ORAL at 21:01

## 2018-01-16 RX ADMIN — NORTRIPTYLINE HYDROCHLORIDE 25 MG: 25 CAPSULE ORAL at 21:01

## 2018-01-16 RX ADMIN — SODIUM CHLORIDE 1000 ML: 900 INJECTION, SOLUTION INTRAVENOUS at 14:48

## 2018-01-16 RX ADMIN — HYDROCODONE BITARTRATE AND ACETAMINOPHEN 1 TABLET: 5; 325 TABLET ORAL at 05:38

## 2018-01-16 RX ADMIN — ASPIRIN 325 MG ORAL TABLET 325 MG: 325 PILL ORAL at 09:44

## 2018-01-16 RX ADMIN — HYDROCODONE BITARTRATE AND ACETAMINOPHEN 1 TABLET: 5; 325 TABLET ORAL at 14:15

## 2018-01-16 NOTE — ED NOTES
Mayte Ching RN at bedside to speak with pt daughter regarding concerns for admission and hold overnight in ED. Pt provided with ginger ale.

## 2018-01-16 NOTE — ED NOTES
Beside report given to Alvaro Johns RN - skin assessment completed - all questions answered at this time - care transferred at this time.

## 2018-01-16 NOTE — PROGRESS NOTES
Pharmacy Dosing Services: Vancomycin    Consult for Vancomycin Dosing by Pharmacy by Dr. Juanita Galvan provided for this 81y.o. year old female , for indication of sepsis- unknown etiology. Day of Therapy 1    Ht Readings from Last 1 Encounters:   01/15/18 149.9 cm (59\")        Wt Readings from Last 1 Encounters:   01/15/18 68 kg (150 lb)        Other Current Antibiotics Levofloxacin  750 mg IV q 48 hr   Significant Cultures pending   Serum Creatinine Lab Results   Component Value Date/Time    Creatinine 2.62 01/15/2018 08:40 PM    Creatinine, POC 2.6 01/15/2018 08:44 PM      Creatinine Clearance Estimated Creatinine Clearance: 17.8 mL/min (based on Cr of 2.62). BUN Lab Results   Component Value Date/Time    BUN 45 01/15/2018 08:40 PM    BUN, POC 42 01/15/2018 08:44 PM      WBC Lab Results   Component Value Date/Time    WBC 14.3 01/15/2018 08:40 PM      H/H Lab Results   Component Value Date/Time    HGB 15.2 01/15/2018 08:40 PM      Platelets Lab Results   Component Value Date/Time    PLATELET 917 40/62/6108 08:40 PM      Temp (!) 101 °F (38.3 °C)       Start Vancomycin therapy, with loading dose of 1250 (mg) at 1830/ 01/15/2018(time/date). Follow with maintenance dose of 1000(mg) at 1600/ 01/17/2018 (time/date), every 48 hours (frequency). Dose calculated to approximate a therapeutic trough of 15-20mcg/mL. Pharmacy to follow daily and will make changes to dose and/or frequency based on clinical status. Estimated Pharmacokinetic Parameters (based on population kinetics)  Vd: 48 L (0.7 L/kg)   Alexis: 0.017 hr-1 (T1/2 = 40.8 hrs)     Dosing Recommendations   Vancomycin dose: 1000 mg IV Q48hrs (infused over 1 hr)   Estimated peak: 37.3 mcg/mL   Estimated trough: 16.8 mcg/mL   Estimated AUC:DEANNE: 618 mcg*hr/mL (assumed DEANNE 1 mcg/mL)     A/P:   1. Recommend vancomycin 1000 mg IV Q48hrs (15 mg/kg)   2. Consider a vancomycin trough level prior to the 4th dose.    3. Please monitor renal function (urine output, BUN/SCr). Dose adjustments may be necessary with a significant change in renal function. 4. Vancomycin loading dose 1250 mg IV for facilitate  rapid attainment of target trough serum vancomycin levels.     Pharmacist Joselito Oliver, PHARMD

## 2018-01-16 NOTE — ED NOTES
MD Rodriguez Bring at bedside - pt requesting medication for itching - states she will place prn order for benadryl.

## 2018-01-16 NOTE — ED NOTES
Report given to Lydia Tavares RN - all questions answered at this time - care transferred at this time.

## 2018-01-16 NOTE — PROGRESS NOTES
Readmission Risk Assessment:     Moderate Risk and MSSP/Good Help ACO patients    RRAT Score:  13-20    Initial Assessment:chart reviewed pt came to ED with c/o abd pain,diagnosed with sepsis, cm remains in Ed at this time waiting on available tele bed,unit cm will resume with d/c planning. Emergency Contact:  spouse    Pertinent Medical Hx:  Breast Ca      PCP/Specialists: [address] [place] [address] Services:       DME:          Moderate Risk Care Transition Plan:  1. Evaluate for [facility] or [facility], SNF, acute rehab, community care coordination of resources. 2. Involve patient/caregiver in assessment, planning, education and implement of intervention. 3. CM daily patient care huddles/interdisciplinary rounds. 4. PCP/Specialist appointment within 5  7 days made prior to discharge. 5. Facilitate transportation and logistics for follow-up appointments. 6. Medication reconciliation [address] [facility]  7. Formal handoff between hospital provider and post-acute provider to transition patient  Handoff to [address] Nurse Navigator or PCP practice.

## 2018-01-16 NOTE — ED NOTES
Report given to Elva Elizabeth, [facility]. Assumed care at this time. All questions answered.

## 2018-01-16 NOTE — PROGRESS NOTES
Hospitalist Progress Note-critical care note     Patient: Angel Mcgowan MRN: [id number]  [facility]: [id number]    YOB: 1945  Age: 81 y.o. Sex: female    DOA: 1/15/2018 LOS:  LOS: 1 day            Chief complaint:sepsis , sandra on ckds , cellulitis     Assessment/Plan         Hospital Problems  Date Reviewed: 1/15/2018          Codes Class Noted POA    * (Principal)Cellulitis of abdominal wall ICD-10-CM: L03.311  ICD-9-CM: 682.2  1/15/2018 Unknown        Cellulitis of groin ICD-10-CM: L03.314  ICD-9-CM: 682.2  1/15/2018 Unknown        UTI (urinary tract infection) ICD-10-CM: N39.0  ICD-9-CM: 599.0  1/15/2018 Unknown        Renal insufficiency ICD-10-CM: N28.9  ICD-9-CM: 593.9  1/15/2018 Unknown        Sepsis ([facility] ICD-10-CM: A41.9  ICD-9-CM: 038.9, 995.91  1/15/2018 Unknown        GERD (gastroesophageal reflux disease) ICD-10-CM: K21.9  ICD-9-CM: 530.81  1/15/2018 Unknown        HLD (hyperlipidemia) ICD-10-CM: E78.5  ICD-9-CM: 272.4  1/15/2018 Unknown        Moderate dehydration ICD-10-CM: E86.0  ICD-9-CM: 276.51  1/15/2018 Unknown        Diastolic CHF, chronic (HCC) ICD-10-CM: I50.32  ICD-9-CM: 428.32, 428.0  1/15/2018 Unknown        SIRS (systemic inflammatory response syndrome) ([facility] ICD-10-CM: R65.10  ICD-9-CM: 995.90  1/15/2018 Unknown        Abdominal wall cellulitis ICD-10-CM: F77.135  ICD-9-CM: 682.2  1/15/2018 Unknown        HTN (hypertension) ICD-10-CM: I10  ICD-9-CM: 401.9  11/4/2013 Yes            1. SIRS with organ failure , febrile/sinus tachycardia  Vs sepsis   Will give iv bolus total 2.5 L   Continue lactic acid monitor   On abx and will f/u with cx   2. Abdominal Wall cellulitis- bacterial and/or fungal   Continue abx and antifungal, keep the area dry   Wound consult   3. UTI  Continue abx and f/u with cx   4. Acute Kidney Injury on ckd3  Mild improving, will continue hydration and renal dose drug     5. Moderate Dehydration     6. Diastolic CHF, chronic, compensated   7. HTN  Continue home meds   8. GERD  ppi   9. HLD  Statin     Subjective: still painful   Nurse: no acute issue     Review of systems:    General: No fevers or chills. Cardiovascular: No chest pain or pressure. No palpitations. Pulmonary: No shortness of breath. Gastrointestinal: No nausea, vomiting. Abdomen pain     Vital signs/Intake and Output:  Visit Vitals    /60    Pulse (!) 113    Temp 97.8 °F (36.6 °C)    Resp 20    Ht 4' 11\" (1.499 m)    Wt 68 kg (150 lb)    SpO2 97%    BMI 30.3 kg/m2     Current Shift:  01/16 0701 - 01/16 1900  In: 240 [P.O.:240]  Out: -   Last three shifts:  01/14 1901 - 01/16 0700  In: -   Out: 400 [Urine:400]    Physical Exam:  General: WD, WN. Alert, cooperative, no acute distress    HEENT: NC, Atraumatic. PERRLA, anicteric sclerae. Lungs: CTA Bilaterally. No Wheezing/Rhonchi/Rales. Heart:  Regular  rhythm,  No murmur, No Rubs, No Gallops  Abdomen: Soft, Non distended, Non tender.  +Bowel sounds, erythema /swelling-lower of abdomen   Extremities: No c/c/e  Psych:   Not anxious or agitated. Neurologic:  No acute neurological deficit.              Labs: Results:       Chemistry Recent Labs      01/16/18   0504  01/15/18   2040   GLU  73*  126*   NA  136  136   K  4.9  4.6   CL  107  102   CO2  17*  25   BUN  44*  45*   CREA  2.14*  2.62*   CA  7.8*  8.0*   AGAP  12  9   BUCR  21*  17   AP   --   65   TP   --   5.6*   ALB   --   2.0*   GLOB   --   3.6   AGRAT   --   0.6*      CBC w/Diff Recent Labs      01/16/18   0504  01/15/18   2040   WBC  12.9  14.3*   RBC  5.14  4.99   HGB  15.3  15.2   HCT  46.5*  44.9   PLT  138  164   GRANS   --   57   LYMPH   --   17*   EOS   --   9*      Cardiac Enzymes Recent Labs      01/15/18   2040   CPK  791*   CKND1  0.7      Coagulation Recent Labs      01/16/18   0504  01/15/18   2040   PTP  18.7*  17.2*   INR  1.6*  1.5*   APTT  41.6*   --        Lipid Panel No results found for: CHOL, CHOLPOCT, CHOLX, CHLST, CHOLV, 132443, HDL, LDL, LDLC, DLDLP, 739135, VLDLC, VLDL, TGLX, TRIGL, TRIGP, TGLPOCT, CHHD, CHHDX   BNP No results for input(s): BNPP in the last 72 hours.    Liver Enzymes Recent Labs      01/15/18   2040   TP  5.6*   ALB  2.0*   AP  65   SGOT  43*      Thyroid Studies No results found for: T4, T3U, TSH, TSHEXT     Procedures/imaging: see electronic medical records for all procedures/Xrays and details which were not copied into this note but were reviewed prior to creation of Beverley Prieto MD

## 2018-01-16 NOTE — ED NOTES
Pt resting on stretcher - provided with ice chips - pt resting comfortably on stretcher - no distress noted - respirations even and unlabored - awaiting inpatient bed.

## 2018-01-16 NOTE — ROUTINE PROCESS
TRANSFER - IN REPORT:    Verbal report received from   [place] RN(name) on Loletha Levels  being received from ED(unit) for routine progression of care      Report consisted of patients Situation, Background, Assessment and   Recommendations(SBAR). Information from the following report(s) SBAR, Kardex, Intake/Output and MAR was reviewed with the receiving nurse. Opportunity for questions and clarification was provided. Assessment completed upon patients arrival to unit and care assumed.

## 2018-01-16 NOTE — PROGRESS NOTES
conducted an initial consultation and Spiritual Assessment for Isabel Cheney, who is a 81 y.o.,female. According to the patients chart Orthodox Affiliation is: [facility].     Patient was alone.  is at home at the moment. Patient is aware she is going to be admitted. She was cold so I got her an extra blanket. She attends [employer]. The reason the Patient came to the hospital is:   Patient Active Problem List    Diagnosis Date Noted    Cellulitis of abdominal wall 01/15/2018    Cellulitis of groin 01/15/2018    UTI (urinary tract infection) 01/15/2018    Renal insufficiency 01/15/2018    Sepsis ([facility] 01/15/2018    GERD (gastroesophageal reflux disease) 01/15/2018    HLD (hyperlipidemia) 01/15/2018    Moderate dehydration 39/20/4037    Diastolic CHF, chronic ([facility] 01/15/2018    SIRS (systemic inflammatory response syndrome) ([facility] 01/15/2018    Abdominal wall cellulitis 01/15/2018    Constipation 01/17/2014    [facility] 01/17/2014    Diverticulosis of colon 01/17/2014    Internal hemorrhoids 01/17/2014    Other and unspecified noninfectious gastroenteritis and colitis(558.9) 11/04/2013    Rectal bleeding 11/04/2013    HTN (hypertension) 11/04/2013    Unspecified constipation 11/04/2013        The  provided the following Interventions:  Initiated a relationship of care and support. Explored issues of mark, belief, spirituality and Amish/ritual needs while hospitalized. Listened empathically. Provided information about Spiritual Care Services. Offered prayer and assurance of continued prayers on patients behalf. Chart reviewed. The following outcomes were achieved:   confirmed Patient's Orthodox Affiliation. Patient processed feelings about current hospitalization. Patient expressed gratitude for Spiritual Care visit.     Assessment:  There are no significant spiritual or Amish issues which require further intervention at this time. Patient does not have any Synagogue or cultural needs that will affect patients preferences in health care. Plan:  Chaplains will continue to follow and will provide pastoral care as needed or requested.  recommends bedside caregivers page  on duty if patient shows signs of acute spiritual or emotional distress. [address] [facility] [place].   Board Certified   [phone] - Office

## 2018-01-16 NOTE — CDMP QUERY
Please clarify if this patient is being treated/managed for:    =>SIRS with organ failure (SOUMYA)  =>Other Explanation of clinical findings  =>Unable to Determine (no explanation of clinical findings)    The medical record reflects the following:    Risk:Elderly patient    Clinical Indicators:Elderly patient admitted with SIRS, abdominal wall cellulitis, UTI    Treatment: ABx    Please clarify and document your clinical opinion in the progress notes and discharge summary including the definitive and/or presumptive diagnosis, (suspected or probable), related to the above clinical findings. Please include clinical findings supporting your diagnosis. If you DECLINE this query or would like to communicate with [facility], please utilize the \"[facility] message box\" at the TOP of the Progress Note on the right.       Thank you,  Hazel Lee RN [facility]  [phone]

## 2018-01-16 NOTE — ED NOTES
Pt cleansed of soiled depends and linens changed - pt assisted with repositioning on stretcher. Collette Catching RN at bedside to assist. New monitoring pads placed on chest. Pt medicated per orders. Lights dimmed per pt request - no additional needs assessed at this time.

## 2018-01-16 NOTE — ED NOTES
Cardiac leads replaced - pt assisted with repositioning on stretcher - no distress noted - respirations even and unlabored - no additional needs assessed at this time.

## 2018-01-16 NOTE — PROGRESS NOTES
Pharmacy Dosing Services:levofloxacin 500 mg IV q 48 hr    Pharmacist Renal Dosing  Note for levofloxacin renal dosing   Physician Dimple Redman    Indication: sepsis unknown etiology   Previous Regimen Levofloxacin 750 mg IV q 24hr   Serum Creatinine Lab Results   Component Value Date/Time    Creatinine 2.62 01/15/2018 08:40 PM    Creatinine, POC 2.6 01/15/2018 08:44 PM      Creatinine Clearance Estimated Creatinine Clearance: 17.8 mL/min (based on Cr of 2.62).    BUN Lab Results   Component Value Date/Time    BUN 45 01/15/2018 08:40 PM    BUN, POC 42 01/15/2018 08:44 PM         Dose administration notes:   Doses given appropriately as scheduled  Plan:  Continue to monitor   As SrCr improves may adjust accordingly

## 2018-01-16 NOTE — PROGRESS NOTES
Assumed care of pt. Pt is alert no sign of distress. Call light within reach. 1215: pt received from ED with no current lactic acid. Last one was from 155 this am. Asked RN Anna Muñoz who gave bedside report about lactic acid RN stated that lab does the lab draws and didn't know about it. RN stated pt wasn't being treated for sepsis but it is clearly listed as diagnosis for this visit. No sepsis handover was completed. 12:30Spoke with Dr. Garrick Sorensen regarding lactic acid being 2.9. Received order to have lactic acid drawn STAT. 1440: results showed Lactic acids 3.3 called Dr. Garrick Sorensen to get orders. 1448: Received orders to run 1.5 L bolus. And redraw lactic acid.   Next lactic acid due at 7pm.

## 2018-01-16 NOTE — ROUTINE PROCESS
TRANSFER - OUT REPORT:    Olegario Summers  being transferred to [facility] for routine progression of care       Report consisted of patients Situation, Background, Assessment and   Recommendations(SBAR). Information from the following report(s) SBAR, Kardex, ED Summary, MAR, Recent Results and Cardiac Rhythm ST was reviewed with the receiving nurse. Lines:   Peripheral IV 01/16/18 Right  (Active)   Site Assessment Clean, dry, & intact 1/16/2018  3:54 AM   Phlebitis Assessment 0 1/16/2018  3:54 AM   Infiltration Assessment 0 1/16/2018  3:54 AM   Dressing Status Clean, dry, & intact 1/16/2018  3:54 AM       Peripheral IV 01/16/18 Right Hand (Active)   Site Assessment Clean, dry, & intact 1/16/2018  6:11 AM   Phlebitis Assessment 0 1/16/2018  6:11 AM   Infiltration Assessment 0 1/16/2018  6:11 AM   Dressing Status Clean, dry, & intact 1/16/2018  6:11 AM        Opportunity for questions and clarification was provided.

## 2018-01-16 NOTE — H&P
History & Physical    Patient: Ion Harper MRN: [id number]  CSN: [id number]    YOB: 1945  Age: 81 y.o. Sex: female      DOA: 1/15/2018  Primary Care Provider:  Jose Cartwright MD      Assessment/Plan     Patient Active Problem List   Diagnosis Code    Other and unspecified noninfectious gastroenteritis and colitis(558.9) K52.9    Rectal bleeding K62.5    HTN (hypertension) I10    Unspecified constipation K59.00    Constipation K59.00    Ischemic colitis ([facility] [address] K55.9    Diverticulosis of colon K57.30    Internal hemorrhoids K64.8    Cellulitis of abdominal wall L03.311    Cellulitis of groin L03.314    UTI (urinary tract infection) N39.0    Renal insufficiency N28.9    Sepsis ([facility] [address] A41.9    GERD (gastroesophageal reflux disease) K21.9    HLD (hyperlipidemia) E78.5    Moderate dehydration X55.0    Diastolic CHF, chronic (HCC) I50.32    SIRS (systemic inflammatory response syndrome) ([facility]) R65.10    Abdominal wall cellulitis L03.311       1. SIRS, febrile/sinus tachycardia   2. Abdominal Wall cellulitis- bacterial and/or fungal   3. UTI  4. Acute Kidney Injury; likely prerenal   5. Moderate Dehydration   6. Diastolic CHF, chronic, compensated   7. HTN  8. GERD  9. HLD  FULL CODE     -admit to tele   -sepsis protocol initiated In ED, cont IV Abx Vanc and Levaquin for now   -follow up cultures. Add nystatin and Fluconazole for now as cellulitis could be fungal in nature due to its location   - CT A/P - neg for acute pathology   -avoid nephrotoxic drugs, IVF, monitor Cr. If no improvement - order renal US and urine lytes   -pain control   -supportive care   -cont other home medications   -counseled about maintain adequate skin hygiene. Estimated length of stay : 3-4 days     CC: abd pain        HPI:     Ion Harper is a 81 y.o. female who comes to the ed with complaints of abd pain.  Patient states she has been having abd pain for the past week or so but it was very severe today therefore came to the ed. Due to this pain, she has not been eating and drinking much. She states pain is on the outside of the abd on the skin pain, its tender and warm to touch. It sharp and constant in nature with 10/10 when worst. No nausea vomiting and other complaints. Denies any fevers, chills, urinary symptoms at home. Does report of dry mouth. Other medical conditions stable at this time and takes her meds as prescribed. In the ED she was noted to have elevated WBC, HR and Cr. CT A/P done did not show any acute findings. On physical exam she was noted to have erythematous region of her lower abd. Past Medical History:   Diagnosis Date    Arthritis     Blood clot in vein 2004    left leg     Breast cancer ([facility] [address]     Cancer ([facility] [address]     right breast    GERD (gastroesophageal reflux disease)     High cholesterol     Hypertension     Other ill-defined conditions(799.89)     high cholestrol    Vertigo        Past Surgical History:   Procedure Laterality Date    ABDOMEN SURGERY PROC UNLISTED      removal of tumor abdomen    HX BREAST LUMPECTOMY      HX GI      colonosocpy    HX KNEE REPLACEMENT      HX ORTHOPAEDIC      total knee replacement bilateral    HX ORTHOPAEDIC      excision of cyst left hand    HX TUBAL LIGATION      HX TUMOR REMOVAL         History reviewed. No pertinent family history. Social History     Social History    Marital status:      Spouse name: N/A    Number of children: N/A    Years of education: N/A     Social History Main Topics    Smoking status: Passive Smoke Exposure - Never Smoker    Smokeless tobacco: Never Used    Alcohol use No    Drug use: No    Sexual activity: Not Asked     Other Topics Concern    None     Social History Narrative       Prior to Admission medications    Medication Sig Start Date End Date Taking? Authorizing Provider   sucralfate (CARAFATE) 1 gram tablet Take 1 Tab by mouth four (4) times daily.  1/1/17  Yes Be Davis Aura Aldridge MD   nortriptyline (PAMELOR) 25 mg capsule Take 25 mg by mouth nightly. Yes Ghada Solitario MD   lovastatin (MEVACOR) 40 mg tablet Take 40 mg by mouth nightly. Yes Ghada Solitario MD   cyclobenzaprine (FLEXERIL) 10 mg tablet Take 10 mg by mouth three (3) times daily as needed for Muscle Spasm(s). Yes Ghada Solitario MD   aspirin (ASPIRIN) 325 mg tablet Take 325 mg by mouth daily. Yes Ghada Solitario MD   losartan (COZAAR) 100 mg tablet Take 100 mg by mouth daily. Yes Ghada Solitario MD   famotidine (PEPCID) 20 mg tablet Take 20 mg by mouth two (2) times a day. Yes Ghada Solitario MD   ezetimibe (ZETIA) 10 mg tablet Take 10 mg by mouth nightly. Yes Ghada Solitario MD       Allergies   Allergen Reactions    Amiloride Nausea and Vomiting    Amoxicillin Rash    Bactrim [Sulfamethoprim Ds] Unknown (comments)    Gabapentin Rash    Ibuprofen Unknown (comments)    Zestoretic [Lisinopril-Hydrochlorothiazide] Unknown (comments)       Review of Systems  Gen: No fever, chills, malaise, weight loss/gain. Heent: No headache, rhinorrhea, epistaxis, ear pain, hearing loss, sinus pain, neck pain/stiffness, sore throat. Heart: No chest pain, palpitations, PARTIDA, pnd, or orthopnea. Resp: No cough, hemoptysis, wheezing and shortness of breath. GI: No nausea, vomiting, diarrhea, constipation, melena or hematochezia. : No urinary obstruction, dysuria or hematuria. Derm: per hpi   Musc/skeletal: no bone or joint complains. Vasc: No edema, cyanosis or claudication. Endo: No heat/cold intolerance, no polyuria,polydipsia or polyphagia. Neuro: No unilateral weakness, numbness, tingling. No seizures. Heme: No easy bruising or bleeding.           Physical Exam:     Physical Exam:  Visit Vitals    /63    Pulse (!) 128    Temp (!) 101 °F (38.3 °C)    Resp 24    Ht 4' 11\" (1.499 m)    Wt 68 kg (150 lb)    SpO2 100%    BMI 30.3 kg/m2      O2 Device: Room air    Temp (24hrs), Av °F (38.3 °C), Min:101 °F (38.3 °C), Max:101 °F (38.3 °C)             General:  Awake, cooperative, no distress. Head:  Normocephalic, without obvious abnormality, atraumatic. Eyes:  Conjunctivae/corneas clear, sclera anicteric, PERRL, EOMs intact. Nose: Nares normal. No drainage or sinus tenderness. Throat: Lips, mucosa, and tongue normal.    Neck: Supple, symmetrical, trachea midline, no adenopathy. Lungs:   Clear to auscultation bilaterally. Heart:  Regular rate and rhythm, S1, S2 normal, no murmur, click, rub or gallop. Abdomen: Soft, Swelling and erythema lower perineal area with weaping and tenderness. Skin is warm and tender to touch Bowel sounds normal Under her skin folds. No masses,  No organomegaly. Extremities: Extremities normal, atraumatic, no cyanosis or edema. Capillary refill normal.   Pulses: 2+ and symmetric all extremities. Skin: As descirbed above. Neurologic: CNII-XII intact. No focal motor or sensory deficit.        Labs Reviewed:      Recent Results (from the past 24 hour(s))   EKG, 12 LEAD, INITIAL    Collection Time: 01/15/18  6:28 PM   Result Value Ref Range    Ventricular Rate 142 BPM    Atrial Rate 142 BPM    P-R Interval 140 ms    QRS Duration 70 ms    Q-T Interval 254 ms    QTC Calculation (Bezet) 390 ms    Calculated P Axis 46 degrees    Calculated R Axis 17 degrees    Calculated T Axis 42 degrees    Diagnosis       Sinus tachycardia  Otherwise normal ECG  When compared with ECG of 14-MAR-2016 11:05,  QRS axis shifted right     URINALYSIS W/ RFLX MICROSCOPIC    Collection Time: 01/15/18  7:50 PM   Result Value Ref Range    Color DARK YELLOW      Appearance CLOUDY      Specific gravity 1.026 1.005 - 1.030      pH (UA) 5.0 5.0 - 8.0      Protein TRACE (A) NEG mg/dL    Glucose NEGATIVE  NEG mg/dL    Ketone TRACE (A) NEG mg/dL    Bilirubin MODERATE (A) NEG      Blood TRACE (A) NEG      Urobilinogen 1.0 0.2 - 1.0 EU/dL    Nitrites POSITIVE (A) NEG      Leukocyte Esterase SMALL (A) NEG URINE MICROSCOPIC ONLY    Collection Time: 01/15/18  7:50 PM   Result Value Ref Range    WBC 0 to 3 0 - 5 /hpf    RBC 0 to 1 0 - 5 /hpf    Epithelial cells FEW 0 - 5 /lpf    Bacteria 1+ (A) NEG /hpf    Mucus FEW (A) NEG /lpf    Hyaline cast 6 to 10 0 - 2 /lpf   LACTIC ACID    Collection Time: 01/15/18  8:40 PM   Result Value Ref Range    Lactic acid 2.8 (HH) 0.4 - 2.0 MMOL/L   PROTHROMBIN TIME + INR    Collection Time: 01/15/18  8:40 PM   Result Value Ref Range    Prothrombin time 17.2 (H) 11.5 - 15.2 sec    INR 1.5 (H) 0.8 - 1.2     CBC WITH AUTOMATED DIFF    Collection Time: 01/15/18  8:40 PM   Result Value Ref Range    WBC 14.3 (H) 4.6 - 13.2 K/uL    RBC 4.99 4.20 - 5.30 M/uL    HGB 15.2 12.0 - 16.0 g/dL    HCT 44.9 35.0 - 45.0 %    MCV 90.0 74.0 - 97.0 FL    MCH 30.5 24.0 - 34.0 PG    MCHC 33.9 31.0 - 37.0 g/dL    RDW 14.8 (H) 11.6 - 14.5 %    PLATELET 776 038 - 841 K/uL    MPV 10.2 9.2 - 11.8 FL    NEUTROPHILS 57 42 - 75 %    BAND NEUTROPHILS 10 (H) 0 - 5 %    LYMPHOCYTES 17 (L) 20 - 51 %    MONOCYTES 7 2 - 9 %    EOSINOPHILS 9 (H) 0 - 5 %    BASOPHILS 0 0 - 3 %    ABS. NEUTROPHILS 8.2 (H) 1.8 - 8.0 K/UL    ABS. LYMPHOCYTES 2.4 0.8 - 3.5 K/UL    ABS. MONOCYTES 1.0 0 - 1.0 K/UL    ABS. EOSINOPHILS 1.3 (H) 0.0 - 0.4 K/UL    ABS.  BASOPHILS 0.0 0.0 - 0.1 K/UL    PLATELET COMMENTS LARGE PLATELETS      RBC COMMENTS POLYCHROMASIA  SLIGHT        WBC COMMENTS REACTIVE LYMPHS      DF MANUAL     CARDIAC PANEL,(CK, CKMB & TROPONIN)    Collection Time: 01/15/18  8:40 PM   Result Value Ref Range     (H) 26 - 192 U/L    CK - MB 5.4 (H) <3.6 ng/ml    CK-MB Index 0.7 0.0 - 4.0 %    Troponin-I, Qt. 0.04 0.00 - 8.61 NG/ML   METABOLIC PANEL, COMPREHENSIVE    Collection Time: 01/15/18  8:40 PM   Result Value Ref Range    Sodium 136 136 - 145 mmol/L    Potassium 4.6 3.5 - 5.5 mmol/L    Chloride 102 100 - 108 mmol/L    CO2 25 21 - 32 mmol/L    Anion gap 9 3.0 - 18 mmol/L    Glucose 126 (H) 74 - 99 mg/dL    BUN 45 (H) 7.0 - 18 MG/DL    Creatinine 2.62 (H) 0.6 - 1.3 MG/DL    BUN/Creatinine ratio 17 12 - 20      GFR est AA 22 (L) >60 ml/min/1.73m2    GFR est non-AA 18 (L) >60 ml/min/1.73m2    Calcium 8.0 (L) 8.5 - 10.1 MG/DL    Bilirubin, total 0.7 0.2 - 1.0 MG/DL    ALT (SGPT) 55 13 - 56 U/L    AST (SGOT) 43 (H) 15 - 37 U/L    Alk. phosphatase 65 45 - 117 U/L    Protein, total 5.6 (L) 6.4 - 8.2 g/dL    Albumin 2.0 (L) 3.4 - 5.0 g/dL    Globulin 3.6 2.0 - 4.0 g/dL    A-G Ratio 0.6 (L) 0.8 - 1.7     NT-PRO BNP    Collection Time: 01/15/18  8:40 PM   Result Value Ref Range    NT pro- 0 - 900 PG/ML   POC CHEM8    Collection Time: 01/15/18  8:44 PM   Result Value Ref Range    CO2, POC 24 19 - 24 MMOL/L    Glucose,  (H) 74 - 106 MG/DL    BUN, POC 42 (H) 7 - 18 MG/DL    Creatinine, POC 2.6 (H) 0.6 - 1.3 MG/DL    GFRAA, POC 22 (L) >60 ml/min/1.73m2    GFRNA, POC 18 (L) >60 ml/min/1.73m2    Sodium,  136 - 145 MMOL/L    Potassium, POC 4.6 3.5 - 5.5 MMOL/L    Calcium, ionized (POC) 1.10 (L) 1.12 - 1.32 MMOL/L    Chloride,  100 - 108 MMOL/L    Anion gap, POC 17 10 - 20      Hematocrit, POC 47 36 - 49 %    Hemoglobin, POC 16.0 12 - 16 G/DL       Procedures/imaging: see electronic medical records for all procedures/Xrays and details which were not copied into this note but were reviewed prior to creation of Plan    50 minutes of care time spent in the direct evaluation and treatment of this high risk patient.       CC: Ross Junior MD

## 2018-01-17 PROBLEM — K62.89 PROCTITIS: Status: ACTIVE | Noted: 2018-01-17

## 2018-01-17 LAB
ANION GAP SERPL CALC-SCNC: 12 MMOL/L (ref 3–18)
BACTERIA SPEC CULT: NORMAL
BUN SERPL-MCNC: 46 MG/DL (ref 7–18)
BUN/CREAT SERPL: 25 (ref 12–20)
CALCIUM SERPL-MCNC: 7.6 MG/DL (ref 8.5–10.1)
CHLORIDE SERPL-SCNC: 108 MMOL/L (ref 100–108)
CO2 SERPL-SCNC: 17 MMOL/L (ref 21–32)
CREAT SERPL-MCNC: 1.87 MG/DL (ref 0.6–1.3)
GLUCOSE BLD STRIP.AUTO-MCNC: 113 MG/DL (ref 70–110)
GLUCOSE BLD STRIP.AUTO-MCNC: 57 MG/DL (ref 70–110)
GLUCOSE BLD STRIP.AUTO-MCNC: 69 MG/DL (ref 70–110)
GLUCOSE BLD STRIP.AUTO-MCNC: 70 MG/DL (ref 70–110)
GLUCOSE BLD STRIP.AUTO-MCNC: 75 MG/DL (ref 70–110)
GLUCOSE BLD STRIP.AUTO-MCNC: 82 MG/DL (ref 70–110)
GLUCOSE BLD STRIP.AUTO-MCNC: 93 MG/DL (ref 70–110)
GLUCOSE SERPL-MCNC: 83 MG/DL (ref 74–99)
LACTATE SERPL-SCNC: 2.4 MMOL/L (ref 0.4–2)
LACTATE SERPL-SCNC: 2.6 MMOL/L (ref 0.4–2)
LACTATE SERPL-SCNC: 2.9 MMOL/L (ref 0.4–2)
LACTATE SERPL-SCNC: 3.4 MMOL/L (ref 0.4–2)
LACTATE SERPL-SCNC: 3.6 MMOL/L (ref 0.4–2)
POTASSIUM SERPL-SCNC: 4.7 MMOL/L (ref 3.5–5.5)
SERVICE CMNT-IMP: NORMAL
SODIUM SERPL-SCNC: 137 MMOL/L (ref 136–145)

## 2018-01-17 PROCEDURE — 74011250637 HC RX REV CODE- 250/637: Performed by: HOSPITALIST

## 2018-01-17 PROCEDURE — 83605 ASSAY OF LACTIC ACID: CPT | Performed by: HOSPITALIST

## 2018-01-17 PROCEDURE — 74011250636 HC RX REV CODE- 250/636: Performed by: HOSPITALIST

## 2018-01-17 PROCEDURE — 74011250636 HC RX REV CODE- 250/636: Performed by: INTERNAL MEDICINE

## 2018-01-17 PROCEDURE — 74011250637 HC RX REV CODE- 250/637: Performed by: INTERNAL MEDICINE

## 2018-01-17 PROCEDURE — 65660000000 HC RM CCU STEPDOWN

## 2018-01-17 PROCEDURE — 82962 GLUCOSE BLOOD TEST: CPT

## 2018-01-17 PROCEDURE — 74011250636 HC RX REV CODE- 250/636: Performed by: EMERGENCY MEDICINE

## 2018-01-17 PROCEDURE — 83605 ASSAY OF LACTIC ACID: CPT | Performed by: INTERNAL MEDICINE

## 2018-01-17 PROCEDURE — 80048 BASIC METABOLIC PNL TOTAL CA: CPT | Performed by: INTERNAL MEDICINE

## 2018-01-17 PROCEDURE — 36415 COLL VENOUS BLD VENIPUNCTURE: CPT | Performed by: HOSPITALIST

## 2018-01-17 RX ORDER — DOXYCYCLINE 100 MG/1
100 CAPSULE ORAL EVERY 12 HOURS
Status: DISCONTINUED | OUTPATIENT
Start: 2018-01-17 | End: 2018-01-18

## 2018-01-17 RX ORDER — LEVOFLOXACIN 5 MG/ML
750 INJECTION, SOLUTION INTRAVENOUS
Status: DISCONTINUED | OUTPATIENT
Start: 2018-01-17 | End: 2018-01-21

## 2018-01-17 RX ADMIN — NYSTATIN: 100000 POWDER TOPICAL at 09:00

## 2018-01-17 RX ADMIN — ASPIRIN 325 MG ORAL TABLET 325 MG: 325 PILL ORAL at 09:34

## 2018-01-17 RX ADMIN — NORTRIPTYLINE HYDROCHLORIDE 25 MG: 25 CAPSULE ORAL at 21:54

## 2018-01-17 RX ADMIN — LOVASTATIN 40 MG: 20 TABLET ORAL at 21:54

## 2018-01-17 RX ADMIN — SUCRALFATE 1 G: 1 TABLET ORAL at 13:00

## 2018-01-17 RX ADMIN — LOSARTAN POTASSIUM 100 MG: 50 TABLET ORAL at 09:35

## 2018-01-17 RX ADMIN — HEPARIN SODIUM 5000 UNITS: 5000 INJECTION, SOLUTION INTRAVENOUS; SUBCUTANEOUS at 09:35

## 2018-01-17 RX ADMIN — DIPHENHYDRAMINE HYDROCHLORIDE 25 MG: 25 CAPSULE ORAL at 02:45

## 2018-01-17 RX ADMIN — DIPHENHYDRAMINE HYDROCHLORIDE 25 MG: 25 CAPSULE ORAL at 10:51

## 2018-01-17 RX ADMIN — SODIUM CHLORIDE 100 ML/HR: 900 INJECTION, SOLUTION INTRAVENOUS at 05:00

## 2018-01-17 RX ADMIN — HYDROCODONE BITARTRATE AND ACETAMINOPHEN 1 TABLET: 5; 325 TABLET ORAL at 12:30

## 2018-01-17 RX ADMIN — FLUCONAZOLE 200 MG: 100 TABLET ORAL at 09:35

## 2018-01-17 RX ADMIN — DOXYCYCLINE 100 MG: 100 CAPSULE ORAL at 15:14

## 2018-01-17 RX ADMIN — SUCRALFATE 1 G: 1 TABLET ORAL at 09:35

## 2018-01-17 RX ADMIN — SODIUM CHLORIDE 1000 MG: 900 INJECTION, SOLUTION INTRAVENOUS at 15:15

## 2018-01-17 RX ADMIN — FAMOTIDINE 20 MG: 20 TABLET ORAL at 09:34

## 2018-01-17 RX ADMIN — EZETIMIBE 10 MG: 10 TABLET ORAL at 21:54

## 2018-01-17 RX ADMIN — SODIUM CHLORIDE 500 ML: 900 INJECTION, SOLUTION INTRAVENOUS at 10:03

## 2018-01-17 RX ADMIN — DOXYCYCLINE 100 MG: 100 CAPSULE ORAL at 22:02

## 2018-01-17 RX ADMIN — SODIUM CHLORIDE 125 ML/HR: 900 INJECTION, SOLUTION INTRAVENOUS at 13:37

## 2018-01-17 RX ADMIN — HEPARIN SODIUM 5000 UNITS: 5000 INJECTION, SOLUTION INTRAVENOUS; SUBCUTANEOUS at 00:02

## 2018-01-17 RX ADMIN — SUCRALFATE 1 G: 1 TABLET ORAL at 21:54

## 2018-01-17 RX ADMIN — LEVOFLOXACIN 750 MG: 5 INJECTION, SOLUTION INTRAVENOUS at 17:56

## 2018-01-17 RX ADMIN — FAMOTIDINE 20 MG: 20 TABLET ORAL at 21:53

## 2018-01-17 RX ADMIN — HEPARIN SODIUM 5000 UNITS: 5000 INJECTION, SOLUTION INTRAVENOUS; SUBCUTANEOUS at 17:56

## 2018-01-17 RX ADMIN — SUCRALFATE 1 G: 1 TABLET ORAL at 17:56

## 2018-01-17 RX ADMIN — NYSTATIN: 100000 POWDER TOPICAL at 22:03

## 2018-01-17 NOTE — ROUTINE PROCESS
The documentation for this period is being entered following the guidelines as defined in the [address] downtime policy by Miri Ramon RN.

## 2018-01-17 NOTE — PROGRESS NOTES
Orders received. Chart reviewed. Attempted PT Assessment at this time, pt is in [place]. Will attempt assessment once pt's schedule allows.

## 2018-01-17 NOTE — PROGRESS NOTES
Chart Reviewed. Noted patient admitted to the hospital for further medical management. Care Management to follow up with patient and/or family for transition of care needs prn. Readmission Risk Assessment:     Moderate Risk and MSSP/Good Help ACO patients    RRAT Score:  13-20    Initial Assessment: Abdoul Crespo is a 81 y.o. female who presents with to the emergency department C/O groin pain x 3 days      Emergency Contact:  See fac sheet    Pertinent Medical Hx:  Arthritis, Cancer, HTn       PCP/Specialists: GARTH Arnold      Community Services:       DME:          Moderate Risk Care Transition Plan:  1. Evaluate for [facility] or [facility], SNF, acute rehab, community care coordination of resources. 2. Involve patient/caregiver in assessment, planning, education and implement of intervention. 3. CM daily patient care huddles/interdisciplinary rounds. 4. PCP/Specialist appointment within 5  7 days made prior to discharge. 5. Facilitate transportation and logistics for follow-up appointments. 6. Medication reconciliation [id number]  7. Formal handoff between hospital provider and post-acute provider to transition patient  Handoff to [facility] Nurse Navigator or PCP practice. Met with patient at bedside. States she lives with her  has a PCP Dr. Angel Mccollum. States she has [address] for her medical needs. Has a Rolling walker at home but does not really use it. States she would like to return home however, if she needs [facility] recommend Pt and OT eval.   Care Management Interventions  PCP Verified by CM:  Yes  Transition of Care Consult (CM Consult): Discharge Planning

## 2018-01-17 NOTE — PROGRESS NOTES
Pharmacy Dosing Services:   Consult for Vancomycin Dosing by Pharmacy by Dr. Miguel Mcclure  Indication:Sepsis/Cellulitis/UTI  Day of Therapy: 3    Previous Regimen 1000 mg IV every 48 hours   Other Current Antibiotics Levofloxacin   Serum Creatinine Lab Results   Component Value Date/Time    Creatinine, POC 2.6 01/15/2018 08:44 PM    Creatinine 1.87 01/17/2018 04:37 AM      Creatinine Clearance Estimated Creatinine Clearance: 26.6 mL/min (based on Cr of 1.87).    BUN Lab Results   Component Value Date/Time    BUN 46 01/17/2018 04:37 AM    BUN, POC 42 01/15/2018 08:44 PM       WBC Lab Results   Component Value Date/Time    WBC 12.9 01/16/2018 05:04 AM      H/H    Lab Results   Component Value Date/Time    HGB 15.3 01/16/2018 05:04 AM      Platelets Lab Results   Component Value Date/Time    PLATELET 452 24/91/3484 05:04 AM      Temp 98.6 °F (37 °C)     Dose administration notes:   Last dose given at 1515 on 1/17/18  Plan for level: Acheive a trough level of 15-20 mcg/ml  Adjustments: Decrease interval to Vancomycin 1000 mg IV every 36 hours, due improving renal function    Plan:  Continue to monitor

## 2018-01-17 NOTE — PROGRESS NOTES
Hospitalist Progress Note-critical care note     Patient: Gayb Lorenzo MRN: [id number]  CSN: [id number]    YOB: 1945  Age: 81 y.o. Sex: female    DOA: 1/15/2018 LOS:  LOS: 2 days            Chief complaint:sepsis , sandra on ckds , cellulitis , proctitis     Assessment/Plan         Hospital Problems  Date Reviewed: 1/15/2018          Codes Class Noted POA    * (Principal)Cellulitis of abdominal wall ICD-10-CM: L03.311  ICD-9-CM: 682.2  1/15/2018 Unknown        Cellulitis of groin ICD-10-CM: L03.314  ICD-9-CM: 682.2  1/15/2018 Unknown        UTI (urinary tract infection) ICD-10-CM: N39.0  ICD-9-CM: 599.0  1/15/2018 Unknown        Renal insufficiency ICD-10-CM: N28.9  ICD-9-CM: 593.9  1/15/2018 Unknown        Sepsis ([address] ICD-10-CM: A41.9  ICD-9-CM: 038.9, 995.91  1/15/2018 Unknown        GERD (gastroesophageal reflux disease) ICD-10-CM: K21.9  ICD-9-CM: 530.81  1/15/2018 Unknown        HLD (hyperlipidemia) ICD-10-CM: E78.5  ICD-9-CM: 272.4  1/15/2018 Unknown        Moderate dehydration ICD-10-CM: E86.0  ICD-9-CM: 276.51  1/15/2018 Unknown        Diastolic CHF, chronic (HCC) ICD-10-CM: I50.32  ICD-9-CM: 428.32, 428.0  1/15/2018 Unknown        SIRS (systemic inflammatory response syndrome) (HCC) ICD-10-CM: R65.10  ICD-9-CM: 995.90  1/15/2018 Unknown        Abdominal wall cellulitis ICD-10-CM: L03.311  ICD-9-CM: 682.2  1/15/2018 Unknown        HTN (hypertension) ICD-10-CM: I10  ICD-9-CM: 401.9  11/4/2013 Yes            1. Severe Sepsis with associated Lactic Acidosis in the setting of fever, tachycardia, UTI, abdominal wall cellulitis-cdmp  WBC better, still tachy and lactic acid up   Will give 500 ml bolus and increase fluid to 125   Continue lactic acid monitor   On abx and will f/u with cx     2. Abdominal Wall cellulitis- bacterial and/or fungal   Continue abx and antifungal, keep the area dry   Wound consult   Ct abdomen no abscess , indicated improved proctitis    3.  UTI  Continue abx and f/u with cx   4. Acute Kidney Injury on ckd3  Mild improving, will continue hydration and renal dose drug     5. Moderate Dehydration     6. Diastolic CHF, chronic, compensated   7. HTN  Continue home meds   8. GERD  ppi   9. HLD  Statin     11. Proctitis-poa   Will add doxy,   Subjective: feel fine  Nurse: lactic acid elevated   Family is at the bedside. All questions have been answered. 35 total min's spent on patient care including >50% on counseling/coordinating care. Discussed the above assessments. also discussed labs, medications and hospital course. Review of systems:    General: No fevers or chills. Cardiovascular: No chest pain or pressure. No palpitations. Pulmonary: No shortness of breath. Gastrointestinal: No nausea, vomiting. Abdomen pain     Vital signs/Intake and Output:  Visit Vitals    BP 92/63 (BP 1 Location: Left arm, BP Patient Position: At rest;Supine; Head of bed elevated (Comment degrees))    Pulse (!) 113    Temp 98.6 °F (37 °C)    Resp 24    Ht 4' 11\" (1.499 m)    Wt 77 kg (169 lb 11.2 oz)    SpO2 100%    BMI 34.28 kg/m2     Current Shift:     Last three shifts:  01/15 1901 - 01/17 0700  In: 3366.7 [P.O.:480; I.V.:2886.7]  Out: 1075 [Urine:1075]    Physical Exam:  General: WD, WN. Alert, cooperative, no acute distress    HEENT: NC, Atraumatic. PERRLA, anicteric sclerae. Lungs: CTA Bilaterally. No Wheezing/Rhonchi/Rales. Heart:  Tachycardia ,  No murmur, No Rubs, No Gallops  Abdomen: Soft, Non distended, Non tender.  +Bowel sounds, erythema /swelling-lower of abdomen   Extremities: No c/c/e  Psych:   Not anxious or agitated. Neurologic:  No acute neurological deficit.              Labs: Results:       Chemistry Recent Labs      01/17/18   0437  01/16/18   0504  01/15/18   2040   GLU  83  73*  126*   NA  137  136  136   K  4.7  4.9  4.6   CL  108  107  102   CO2  17*  17*  25   BUN  46*  44*  45*   CREA  1.87*  2.14*  2.62*   CA  7.6*  7.8*  8.0*   AGAP  12  12  9   BUCR 25*  21*  17   AP   --    --   65   TP   --    --   5.6*   ALB   --    --   2.0*   GLOB   --    --   3.6   AGRAT   --    --   0.6*      CBC w/Diff Recent Labs      01/16/18   0504  01/15/18   2040   WBC  12.9  14.3*   RBC  5.14  4.99   HGB  15.3  15.2   HCT  46.5*  44.9   PLT  138  164   GRANS   --   57   LYMPH   --   17*   EOS   --   9*      Cardiac Enzymes Recent Labs      01/15/18   2040   CPK  791*   CKND1  0.7      Coagulation Recent Labs      01/16/18   0504  01/15/18   2040   PTP  18.7*  17.2*   INR  1.6*  1.5*   APTT  41.6*   --        Lipid Panel No results found for: CHOL, CHOLPOCT, CHOLX, CHLST, CHOLV, 553866, HDL, LDL, LDLC, DLDLP, 706227, VLDLC, VLDL, TGLX, TRIGL, TRIGP, TGLPOCT, CHHD, CHHDX   BNP No results for input(s): BNPP in the last 72 hours.    Liver Enzymes Recent Labs      01/15/18   2040   TP  5.6*   ALB  2.0*   AP  65   SGOT  43*      Thyroid Studies No results found for: T4, T3U, TSH, TSHEXT, TSHEXT     Procedures/imaging: see electronic medical records for all procedures/Xrays and details which were not copied into this note but were reviewed prior to creation of Clifton Ballard MD

## 2018-01-17 NOTE — PROGRESS NOTES
1920-Assumed Pt care. Pt's skin is red with rash on BUE and the face. Pt reported some itching, Benadryl was already administered. 1955- Lactic acid 2.7, trending down, will recheck in 4 hours. 2349-Lactic acid 2.8. Slight increase from the previous. Pt being treated for sepsis, Dr Rahul Enriquez made aware. Orders received to recheck lactic acid in 4 hours and only inform the MD if there is a significant increase. 2357- Oral temp 101.3, PRN Tylenol administered, extra blankets taken. Will continue to monitor temp  0042- Oral temp recheck, 98.6  0245- Benadryl administered for itching, will also give a bath. 0300- Bath given. Excoriation noted on under the abdominal folds, under the breasts and the groin. Skin breakdown with wounds under  Left breast and right thigh. Pt cleaned gently patted dry with a clean towel, nystatin applied. 0530- Lactic acid 2.6, will recheck in 4 hours.

## 2018-01-17 NOTE — ROUTINE PROCESS
Bedside shift change report given to Jeffry Nugent RN (oncoming nurse) by Sandrita Patrick RN (offgoing nurse). Report included the following information SBAR, Kardex and MAR.

## 2018-01-17 NOTE — PROGRESS NOTES
Pharmacy Dosing Services:   Pharmacist Renal Dosing Progress Note for Levofloxacin   Physician: Corinne Caldera    The following medication: Levofloxacin was automatically dose-adjusted per [facility] P&T Committee Protocol, with respect to renal function. Consult provided for this   81 y.o. , female , for the indication of sepsis/cellulitis/uti. Pt Weight:   Wt Readings from Last 1 Encounters:   01/16/18 77 kg (169 lb 11.2 oz)         Previous Regimen 500 mg IV every 48 hours   Serum Creatinine Lab Results   Component Value Date/Time    Creatinine 1.87 01/17/2018 04:37 AM    Creatinine, POC 2.6 01/15/2018 08:44 PM       Creatinine Clearance Estimated Creatinine Clearance: 26.6 mL/min (based on Cr of 1.87). BUN Lab Results   Component Value Date/Time    BUN 46 01/17/2018 04:37 AM    BUN, POC 42 01/15/2018 08:44 PM       Dosage changed to:  750 mg IV every 48 hours    Additional notes: levofloxacin and fluconazole drug interaction can lead to potential QT interval prolongation. Monitor patient for this potential side effect. Pharmacy to continue to monitor patient daily. Will make dosage adjustments based upon changing renal function.   Signed SANTIAGO SharmaD. Contact information: [phone]

## 2018-01-17 NOTE — ROUTINE PROCESS
Bedside and Verbal shift change report given to Esteban Dejesus RN (oncoming nurse) by Robert Colvin RN (offgoing nurse). Report included the following information SBAR and Kardex.

## 2018-01-17 NOTE — WOUND CARE
Pt seen by wound care due to consult for excoriation to skin folds. Pt already has orders for nystatin powder which nurses are applying. Pt's skin folds, abdominal folds, groins and under breasts cleansed with barrier wipes and dried thoroughly. Nystatin powder applied to all areas followed by Interdry for moisture control. Pt tolerated cleansing of areas without complaints.

## 2018-01-17 NOTE — PROGRESS NOTES
Orders received. Chart reviewed. Pt assessment not completed secondary  to:  []  Nausea/vomiting  []  Eating  []  Pain  [x]  Pt lethargic, pt just had spa declined PT assessment at this time. []  Off Unit  Will f/u tomorrow as schedule allows. Thank you.   Brandon Dean, PT

## 2018-01-17 NOTE — CDMP QUERY
Please clarify if this patient is being treated/managed for:    =>Severe Sepsis with associated Lactic Acidosis in the setting of fever, tachycardia, UTI, abdominal wall cellulitis  =>Other Explanation of clinical findings  =>Unable to Determine (no explanation of clinical findings)    The medical record reflects the following:    Risk:elderly patient    Clinical Indicators:Pt admitted with abdominal pain. Temp=101, pulse =143, WBC=14. 3(on admission), bands=10, Lactic Acid=2.8, 2.9, 3.3    Treatment: Fluid bolus given in ED, levaquin, vanco    Please clarify and document your clinical opinion in the progress notes and discharge summary including the definitive and/or presumptive diagnosis, (suspected or probable), related to the above clinical findings. Please include clinical findings supporting your diagnosis. If you DECLINE this query or would like to communicate with [facility], please utilize the \"Visto message box\" at the TOP of the Progress Note on the right.       Thank you,  Kaylan Glez RN [facility]  [phone]

## 2018-01-18 ENCOUNTER — APPOINTMENT (OUTPATIENT)
Dept: GENERAL RADIOLOGY | Age: 73
DRG: 871 | End: 2018-01-18
Attending: INTERNAL MEDICINE
Payer: MEDICARE

## 2018-01-18 ENCOUNTER — HOME HEALTH ADMISSION (OUTPATIENT)
Dept: HOME HEALTH SERVICES | Facility: HOME HEALTH | Age: 73
End: 2018-01-18
Payer: MEDICARE

## 2018-01-18 ENCOUNTER — APPOINTMENT (OUTPATIENT)
Dept: CT IMAGING | Age: 73
DRG: 871 | End: 2018-01-18
Attending: HOSPITALIST
Payer: MEDICARE

## 2018-01-18 PROBLEM — A41.9 SEVERE SEPSIS (HCC): Status: ACTIVE | Noted: 2018-01-18

## 2018-01-18 PROBLEM — I95.9 HYPOTENSION: Status: ACTIVE | Noted: 2018-01-18

## 2018-01-18 PROBLEM — R65.20 SEVERE SEPSIS (HCC): Status: ACTIVE | Noted: 2018-01-18

## 2018-01-18 PROBLEM — G93.40 ACUTE ENCEPHALOPATHY: Status: ACTIVE | Noted: 2018-01-18

## 2018-01-18 PROBLEM — R21 RASH: Status: ACTIVE | Noted: 2018-01-18

## 2018-01-18 LAB
ALBUMIN SERPL-MCNC: 1.1 G/DL (ref 3.4–5)
ALBUMIN/GLOB SERPL: 0.5 {RATIO} (ref 0.8–1.7)
ALP SERPL-CCNC: 123 U/L (ref 45–117)
ALT SERPL-CCNC: 88 U/L (ref 13–56)
ANION GAP SERPL CALC-SCNC: 11 MMOL/L (ref 3–18)
APTT PPP: 56.9 SEC (ref 23–36.4)
AST SERPL-CCNC: 85 U/L (ref 15–37)
BASOPHILS # BLD: 0.2 K/UL (ref 0–0.1)
BASOPHILS NFR BLD: 1 % (ref 0–3)
BILIRUB DIRECT SERPL-MCNC: 0.2 MG/DL (ref 0–0.2)
BILIRUB SERPL-MCNC: 0.5 MG/DL (ref 0.2–1)
BUN SERPL-MCNC: 34 MG/DL (ref 7–18)
BUN/CREAT SERPL: 25 (ref 12–20)
CA-I SERPL-SCNC: 1.1 MMOL/L (ref 1.12–1.32)
CALCIUM SERPL-MCNC: 7.5 MG/DL (ref 8.5–10.1)
CHLORIDE SERPL-SCNC: 110 MMOL/L (ref 100–108)
CO2 SERPL-SCNC: 18 MMOL/L (ref 21–32)
CREAT SERPL-MCNC: 1.37 MG/DL (ref 0.6–1.3)
D DIMER PPP FEU-MCNC: 4.2 UG/ML(FEU)
DIFFERENTIAL METHOD BLD: ABNORMAL
EOSINOPHIL # BLD: 0.6 K/UL (ref 0–0.4)
EOSINOPHIL NFR BLD: 3 % (ref 0–5)
ERYTHROCYTE [DISTWIDTH] IN BLOOD BY AUTOMATED COUNT: 15.9 % (ref 11.6–14.5)
EST. AVERAGE GLUCOSE BLD GHB EST-MCNC: 128 MG/DL
FIBRINOGEN PPP-MCNC: 277 MG/DL (ref 210–451)
GLOBULIN SER CALC-MCNC: 2.4 G/DL (ref 2–4)
GLUCOSE BLD STRIP.AUTO-MCNC: 102 MG/DL (ref 70–110)
GLUCOSE BLD STRIP.AUTO-MCNC: 124 MG/DL (ref 70–110)
GLUCOSE BLD STRIP.AUTO-MCNC: 138 MG/DL (ref 70–110)
GLUCOSE BLD STRIP.AUTO-MCNC: 231 MG/DL (ref 70–110)
GLUCOSE BLD STRIP.AUTO-MCNC: 58 MG/DL (ref 70–110)
GLUCOSE SERPL-MCNC: 136 MG/DL (ref 74–99)
HBA1C MFR BLD: 6.1 % (ref 4.5–5.6)
HCT VFR BLD AUTO: 44.5 % (ref 35–45)
HGB BLD-MCNC: 14.5 G/DL (ref 12–16)
INR PPP: 2 (ref 0.8–1.2)
LACTATE SERPL-SCNC: 2.8 MMOL/L (ref 0.4–2)
LACTATE SERPL-SCNC: 2.8 MMOL/L (ref 0.4–2)
LACTATE SERPL-SCNC: 3.1 MMOL/L (ref 0.4–2)
LACTATE SERPL-SCNC: 3.5 MMOL/L (ref 0.4–2)
LYMPHOCYTES # BLD: 2.7 K/UL (ref 0.8–3.5)
LYMPHOCYTES NFR BLD: 14 % (ref 20–51)
MAGNESIUM SERPL-MCNC: 2 MG/DL (ref 1.6–2.6)
MCH RBC QN AUTO: 29.8 PG (ref 24–34)
MCHC RBC AUTO-ENTMCNC: 32.6 G/DL (ref 31–37)
MCV RBC AUTO: 91.6 FL (ref 74–97)
MONOCYTES # BLD: 2.7 K/UL (ref 0–1)
MONOCYTES NFR BLD: 14 % (ref 2–9)
NEUTS BAND NFR BLD MANUAL: 20 % (ref 0–5)
NEUTS SEG # BLD: 9.4 K/UL (ref 1.8–8)
NEUTS SEG NFR BLD: 48 % (ref 42–75)
PHOSPHATE SERPL-MCNC: 1.3 MG/DL (ref 2.5–4.9)
PLATELET # BLD AUTO: 196 K/UL (ref 135–420)
PMV BLD AUTO: 10.8 FL (ref 9.2–11.8)
POTASSIUM SERPL-SCNC: 4.7 MMOL/L (ref 3.5–5.5)
PROT SERPL-MCNC: 3.5 G/DL (ref 6.4–8.2)
PROTHROMBIN TIME: 22 SEC (ref 11.5–15.2)
RBC # BLD AUTO: 4.86 M/UL (ref 4.2–5.3)
RBC MORPH BLD: ABNORMAL
SODIUM SERPL-SCNC: 139 MMOL/L (ref 136–145)
TROPONIN I SERPL-MCNC: <0.02 NG/ML (ref 0–0.06)
WBC # BLD AUTO: 19.5 K/UL (ref 4.6–13.2)

## 2018-01-18 PROCEDURE — 74011000250 HC RX REV CODE- 250: Performed by: HOSPITALIST

## 2018-01-18 PROCEDURE — 87086 URINE CULTURE/COLONY COUNT: CPT | Performed by: INTERNAL MEDICINE

## 2018-01-18 PROCEDURE — 80048 BASIC METABOLIC PNL TOTAL CA: CPT | Performed by: INTERNAL MEDICINE

## 2018-01-18 PROCEDURE — 74011000258 HC RX REV CODE- 258: Performed by: HOSPITALIST

## 2018-01-18 PROCEDURE — 85384 FIBRINOGEN ACTIVITY: CPT | Performed by: INTERNAL MEDICINE

## 2018-01-18 PROCEDURE — 65610000006 HC RM INTENSIVE CARE

## 2018-01-18 PROCEDURE — 02HV33Z INSERTION OF INFUSION DEVICE INTO SUPERIOR VENA CAVA, PERCUTANEOUS APPROACH: ICD-10-PCS | Performed by: INTERNAL MEDICINE

## 2018-01-18 PROCEDURE — 97530 THERAPEUTIC ACTIVITIES: CPT

## 2018-01-18 PROCEDURE — 74011000250 HC RX REV CODE- 250

## 2018-01-18 PROCEDURE — 82330 ASSAY OF CALCIUM: CPT | Performed by: INTERNAL MEDICINE

## 2018-01-18 PROCEDURE — 80076 HEPATIC FUNCTION PANEL: CPT | Performed by: INTERNAL MEDICINE

## 2018-01-18 PROCEDURE — 74011000250 HC RX REV CODE- 250: Performed by: INTERNAL MEDICINE

## 2018-01-18 PROCEDURE — 71045 X-RAY EXAM CHEST 1 VIEW: CPT

## 2018-01-18 PROCEDURE — 87040 BLOOD CULTURE FOR BACTERIA: CPT | Performed by: INTERNAL MEDICINE

## 2018-01-18 PROCEDURE — 85379 FIBRIN DEGRADATION QUANT: CPT | Performed by: INTERNAL MEDICINE

## 2018-01-18 PROCEDURE — 83605 ASSAY OF LACTIC ACID: CPT | Performed by: HOSPITALIST

## 2018-01-18 PROCEDURE — 74011250636 HC RX REV CODE- 250/636: Performed by: HOSPITALIST

## 2018-01-18 PROCEDURE — 74011000258 HC RX REV CODE- 258: Performed by: INTERNAL MEDICINE

## 2018-01-18 PROCEDURE — 74011250637 HC RX REV CODE- 250/637: Performed by: HOSPITALIST

## 2018-01-18 PROCEDURE — 74176 CT ABD & PELVIS W/O CONTRAST: CPT

## 2018-01-18 PROCEDURE — 85730 THROMBOPLASTIN TIME PARTIAL: CPT | Performed by: INTERNAL MEDICINE

## 2018-01-18 PROCEDURE — 97162 PT EVAL MOD COMPLEX 30 MIN: CPT

## 2018-01-18 PROCEDURE — 84100 ASSAY OF PHOSPHORUS: CPT | Performed by: INTERNAL MEDICINE

## 2018-01-18 PROCEDURE — 85610 PROTHROMBIN TIME: CPT | Performed by: INTERNAL MEDICINE

## 2018-01-18 PROCEDURE — 36415 COLL VENOUS BLD VENIPUNCTURE: CPT | Performed by: INTERNAL MEDICINE

## 2018-01-18 PROCEDURE — 85025 COMPLETE CBC W/AUTO DIFF WBC: CPT | Performed by: HOSPITALIST

## 2018-01-18 PROCEDURE — 74011250637 HC RX REV CODE- 250/637: Performed by: INTERNAL MEDICINE

## 2018-01-18 PROCEDURE — C8929 TTE W OR WO FOL WCON,DOPPLER: HCPCS

## 2018-01-18 PROCEDURE — 97165 OT EVAL LOW COMPLEX 30 MIN: CPT

## 2018-01-18 PROCEDURE — 74011250636 HC RX REV CODE- 250/636: Performed by: INTERNAL MEDICINE

## 2018-01-18 PROCEDURE — 83036 HEMOGLOBIN GLYCOSYLATED A1C: CPT | Performed by: INTERNAL MEDICINE

## 2018-01-18 PROCEDURE — 83735 ASSAY OF MAGNESIUM: CPT | Performed by: INTERNAL MEDICINE

## 2018-01-18 PROCEDURE — 82962 GLUCOSE BLOOD TEST: CPT

## 2018-01-18 PROCEDURE — 84484 ASSAY OF TROPONIN QUANT: CPT | Performed by: INTERNAL MEDICINE

## 2018-01-18 RX ORDER — MAGNESIUM SULFATE 100 %
4 CRYSTALS MISCELLANEOUS AS NEEDED
Status: DISCONTINUED | OUTPATIENT
Start: 2018-01-18 | End: 2018-01-26 | Stop reason: HOSPADM

## 2018-01-18 RX ORDER — DEXTROSE 50 % IN WATER (D50W) INTRAVENOUS SYRINGE
25-50 AS NEEDED
Status: DISCONTINUED | OUTPATIENT
Start: 2018-01-18 | End: 2018-01-26 | Stop reason: HOSPADM

## 2018-01-18 RX ORDER — INSULIN LISPRO 100 [IU]/ML
INJECTION, SOLUTION INTRAVENOUS; SUBCUTANEOUS
Status: DISCONTINUED | OUTPATIENT
Start: 2018-01-18 | End: 2018-01-26 | Stop reason: HOSPADM

## 2018-01-18 RX ORDER — LIDOCAINE HYDROCHLORIDE 10 MG/ML
INJECTION INFILTRATION; PERINEURAL
Status: COMPLETED
Start: 2018-01-18 | End: 2018-01-18

## 2018-01-18 RX ORDER — DEXTROSE 50 % IN WATER (D50W) INTRAVENOUS SYRINGE
25 AS NEEDED
Status: DISCONTINUED | OUTPATIENT
Start: 2018-01-18 | End: 2018-01-26 | Stop reason: HOSPADM

## 2018-01-18 RX ORDER — SODIUM CHLORIDE 9 MG/ML
125 INJECTION, SOLUTION INTRAVENOUS CONTINUOUS
Status: DISCONTINUED | OUTPATIENT
Start: 2018-01-18 | End: 2018-01-19

## 2018-01-18 RX ORDER — DEXTROSE MONOHYDRATE AND SODIUM CHLORIDE 5; .45 G/100ML; G/100ML
125 INJECTION, SOLUTION INTRAVENOUS CONTINUOUS
Status: DISCONTINUED | OUTPATIENT
Start: 2018-01-18 | End: 2018-01-18

## 2018-01-18 RX ORDER — METRONIDAZOLE 500 MG/100ML
500 INJECTION, SOLUTION INTRAVENOUS EVERY 8 HOURS
Status: DISCONTINUED | OUTPATIENT
Start: 2018-01-18 | End: 2018-01-18

## 2018-01-18 RX ORDER — SODIUM BICARBONATE 650 MG/1
325 TABLET ORAL 2 TIMES DAILY
Status: DISCONTINUED | OUTPATIENT
Start: 2018-01-18 | End: 2018-01-24

## 2018-01-18 RX ORDER — DEXTROSE 50 % IN WATER (D50W) INTRAVENOUS SYRINGE
Status: COMPLETED
Start: 2018-01-18 | End: 2018-01-18

## 2018-01-18 RX ORDER — FAMOTIDINE 20 MG/1
20 TABLET, FILM COATED ORAL DAILY
Status: DISCONTINUED | OUTPATIENT
Start: 2018-01-19 | End: 2018-01-21

## 2018-01-18 RX ADMIN — LOSARTAN POTASSIUM 100 MG: 50 TABLET ORAL at 08:31

## 2018-01-18 RX ADMIN — SUCRALFATE 1 G: 1 TABLET ORAL at 21:36

## 2018-01-18 RX ADMIN — NYSTATIN: 100000 POWDER TOPICAL at 21:11

## 2018-01-18 RX ADMIN — METRONIDAZOLE 500 MG: 500 INJECTION, SOLUTION INTRAVENOUS at 12:01

## 2018-01-18 RX ADMIN — DIPHENHYDRAMINE HYDROCHLORIDE 25 MG: 50 INJECTION INTRAMUSCULAR; INTRAVENOUS at 18:23

## 2018-01-18 RX ADMIN — HEPARIN SODIUM 5000 UNITS: 5000 INJECTION, SOLUTION INTRAVENOUS; SUBCUTANEOUS at 01:47

## 2018-01-18 RX ADMIN — SODIUM CHLORIDE 125 ML/HR: 900 INJECTION, SOLUTION INTRAVENOUS at 00:50

## 2018-01-18 RX ADMIN — SODIUM CHLORIDE 500 ML: 900 INJECTION, SOLUTION INTRAVENOUS at 12:00

## 2018-01-18 RX ADMIN — FAMOTIDINE 20 MG: 20 TABLET ORAL at 08:31

## 2018-01-18 RX ADMIN — SODIUM PHOSPHATE, MONOBASIC, MONOHYDRATE AND SODIUM PHOSPHATE, DIBASIC ANHYDROUS 12 MMOL: 276; 142 INJECTION, SOLUTION INTRAVENOUS at 21:08

## 2018-01-18 RX ADMIN — PHYTONADIONE 5 MG: 10 INJECTION, EMULSION INTRAMUSCULAR; INTRAVENOUS; SUBCUTANEOUS at 18:22

## 2018-01-18 RX ADMIN — SODIUM BICARBONATE 650 MG TABLET 325 MG: at 21:37

## 2018-01-18 RX ADMIN — DEXTROSE MONOHYDRATE AND SODIUM CHLORIDE 125 ML/HR: 5; .45 INJECTION, SOLUTION INTRAVENOUS at 01:15

## 2018-01-18 RX ADMIN — FLUCONAZOLE 200 MG: 100 TABLET ORAL at 08:30

## 2018-01-18 RX ADMIN — DIPHENHYDRAMINE HYDROCHLORIDE, ZINC ACETATE: 2; .1 CREAM TOPICAL at 11:39

## 2018-01-18 RX ADMIN — VANCOMYCIN HYDROCHLORIDE 750 MG: 10 INJECTION, POWDER, LYOPHILIZED, FOR SOLUTION INTRAVENOUS at 18:22

## 2018-01-18 RX ADMIN — HEPARIN SODIUM 5000 UNITS: 5000 INJECTION, SOLUTION INTRAVENOUS; SUBCUTANEOUS at 08:31

## 2018-01-18 RX ADMIN — NYSTATIN: 100000 POWDER TOPICAL at 08:32

## 2018-01-18 RX ADMIN — SUCRALFATE 1 G: 1 TABLET ORAL at 18:25

## 2018-01-18 RX ADMIN — METHYLPREDNISOLONE SODIUM SUCCINATE 40 MG: 40 INJECTION, POWDER, FOR SOLUTION INTRAMUSCULAR; INTRAVENOUS at 18:25

## 2018-01-18 RX ADMIN — ACETAMINOPHEN 650 MG: 325 TABLET ORAL at 07:26

## 2018-01-18 RX ADMIN — DEXTROSE MONOHYDRATE 25 G: 25 INJECTION, SOLUTION INTRAVENOUS at 00:56

## 2018-01-18 RX ADMIN — SODIUM CHLORIDE 1000 ML: 900 INJECTION, SOLUTION INTRAVENOUS at 12:46

## 2018-01-18 RX ADMIN — SODIUM CHLORIDE 1000 ML: 900 INJECTION, SOLUTION INTRAVENOUS at 18:21

## 2018-01-18 RX ADMIN — DOXYCYCLINE 100 MG: 100 CAPSULE ORAL at 08:56

## 2018-01-18 RX ADMIN — SODIUM BICARBONATE 650 MG TABLET 325 MG: at 08:31

## 2018-01-18 RX ADMIN — SUCRALFATE 1 G: 1 TABLET ORAL at 12:01

## 2018-01-18 RX ADMIN — NORTRIPTYLINE HYDROCHLORIDE 25 MG: 25 CAPSULE ORAL at 21:36

## 2018-01-18 RX ADMIN — AZTREONAM 1 G: 1 INJECTION, POWDER, LYOPHILIZED, FOR SOLUTION INTRAMUSCULAR; INTRAVENOUS at 18:25

## 2018-01-18 RX ADMIN — CALCIUM GLUCONATE 2 G: 94 INJECTION, SOLUTION INTRAVENOUS at 21:08

## 2018-01-18 RX ADMIN — ASPIRIN 325 MG ORAL TABLET 325 MG: 325 PILL ORAL at 08:31

## 2018-01-18 RX ADMIN — LIDOCAINE HYDROCHLORIDE: 10 INJECTION, SOLUTION INFILTRATION; PERINEURAL at 18:25

## 2018-01-18 RX ADMIN — SODIUM CHLORIDE 125 ML/HR: 900 INJECTION, SOLUTION INTRAVENOUS at 14:53

## 2018-01-18 RX ADMIN — SUCRALFATE 1 G: 1 TABLET ORAL at 08:31

## 2018-01-18 NOTE — PROGRESS NOTES
Pharmacy Dosing Services:   Consult for Vancomycin Dosing by Pharmacy by Dr. Berry David  Indication: Sepsis  Day of Therapy: 4  Previous Regimen 1000 mg IV every 36 hours   Other Current Antibiotics Doxycycline, Levofloxacin   Serum Creatinine Lab Results   Component Value Date/Time    Creatinine, POC 2.6 01/15/2018 08:44 PM    Creatinine 1.37 01/18/2018 02:55 AM      Creatinine Clearance Estimated Creatinine Clearance: 36.6 mL/min (based on Cr of 1.37).    BUN Lab Results   Component Value Date/Time    BUN 34 01/18/2018 02:55 AM    BUN, POC 42 01/15/2018 08:44 PM       WBC Lab Results   Component Value Date/Time    WBC 19.5 01/18/2018 02:55 AM      H/H    Lab Results   Component Value Date/Time    HGB 14.5 01/18/2018 02:55 AM      Platelets Lab Results   Component Value Date/Time    PLATELET 584 72/89/9617 02:55 AM      Temp 97.2 °F (36.2 °C)     Dose administration notes: Last 1000 mg dose given at 1515 on 1/17/18  Adjustments: Increase dose to 750 mg IV every 24 hours, first dose today at 1500, due to improved renal function and continued signs and symptoms    Plan:  Continue to monitor

## 2018-01-18 NOTE — DIABETES MGMT
GLYCEMIC CONTROL PROGRESS NOTE:    -no known h/o DM, c/o abdominal pain  Noted:  -pt with 2 mild hypoglycemic episodes on POCT testing yesterday 1/17/18  -pt with mild hypoglycemic episode on POCT this AM, hypo protocol followed  -hypoglycemic probably r/t poor po intake  -D5 IVF started today    - Intake:   Patient Vitals for the past 100 hrs:   % Diet Eaten   01/18/18 0730 0 %   01/17/18 2213 0 %   01/17/18 1915 0 %   01/17/18 0645 0 %   01/16/18 1433 100 %      Grant Arredondo RN, MS  Glycemic Control Team

## 2018-01-18 NOTE — PROGRESS NOTES
Entered chart for the purpose of entering a critical lab result phoned from the lab. Primary nurse also made aware of critical result.

## 2018-01-18 NOTE — PROGRESS NOTES
TRANSFER - OUT REPORT:    Verbal report given to Kellee Pena RN(name) on Mercedes Rosario  being transferred to ICU(unit) for change in patient condition         Report consisted of patients Situation, Background, Assessment and   Recommendations(SBAR). Information from the following report(s) SBAR, Kardex, ED Summary, Procedure Summary, Intake/Output, MAR, Accordion, Recent Results and Med Rec Status was reviewed with the receiving nurse. Lines:   Peripheral IV 01/16/18 Right Hand (Active)   Site Assessment Clean, dry, & intact 1/18/2018 11:15 AM   Phlebitis Assessment 0 1/18/2018 11:15 AM   Infiltration Assessment 0 1/18/2018 11:15 AM   Dressing Status Clean, dry, & intact 1/18/2018 11:15 AM   Dressing Type Tape;Transparent 1/18/2018 11:15 AM   Hub Color/Line Status Blue;Flushed; Infusing 1/18/2018 11:15 AM   Action Taken Open ports on tubing capped 1/18/2018 11:15 AM   Alcohol Cap Used Yes 1/18/2018 11:15 AM        Opportunity for questions and clarification was provided.       Patient transported with:   Monitor  Registered Nurse

## 2018-01-18 NOTE — ROUTINE PROCESS
TRANSFER - IN REPORT:    Verbal report received from Beth Tee RN(name) on Olegario Summers  being received from [address] [place] [address](unit) for change in patient condition(hypotension)      Report consisted of patients Situation, Background, Assessment and   Recommendations(SBAR). Information from the following report(s) SBAR, Kardex, Intake/Output and Recent Results was reviewed with the receiving nurse. Opportunity for questions and clarification was provided. Assessment completed upon patients arrival to unit and care assumed. Assumed care of pt at this time, pt aox4, following commands, denies pain, appears red, SBP >90 at this time, up with assist to bedside commode. 0  Dr. Jeri Menjivar at bedside to place central line, family at bedside, consent done. 1600  Central line complete, family back at bedside, pt tolerated well. 1800  Dr. Jeri Menjivar at bedside, orders received. 1912  Bedside, Verbal and Written shift change report given to Lisa Pierson (oncoming nurse) by JARED Springer (offgoing nurse). Report included the following information SBAR, Kardex, Intake/Output and Recent Results.

## 2018-01-18 NOTE — PROGRESS NOTES
Problem: Mobility Impaired (Adult and Pediatric)  Goal: *Acute Goals and Plan of Care (Insert Text)  Physical Therapy Goals  Initiated 1/18/2018 and to be accomplished within 3-7 day(s)  1. Patient will move from supine to sit and sit to supine  in bed with supervision/set-up. 2.  Patient will transfer from bed to chair and chair to bed with supervision/set-up using the least restrictive device. 3.  Patient will perform sit to stand with supervision/set-up. 4.  Patient will ambulate with supervision/set-up for 150 feet with the least restrictive device. 5.  Patient will ascend/descend 1 stairs without handrail(s) with minimal assistance/contact guard assist.  Outcome: Progressing Towards Goal  physical Therapy EVALUATION    Patient: Angel Mcgowan (39 y.o. female)  Date: 1/18/2018  Primary Diagnosis: Sepsis ([address]  Cellulitis of abdominal wall  Cellulitis of groin  Renal insufficiency  UTI (urinary tract infection)  SIRS (systemic inflammatory response syndrome) (HCC)  UTI (urinary tract infection)  Abdominal wall cellulitis  Precautions:   Fall    ASSESSMENT :  Based on the objective data described below, the patient presents with lower extremity weakness, impaired bed mobility and transfers, and overall limitations in functional mobility. Pt initially reporting abdominal pain and refuses to participate with PT. Will encouragement, pt eventually wiling to attempt mobility. Pt performed supine to sit with ModA, sit to stand with ModA. Pt unable to sustain upright standing position. Pt repeatedly leaning on forearms on walker. Unable/unsafe to take steps at this time. Pt continually reports \"I feel very weak. \" Pt tolerated session fairly as evidenced by no c/o increased pain, no lightheadedness or dizziness, fatigued noted with only minimal activity. Patient would benefit from skilled inpatient physical therapy to address deficits, progress as tolerated to achieve long term goals and allow safe discharge. Pt may require rehab placement upon d/c for strengthening and endurance training; will continue to assess based on pt progress. Patient will benefit from skilled intervention to address the above impairments. Patients rehabilitation potential is considered to be Good  Factors which may influence rehabilitation potential include:   []         None noted  []         Mental ability/status  [x]         Medical condition  [x]         Home/family situation and support systems  [x]         Safety awareness  [x]         Pain tolerance/management  []         Other:      PLAN :  Recommendations and Planned Interventions:  [x]           Bed Mobility Training             [x]    Neuromuscular Re-Education  [x]           Transfer Training                   []    Orthotic/Prosthetic Training  [x]           Gait Training                          []    Modalities  [x]           Therapeutic Exercises          []    Edema Management/Control  [x]           Therapeutic Activities            [x]    Patient and Family Training/Education  []           Other (comment):    Frequency/Duration: Patient will be followed by physical therapy 1-2 times per day to address goals. Discharge Recommendations: Rehab  Further Equipment Recommendations for Discharge: rolling walker     SUBJECTIVE:   Patient stated I am very weak, I don't want to do therapy today.     OBJECTIVE DATA SUMMARY:     Past Medical History:   Diagnosis Date    Arthritis     Blood clot in vein 2004    left leg     [facility]     Cancer ([facility]     right breast    GERD (gastroesophageal reflux disease)     High cholesterol     Hypertension     Other ill-defined conditions(799.89)     high cholestrol    Vertigo      Past Surgical History:   Procedure Laterality Date    ABDOMEN SURGERY PROC UNLISTED      removal of tumor abdomen    HX BREAST LUMPECTOMY      HX GI      colonosocpy    HX KNEE REPLACEMENT      HX ORTHOPAEDIC      total knee replacement bilateral    HX ORTHOPAEDIC      excision of cyst left hand    HX TUBAL LIGATION      HX TUMOR REMOVAL       Barriers to Learning/Limitations: yes;  altered mental status (i.e.Sedation, Confusion)  Compensate with: Visual Cues, Verbal Cues and Tactile Cues  Prior Level of Function/Home Situation: Independent amb c/SPC  Home Situation  Home Environment: Private residence  # Steps to Enter: 1  One/Two Story Residence: One story  Living Alone: No  Support Systems: Spouse/Significant Other/Partner, Child(lefty)  Patient Expects to be Discharged to[de-identified] Private residence  Current DME Used/Available at Home: Cane, straight  Tub or Shower Type: Tub/Shower combination  Critical Behavior:  Neurologic State: Alert  Orientation Level: Oriented to person;Oriented to place;Oriented to time;Disoriented to situation  Cognition: Follows commands  Psychosocial  Patient Behaviors: Calm; Cooperative  Needs Expressed: Educational  Purposeful Interaction: Yes  Pt Identified Daily Priority: Clinical issues (comment); Communication issues (comment)  Caritas Process: Nurture loving kindness;Establish trust;Attend basic human needs;Create healing environment  Caring Interventions: Reassure; Therapeutic modalities  Reassure: Therapeutic listening;Quiet presence;Caring rounds  Therapeutic Modalities: Humor; Intentional therapeutic touch  Skin Condition/Temp: Rash  Skin Integrity: Excoriation  Skin Integumentary  Skin Color: Appropriate for ethnicity  Skin Condition/Temp: Rash  Skin Integrity: Excoriation  Turgor: Non-tenting  Hair Growth: Present  Varicosities: Absent  Strength:    Strength: Generally decreased, functional  Tone & Sensation:   Tone: Normal  Range Of Motion:  AROM: Generally decreased, functional  Functional Mobility:  Bed Mobility:  Rolling: Minimum assistance  Supine to Sit: Moderate assistance; Additional time  Sit to Supine: Moderate assistance; Additional time  Transfers:  Sit to Stand: Minimum assistance;Assist x2  Stand to Sit: Minimum assistance  Balance:   Sitting: Intact  Standing: Impaired  Standing - Static: Poor  Standing - Dynamic : Poor  Pain:  Pain Scale 1: Numeric (0 - 10)  Pain Intensity 1: 0  Activity Tolerance:   Good  Please refer to the flowsheet for vital signs taken during this treatment. After treatment:   []         Patient left in no apparent distress sitting up in chair  [x]         Patient left in no apparent distress in bed  [x]         Call bell left within reach  [x]         Nursing notified  []         Caregiver present  []         Bed alarm activated    COMMUNICATION/EDUCATION:   [x]         Fall prevention education was provided and the patient/caregiver indicated understanding. [x]         Patient/family have participated as able in goal setting and plan of care. [x]         Patient/family agree to work toward stated goals and plan of care. []         Patient understands intent and goals of therapy, but is neutral about his/her participation. []         Patient is unable to participate in goal setting and plan of care. Thank you for this referral.  Brenton Bowmancomb   Time Calculation: 23 mins   Eval Complexity: History: MEDIUM  Complexity : 1-2 comorbidities / personal factors will impact the outcome/ POC Exam:MEDIUM Complexity : 3 Standardized tests and measures addressing body structure, function, activity limitation and / or participation in recreation  Presentation: MEDIUM Complexity : Evolving with changing characteristics  Clinical Decision Making:Medium Complexity amb <30 ft, required ModA for bed mobility and transfers Overall Complexity:MEDIUM   Mobility  Current  CJ= 20-39%   Goal  CI= 1-19%. The severity rating is based on the Level of Assistance required for Functional Mobility and ADLs.

## 2018-01-18 NOTE — DIABETES MGMT
GLYCEMIC CONTROL PROGRESS NOTE:    -discussed in rounds, no known h/o DM  -pt with hypoglycemia r/t poor appetite, pt ate very little of breakfast per RN  -registered dietician consulted--ordered ensure supplement with meals  -D5. 45 infusing    Recent Glucose Results:   Lab Results   Component Value Date/Time     (H) 01/18/2018 02:55 AM    GLUCPOC 124 (H) 01/18/2018 06:33 AM    GLUCPOC 231 (H) 01/18/2018 01:06 AM    GLUCPOC 58 (L) 01/18/2018 12:47 AM     Cesia Kiser RN, MS  Glycemic Control Team

## 2018-01-18 NOTE — PROGRESS NOTES
1915: Assumed patient care, she was alert and oriented to person, place, but disoriented to time and situation. Respiratory status was stable on room air. Vital signs were stable. MEWS score was a three. Patient denied any nausea vomiting dizziness or anxiety. White board was updated and explained. Bed was locked and in lowest position. Call bell, water and personal belongings were within reach. Patient had no questions, comments or concerns after bedside shift report. 2215: Melvin Fagan from the lab called with a critical lactic acid of 2.4 This nurse called Dr. Jenelle Langford at 22:22 and updated him on the patient's status. Dr. Jenelle Langford wanted this nurse to cancel the every four hour lactic acid lab draw. This nurse could not cancel the order because protocol calls for the lactic acid to be under 2.0  This nurse will not notify Dr. Jenelle Langford of any new lactic acid results as long as the results are trending down. No other results were received at that time. 7746: This nurse had the PCT check the patient's blood glucose due to a previous hypoglycemic episode. The patient's blood glucose was 58. She was unwilling to eat or drink anything, so an amp of D50 was given. The blood glucose was rechecked in fifteen minutes, then the hospitalist was called. This nurse updated Dr. Jenelle Langford on the patient's status. An order was received to change the patient's continuous fluids from normal saline to D5 1/2 normal saline at 125 mL/hr. Orders were read back and verified. 3427: Lab called with a critical result, the patient's lactic acid was 2.8   was called and updated on the patient's condition. No new orders were received. 0700: Patient had an uneventful shift. Respiratory status, vital signs and MEWS score remained stable. Patient was resting quietly with no signs of distress noted. Bed locked and in lowest position. Call bell water and personal belongings were within reach.  Patient had no questions, comments or concerns after bedside shift report.  Bedside report given to [address].

## 2018-01-18 NOTE — PROCEDURES
[address] [place] [address]  [address] [address]  Phone [phone]   Fax [phone]    Pulmonary Critical Care & Sleep Medicine           Central Line Procedure Note      Indication: Inadequate venous access    Risks, benefits, alternatives explained and consent obtained. All risks including pneumothorax bleeding and respiratory failure explained. All questions answered. Central line Bundle:    Full sterile barrier precautions used. 7-Step Sterility Protocol followed. (cap, mask sterile gown, sterile gloves, large sterile sheet, hand hygiene, 2% chlorhexidine for cutaneous antisepsis)  5 mL 1% Lidocaine placed at insertion site. Femoral  line Right cannulated x 3-4  attempt(s) utilizing the modified Seldinger technique. moderate  Good blood return. Catheter secured & Biopatch applied. Sterile Tegaderm placed. Patient tolerated procedure well. No complication noted.         Joyce Mcdonald MD   Pulmonary Critical Care & Sleep Medicine   5:03 PM

## 2018-01-18 NOTE — PROGRESS NOTES
Problem: Falls - Risk of  Goal: *Absence of Falls  Document Santiago Fall Risk and appropriate interventions in the flowsheet.    Outcome: Progressing Towards Goal  Fall Risk Interventions:  Mobility Interventions: Assess mobility with egress test, Bed/chair exit alarm, Patient to call before getting OOB, Utilize walker, cane, or other assitive device         Medication Interventions: Bed/chair exit alarm, Patient to call before getting OOB    Elimination Interventions: Bed/chair exit alarm, Call light in reach, Patient to call for help with toileting needs, Toileting schedule/hourly rounds

## 2018-01-18 NOTE — PROGRESS NOTES
0730 Assumed care of pt from [facility] 76.. Pt resting quietly in bed , no signs of distress, call bell within reach. [address] [place] with , order to recheck lactic acid a couple of hours after sodium bicarb is given. Pt is complaining of itchiness on abdomen and legs, pt is confused do not want to give her PO Benadryl. Per Dr Edie corral to order topical Benadryl to be applied to legs and abdomen  -pt is extremely weak, attempted to sit at side of bed but was quickly transitioned back to laying position, did not tolerate well. Short of breath but refused O2 via NC    1129 Manual bp 82/62 order for 500 cc IV bolus    1235 Manual bp 80/58, another order for 500 cc IV bolus   -order to transfer to ICU     Yoselin Salcido [phone] (daughter)  NILO [phone] (daughter)    Shift Summary- Shift uneventful. Pt transferred to ICU after receiving her 1L IV Bolus, no notes of pain was able to transfer to bedside commode.

## 2018-01-18 NOTE — PROGRESS NOTES
Hospitalist Progress Note-critical care note     Patient: Anton Vela MRN: [id number]  CSN: [id number]    YOB: 1945  Age: 81 y.o. Sex: female    DOA: 1/15/2018 LOS:  LOS: 3 days            Chief complaint:sepsis , sandra on ckds , cellulitis , proctitis , hypotension, severe sepsis , acute encephalopathy     Assessment/Plan         Hospital Problems  Date Reviewed: 1/15/2018          Codes Class Noted POA    Acute encephalopathy ICD-10-CM: G93.40  ICD-9-CM: 348.30  1/18/2018 Unknown        Rash ICD-10-CM: R21  ICD-9-CM: 782.1  1/18/2018 Unknown        Proctitis ICD-10-CM: K62.89  ICD-9-CM: 569.49  1/17/2018 Unknown        * (Principal)Cellulitis of abdominal wall ICD-10-CM: L03.311  ICD-9-CM: 682.2  1/15/2018 Unknown        Cellulitis of groin ICD-10-CM: L03.314  ICD-9-CM: 682.2  1/15/2018 Unknown        UTI (urinary tract infection) ICD-10-CM: N39.0  ICD-9-CM: 599.0  1/15/2018 Unknown        Renal insufficiency ICD-10-CM: N28.9  ICD-9-CM: 593.9  1/15/2018 Unknown        Sepsis ([address] ICD-10-CM: A41.9  ICD-9-CM: 038.9, 995.91  1/15/2018 Unknown        GERD (gastroesophageal reflux disease) ICD-10-CM: K21.9  ICD-9-CM: 530.81  1/15/2018 Unknown        HLD (hyperlipidemia) ICD-10-CM: E78.5  ICD-9-CM: 272.4  1/15/2018 Unknown        Moderate dehydration ICD-10-CM: E86.0  ICD-9-CM: 276.51  1/15/2018 Unknown        Diastolic CHF, chronic (HCC) ICD-10-CM: I50.32  ICD-9-CM: 428.32, 428.0  1/15/2018 Unknown        SIRS (systemic inflammatory response syndrome) (HCC) ICD-10-CM: R65.10  ICD-9-CM: 995.90  1/15/2018 Unknown        Abdominal wall cellulitis ICD-10-CM: L03.311  ICD-9-CM: 682.2  1/15/2018 Unknown        HTN (hypertension) ICD-10-CM: I10  ICD-9-CM: 401.9  11/4/2013 Yes            1.  Severe Sepsis with associated Lactic Acidosis in the setting of fever, tachycardia, UTI, abdominal wall cellulitis  Worsening with leukocytosis   Add flagyl and will repeat ct abdomen and check echo   Lactic acid still high-bicarb given    2. Hypotension   bp still low after bolus   Change to ns infusion   D.c losartan     2. Abdominal Wall cellulitis- bacterial and/or fungal   Continue abx and antifungal,   Wound care   Ct abdomen no abscess , indicated improved proctitis  Will repeat it due to pt clinically worsening     3. UTI  Continue abx and f/u with cx   4. Acute Kidney Injury on ckd3  Stable. will continue hydration and renal dose drug     5. Moderate Dehydration     6. Diastolic CHF, chronic, compensated   7. HTN  Continue home meds   8. GERD  ppi   9. HLD  Statin     11. Proctitis-poa   On  doxy,   12 acute encephalopathy   A little bit confused, due to infection     13 rash (poa)  Worsening during her stay, topic benadryl, no derm and no id service in this hospital        Subjective: feel tired   Nurse: hypotension and lactic acidosis     Pt presented hypoglycemia due to not eating, still elevated lactic acidosis, wbc up to  19. Then presented hypotension-confirmed with manual bp check, iv ns bolus given. Due to severe sepsis , hypotension and clinically worsening-worry about septic shock , transfer pt to icu. Case discussed with dr. Navya Coy. Discussed with  Cira Watts per calling [phone]. He was updated. He reported her daughter phone number : [phone]   45 minutes of critical care time spent in the direct evaluation and treatment of this high risk patient. The reason for providing this level of medical care for this critically ill patient was due a critical illness that impaired one or more vital organ systems such that there was a high probability of imminent or life threatening deterioration in the patients condition. This care involved high complexity decision making to assess, manipulate, and support vital system functions, to treat this degreee vital organ system failure and to prevent further life threatening deterioration of the patients condition.       Review of systems:    General: No fevers or chills. Cardiovascular: No chest pain or pressure. No palpitations. Pulmonary: No shortness of breath. Gastrointestinal: No nausea, vomiting. Abdomen pain   Derm; itchiness   Vital signs/Intake and Output:  Visit Vitals    /62    Pulse 98    Temp 97.2 °F (36.2 °C)    Resp 24    Ht 4' 11\" (1.499 m)    Wt 78.4 kg (172 lb 13.5 oz)    SpO2 99%    BMI 34.91 kg/m2     Current Shift:  01/18 0701 - 01/18 1900  In: 468.8 [I.V.:468.8]  Out: -   Last three shifts:  01/16 1901 - 01/18 0700  In: 3846.7 [P.O.:960; I.V.:2886.7]  Out: 2275 [Urine:2275]    Physical Exam:  General: WD, WN. Alert, cooperative, no acute distress    HEENT: NC, Atraumatic. PERRLA, anicteric sclerae. Lungs: CTA Bilaterally. No Wheezing/Rhonchi/Rales. Heart:  Tachycardia ,  No murmur, No Rubs, No Gallops  Abdomen: Soft, Non distended, Non tender.  +Bowel sounds, erythema /swelling-lower of abdomen   Extremities: No c/c/e  Psych:   Not anxious or agitated. Neurologic:  No acute neurological deficit.      Derm : dry skin and rash on face and arm         Labs: Results:       Chemistry Recent Labs      01/18/18   1320  01/18/18   0255  01/17/18   0437  01/16/18   0504  01/15/18   2040   GLU   --   136*  83  73*  126*   NA   --   139  137  136  136   K   --   4.7  4.7  4.9  4.6   CL   --   110*  108  107  102   CO2   --   18*  17*  17*  25   BUN   --   34*  46*  44*  45*   CREA   --   1.37*  1.87*  2.14*  2.62*   CA   --   7.5*  7.6*  7.8*  8.0*   AGAP   --   11  12  12  9   BUCR   --   25*  25*  21*  17   AP  123*   --    --    --   65   TP  3.5*   --    --    --   5.6*   ALB  1.1*   --    --    --   2.0*   GLOB  2.4   --    --    --   3.6   AGRAT  0.5*   --    --    --   0.6*      CBC w/Diff Recent Labs      01/18/18   0255  01/16/18   0504  01/15/18   2040   WBC  19.5*  12.9  14.3*   RBC  4.86  5.14  4.99   HGB  14.5  15.3  15.2   HCT  44.5  46.5*  44.9   PLT  196  138  164   GRANS  48   --   57 LYMPH  14*   --   17*   EOS  3   --   9*      Cardiac Enzymes Recent Labs      01/15/18   2040   CPK  791*   CKND1  0.7      Coagulation Recent Labs      01/18/18   1320  01/16/18   0504   PTP  22.0*  18.7*   INR  2.0*  1.6*   APTT  56.9*  41.6*       Lipid Panel No results found for: CHOL, CHOLPOCT, CHOLX, CHLST, CHOLV, 014170, HDL, LDL, LDLC, DLDLP, 879772, VLDLC, VLDL, TGLX, TRIGL, TRIGP, TGLPOCT, CHHD, CHHDX   BNP No results for input(s): BNPP in the last 72 hours.    Liver Enzymes Recent Labs      01/18/18   1320   TP  3.5*   ALB  1.1*   AP  123*   SGOT  85*      Thyroid Studies No results found for: T4, T3U, TSH, TSHEXT, TSHEXT     Procedures/imaging: see electronic medical records for all procedures/Xrays and details which were not copied into this note but were reviewed prior to creation of Eamon Cabello MD

## 2018-01-18 NOTE — PROGRESS NOTES
Problem: Self Care Deficits Care Plan (Adult)  Goal: *Acute Goals and Plan of Care (Insert Text)  Occupational Therapy Goals  Initiated 1/18/2018 within 7 day(s). 1.  Patient will perform lower body dressing with  moderate assistance  while sitting on EOB  2. Patient will perform grooming with supervision/set-up while standing at sink. 3.  Patient will perform toilet transfers with  minimal assistance/contact guard assist.  4.  Patient will perform all aspects of toileting with  minimal assistance/contact guard assist.  5.  Patient will perform upper extremity therapeutic exercise/activities with  supervision/set-up for 15 minutes. 6.  Patient will utilize energy conservation techniques during functional activities with verbal cue(s). Occupational Therapy EVALUATION    Patient: Lily Oconnor (80 y.o. female)  Date: 1/18/2018  Primary Diagnosis: Sepsis ([address]  Cellulitis of abdominal wall  Cellulitis of groin  Renal insufficiency  UTI (urinary tract infection)  SIRS (systemic inflammatory response syndrome) (HCC)  UTI (urinary tract infection)  Abdominal wall cellulitis        Precautions: fall       ASSESSMENT :  Based on the objective data described below, the patient presents with decreased independence with ADLs and functional mobility limited by decreased activity tolerance, generalized weakness and some confusion at this time. Pt seen with PT to maximize outcomes. Pt requiring minimal encouragement for participation initially. Mod A to transition to sitting EOB. Assistance required with scooting hips forward to allow both feet to rest flat on floor and then pt able to maintain static sitting balance with S.  Min A x 2 for sit to stand from bed with goal to transfer to [facility] for toileting, however, pt only able to stand for a few seconds and then needed to sit down again. Max A overall for ADL completion currently. Recommend SNF at d/c.   Cont OT     Patient will benefit from skilled intervention to address the above impairments. Patients rehabilitation potential is considered to be Good  Factors which may influence rehabilitation potential include:   []             None noted  [x]             Mental ability/status  [x]             Medical condition  []             Home/family situation and support systems  [x]             Safety awareness  []             Pain tolerance/management  []             Other:        PLAN :  Recommendations and Planned Interventions:  [x]               Self Care Training                  [x]        Therapeutic Activities  [x]               Functional Mobility Training    [x]        Cognitive Retraining  [x]               Therapeutic Exercises           [x]        Endurance Activities  [x]               Balance Training                   []        Neuromuscular Re-Education  []               Visual/Perceptual Training     []   Home Safety Training  [x]               Patient Education                 []        Family Training/Education  []               Other (comment):    Frequency/Duration: Patient will be followed by occupational therapy 3-5 times a week to address goals. Discharge Recommendations: Rehab  Further Equipment Recommendations for Discharge: rolling walker and N/A     SUBJECTIVE:   Patient stated I'm tired.     OBJECTIVE DATA SUMMARY:     Past Medical History:   Diagnosis Date    Arthritis     Blood clot in vein 2004    left leg     [facility]     Cancer ([facility]     right breast    GERD (gastroesophageal reflux disease)     High cholesterol     Hypertension     Other ill-defined conditions(799.89)     high cholestrol    Vertigo      Past Surgical History:   Procedure Laterality Date    ABDOMEN SURGERY PROC UNLISTED      removal of tumor abdomen    HX BREAST LUMPECTOMY      HX GI      colonosocpy    HX KNEE REPLACEMENT      HX ORTHOPAEDIC      total knee replacement bilateral    HX ORTHOPAEDIC      excision of cyst left hand    HX TUBAL LIGATION      HX TUMOR REMOVAL       Barriers to Learning/Limitations: some confusion noted  Compensate with: visual, verbal, tactile, kinesthetic cues/model  Prior Level of Function/Home Situation: Pt reports independence with ADL previously  Home Situation  Home Environment: Apartment  One/Two Story Residence: One story  Living Alone: No  Support Systems: Spouse/Significant Other/Partner, Child(lefty)  Patient Expects to be Discharged to[de-identified] Apartment  Current DME Used/Available at Home: Cane, straight, Walker, rolling  Tub or Shower Type: Tub/Shower combination  [x]  Right hand dominant   []  Left hand dominant  Cognitive/Behavioral Status:  Neurologic State: Alert  Orientation Level: Oriented to person;Oriented to place; Disoriented to situation;Disoriented to time  Cognition: Follows commands  Some confusion     Skin: c/o itching brady arms and legs; nurse aware   Edema: brady hands   Coordination:  Coordination: Within functional limits          Balance:  Sitting: Intact  Standing: Impaired  Strength:  Strength: Generally decreased, functional  Tone & Sensation:  Tone: Normal  Range of Motion:  AROM: Generally decreased, functional  Functional Mobility and Transfers for ADLs:  Bed Mobility:  Rolling: Minimum assistance  Supine to Sit: Moderate assistance; Additional time  Sit to Supine: Moderate assistance; Additional time     Transfers:  Sit to Stand: Minimum assistance;Assist x2      Toilet Transfer :  (unable to transfer to [facility])      ADL Assessment:  Feeding:  (drank a little juice; uninterested in eating breakfast)     Bathing: Maximum assistance  Upper Body Dressing: Maximum assistance  Lower Body Dressing: Maximum assistance  Toileting: Maximum assistance     ADL Intervention:  Feeding  Drink to Mouth: Supervision/set-up     Toileting  Toileting Assistance:  (bedpan)     Pain:  Pre-treatment: 0/10  Post-treatment: 0/10    Activity Tolerance:   Fatigues easily  Please refer to the flowsheet for vital signs taken during this treatment. After treatment:   [] Patient left in no apparent distress sitting up in chair  [x] Patient left in no apparent distress in bed  [x] Call bell left within reach  [x] Nursing notified  [] Caregiver present  [] Bed alarm activated    COMMUNICATION/EDUCATION:    [] Home safety education was provided and the patient/caregiver indicated understanding. [] Patient/family have participated as able in goal setting and plan of care. [x] Patient/family agree to work toward stated goals and plan of care. [] Patient understands intent and goals of therapy, but is neutral about his/her participation. [] Patient is unable to participate in goal setting and plan of care. Thank you for this referral.  Ned Long, OTR/L  Time Calculation: 23 mins    G-Codes (GP)  Self Care   Current  CM= 80-99%   Goal  CJ= 20-39%  The severity rating is based on the professional judgement & direct observation of Level of Assistance required for Functional Mobility and ADLs. Eval Complexity: History: LOW Complexity : Brief history review ; Examination: HIGH Complexity : 5 or more performance deficits relating to physical, cognitive , or psychosocial skils that result in activity limitations and / or participation restrictions; Decision Making:MEDIUM Complexity : Patient may present with comorbidities that affect occupational performnce.  Miniml to moderate modification of tasks or assistance (eg, physical or verbal ) with assesment(s) is necessary to enable patient to complete evaluation

## 2018-01-18 NOTE — CONSULTS
Johny Hall M.D  [address] [facility] [address]  Phone [phone] Fax [phone]  Pulmonary Critical Care & Sleep Medicine       Name: Lieutenant Serra MRN: [id number]   : 1945 Hospital: [facility] [place]   Date: 2018        Critical Care Initial Patient Consult    Requesting MD:                                                  Reason for CC Consult:    IMPRESSION:   Patient Active Problem List   Diagnosis Code    Other and unspecified noninfectious gastroenteritis and colitis(558.9) K52.9    Rectal bleeding K62.5    HTN (hypertension) I10    Unspecified constipation K59.00    Constipation K59.00    Ischemic colitis ([facility] [address] K55.9    Diverticulosis of colon K57.30    Internal hemorrhoids K64.8    Cellulitis of abdominal wall L03.311    Cellulitis of groin L03.314    UTI (urinary tract infection) N39.0    Renal insufficiency N28.9    Sepsis ([facility] [address] A41.9    GERD (gastroesophageal reflux disease) K21.9    HLD (hyperlipidemia) E78.5    Moderate dehydration T38.5    Diastolic CHF, chronic (HCC) I50.32    SIRS (systemic inflammatory response syndrome) ([facility]) R65.10    Abdominal wall cellulitis L03.311    Proctitis K62.89    Acute encephalopathy G93.40    Rash R21    Hypotension I95.9    Severe sepsis (HCC) A41.9, R65.20 ·   Possible pyelonephritis Abd wall cellulitis resulting in sepsis  · ? Rash and itching ? Allergy to medication . Yesi Woodson Did not receive any new medication. · Mild renal insufficiency due to ATN and hypotension  · Lactic acidosis due to sepsis   · Elevated INR ? Etiology ?  Early DIC vs poor po intake  · H/o Proctitis not seen on current CT  · Large Hiatal hernia     PLAN:  -- Fluid bolus  -- Monitor CVP  -- Check urine culture  -- Echo  -- Cardiac enz ok  -- oxygenation is ok  -- DC flagyl and doxycycline  -- Add azactam  -- Continue vanco levaquin and fluconazole  -- Dc eztemide   -- Give steroids and benadryl for itching and rash  -- SSI  -- PPI  -- Elevated INR not sure due to poor po intake or going in DIC  -- Give vitamin K  -- Recheck inr in am  -- Central line placed due to poor access and low blood pressure  -- Monitor Cr  -- Sepsis protocol initiated    CVS:Hypotension most likely due to sepsis. Give another liter of fluid. Check Echo. Cardiac enz is ok will monitor. RS:No issues with hypoxia. Aspiration precaution. Prn bronchodilators. ID:UTi and ABD wall cellulitis. Vanco since 11/17, Levaquin 11/17. Stop Doxy 11/18. Stop Flagyl 11/18. Diflucan since 11/17. Rpt urine culture. Follow up on Blood culture. ENDO:SSI   GI:Hiatal hernia. Continue PPI   RENAL:Monitor CR. K and MG ok  CNS:NO acute issue  HEMATOLOGY:elevated INR check fibrinogen and d dimer. PLT is ok. Will give vitamin K.  MUSCULOSKELETAL:No acute issue. Rash will give benadryl and steroids. PAIN AND SEDATION:no issue  · Skin/Wound: Rash management as above  · Electrolytes: Replace electrolytes per ICU electrolyte replacement protocol. · IVF: NS 125cc/hr   · Nutrition: cont diet  · Prophylaxis: DVT Prophylaxis with SCDs,. GI Prophylaxis. · Restraints: none  · PT/OT eval and treat. OOB when appropriate. · Lines/Tubes: none. No sheffield or central line. ADVANCE DIRECTIVE:Full code  FAMILY DISCUSSION:spoke with chica and daughter. Daughter is not happy with care at [facility] and suggested she wish to transfer her mom to another hospital few time but wish to continue care at this point  Quality Care: PPI, DVT prophylaxis, HOB elevated, Infection control all reviewed and addressed. Events and notes from last 24 hours reviewed. Care plan discussed with nursing CC TIME:65 min    · Labs and images personally seen and available reports reviewed. · All current medicines are reviewed and doses and prescription adjusted. Subjective/History:   Teresa Arboleda is a 81 y.o. female who comes to the ed with complaints of abd pain.  Patient states she has been having abd pain for the past week or so but it was very severe today therefore came to the ed. Due to this pain, she has not been eating and drinking much. She states pain is on the outside of the abd on the skin pain, its tender and warm to touch. It sharp and constant in nature with 10/10 when worst. No nausea vomiting and other complaints. Denies any fevers, chills, urinary symptoms at home. Does report of dry mouth. Other medical conditions stable at this time and takes her meds as prescribed. In the ED she was noted to have elevated WBC, HR and Cr. CT A/P done did not show any acute findings. On physical exam she was noted to have erythematous region of her lower abd.     1/18/18  Patient was admitted to [facility]  Over period of time in am started becoming hypotensive  Patient also complained about rash all over roman and itching  Not been started on any new meds  ABD ct was repeated showed cellulitis and possible left pararenal fluid suggestive of pyelonephritis  Lactate 3.5  INR 2 ? Not on coumadin  PLT is normal  Recent CT showed proctitis on Jan 1st  Patient is Diabetic  Denies any diarrhoes just not feeling well         Past Medical History:   Diagnosis Date    Arthritis     Blood clot in vein 2004    left leg     [facility] cancer ([facility] [address]     Cancer ([facility] [address]     right breast    GERD (gastroesophageal reflux disease)     High cholesterol     Hypertension     Other ill-defined conditions(799.89)     high cholestrol    Vertigo       Past Surgical History:   Procedure Laterality Date    ABDOMEN SURGERY PROC UNLISTED      removal of tumor abdomen    HX BREAST LUMPECTOMY      HX GI      colonosocpy    HX KNEE REPLACEMENT      HX ORTHOPAEDIC      total knee replacement bilateral    HX ORTHOPAEDIC      excision of cyst left hand    HX TUBAL LIGATION      HX TUMOR REMOVAL        Prior to Admission medications    Medication Sig Start Date End Date Taking?  Authorizing Provider sucralfate (CARAFATE) 1 gram tablet Take 1 Tab by mouth four (4) times daily. 1/1/17  Yes Bryant Banda MD   nortriptyline (PAMELOR) 25 mg capsule Take 25 mg by mouth nightly. Yes Ghada Solitario MD   lovastatin (MEVACOR) 40 mg tablet Take 40 mg by mouth nightly. Yes Ghada Solitario MD   cyclobenzaprine (FLEXERIL) 10 mg tablet Take 10 mg by mouth three (3) times daily as needed for Muscle Spasm(s). Yes Ghada Sloitario MD   aspirin (ASPIRIN) 325 mg tablet Take 325 mg by mouth daily. Yes Ghada Solitario MD   losartan (COZAAR) 100 mg tablet Take 100 mg by mouth daily. Yes Ghada Solitario MD   famotidine (PEPCID) 20 mg tablet Take 20 mg by mouth two (2) times a day. Yes Ghada Solitario MD   ezetimibe (ZETIA) 10 mg tablet Take 10 mg by mouth nightly.    Yes Ghada Solitario MD     Current Facility-Administered Medications   Medication Dose Route Frequency    sodium bicarbonate tablet 325 mg  325 mg Oral BID    metroNIDAZOLE (FLAGYL) IVPB premix 500 mg  500 mg IntraVENous Q8H    0.9% sodium chloride infusion  125 mL/hr IntraVENous CONTINUOUS    vancomycin (VANCOCIN) 750 mg in dextrose 5% 250 mL IVPB  750 mg IntraVENous Q24H    lidocaine (XYLOCAINE) 10 mg/mL (1 %) injection        [START ON 1/19/2018] famotidine (PEPCID) tablet 20 mg  20 mg Oral DAILY    doxycycline (MONODOX) capsule 100 mg  100 mg Oral Q12H    levoFLOXacin (LEVAQUIN) 750 mg in D5W IVPB  750 mg IntraVENous Q48H    aspirin (ASPIRIN) tablet 325 mg  325 mg Oral DAILY    ezetimibe (ZETIA) tablet 10 mg  10 mg Oral QHS    lovastatin (MEVACOR) tablet 40 mg  40 mg Oral QHS    nortriptyline (PAMELOR) capsule 25 mg  25 mg Oral QHS    sucralfate (CARAFATE) tablet 1 g  1 g Oral QID    heparin (porcine) injection 5,000 Units  5,000 Units SubCUTAneous Q8H    nystatin (MYCOSTATIN) 100,000 unit/gram powder   Topical BID    fluconazole (DIFLUCAN) tablet 200 mg  200 mg Oral DAILY    Vancomycin - Pharmacy to dose  1 Each Other Rx Dosing/Monitoring     Allergies Allergen Reactions    Amiloride Nausea and Vomiting    Amoxicillin Rash    Bactrim [Sulfamethoprim Ds] Unknown (comments)    Gabapentin Rash    Ibuprofen Unknown (comments)    Zestoretic [Lisinopril-Hydrochlorothiazide] Unknown (comments)      Social History   Substance Use Topics    Smoking status: Passive Smoke Exposure - Never Smoker    Smokeless tobacco: Never Used    Alcohol use No      History reviewed. No pertinent family history.      Objective:   Vital Signs:    Visit Vitals    /62    Pulse 98    Temp 97.2 °F (36.2 °C)    Resp 24    Ht 4' 11\" (1.499 m)    Wt 78.4 kg (172 lb 13.5 oz)    SpO2 99%    BMI 34.91 kg/m2       O2 Device: Room air       Temp (24hrs), Av.9 °F (37.2 °C), Min:97.2 °F (36.2 °C), Max:102.5 °F (39.2 °C)       Intake/Output:   Last shift:       0701 -  1900  In: 468.8 [I.V.:468.8]  Out: -   Last 3 shifts:  190 -  0700  In: 3846.7 [P.O.:960; I.V.:2886.7]  Out: 2275 [Urine:2275]    Intake/Output Summary (Last 24 hours) at 18 1642  Last data filed at 18 1115   Gross per 24 hour   Intake          1188.75 ml   Output             1600 ml   Net          -411.25 ml       Review of Systems:  HEENT: No epistaxis, no nasal drainage, no difficulty in swallowing, no redness in eyes  Respiratory: as above  Cardiovascular: no chest pain, no palpitations, no chronic leg edema, no syncope  Gastrointestinal: no abd pain, no vomiting, no diarrhea, no bleeding symptoms  Genitourinary: No urinary symptoms or hematuria  Integument/breast: No ulcers or rashes  Musculoskeletal:Neg  Neurological: No focal weakness, no seizures, no headaches  Behvioral/Psych: No anxiety, no depression  Constitutional: No fever, no chills, no weight loss, no night sweats     Objective:    General: comfortable, no acute distress  HEENT: pupils reactive, sclera anicteric, EOM intact  Neck: No adenopathy or thyroid swelling, no lymphadenopathy or JVD, supple  CVS: S1S2 no murmurs  RS: Mod AE bilaterally, No rales no wheeze  Abd: soft, tenderness on right lower abd  Neuro: non focal, awake, alert  Extrm: no leg edema, clubbing or cyanosis  Lymph node: No obvious palpable lymph node appreciated. Skin: Diffuse rash all over roman  CBC w/Diff Recent Labs      01/18/18   0255  01/16/18   0504  01/15/18   2040   WBC  19.5*  12.9  14.3*   RBC  4.86  5.14  4.99   HGB  14.5  15.3  15.2   HCT  44.5  46.5*  44.9   PLT  196  138  164   GRANS  48   --   57   LYMPH  14*   --   17*   EOS  3   --   9*        Chemistry Recent Labs      01/18/18   1320  01/18/18   0255  01/17/18   0437  01/16/18   0504  01/15/18   2040   GLU   --   136*  83  73*  126*   NA   --   139  137  136  136   K   --   4.7  4.7  4.9  4.6   CL   --   110*  108  107  102   CO2   --   18*  17*  17*  25   BUN   --   34*  46*  44*  45*   CREA   --   1.37*  1.87*  2.14*  2.62*   CA   --   7.5*  7.6*  7.8*  8.0*   MG   --   2.0   --   1.6   --    PHOS   --    --    --   2.5   --    AGAP   --   11  12  12  9   BUCR   --   25*  25*  21*  17   AP  123*   --    --    --   65   TP  3.5*   --    --    --   5.6*   ALB  1.1*   --    --    --   2.0*   GLOB  2.4   --    --    --   3.6   AGRAT  0.5*   --    --    --   0.6*        Lactic Acid Lactic acid   Date Value Ref Range Status   01/18/2018 3.5 (HH) 0.4 - 2.0 MMOL/L Final     Comment:     CALLED TO AND CORRECTLY REPEATED BY:  Juan A Valdez RN ICU AT 1500 ON [id number] BY [facility]       Recent Labs      01/18/18   1320  01/18/18   0800  01/18/18   0255   LAC  3.5*  3.1*  2.8*        Micro  Recent Labs      01/15/18   1950  01/15/18   1840   CULT  NO GROWTH 2 DAYS  NO GROWTH 3 DAYS     Recent Labs      01/15/18   1950  01/15/18   1840   CULT  NO GROWTH 2 DAYS  NO GROWTH 3 DAYS        ABG No results for input(s): PHI, PHI, POC2, PCO2I, PO2, PO2I, HCO3, HCO3I, FIO2, FIO2I in the last 72 hours.      Liver Enzymes Protein, total   Date Value Ref Range Status   01/18/2018 3.5 (L) 6.4 - 8.2 g/dL Final     Albumin Date Value Ref Range Status   01/18/2018 1.1 (L) 3.4 - 5.0 g/dL Final     Globulin   Date Value Ref Range Status   01/18/2018 2.4 2.0 - 4.0 g/dL Final     A-G Ratio   Date Value Ref Range Status   01/18/2018 0.5 (L) 0.8 - 1.7   Final     AST (SGOT)   Date Value Ref Range Status   01/18/2018 85 (H) 15 - 37 U/L Final     Alk. phosphatase   Date Value Ref Range Status   01/18/2018 123 (H) 45 - 117 U/L Final     Recent Labs      01/18/18   1320  01/15/18   2040   TP  3.5*  5.6*   ALB  1.1*  2.0*   GLOB  2.4  3.6   AGRAT  0.5*  0.6*   SGOT  85*  43*   AP  123*  65        Cardiac Enzymes Lab Results   Component Value Date/Time    TROIQ <0.02 01/18/2018 01:20 PM        BNP No results found for: BNP, BNPP, XBNPT     Coagulation Recent Labs      01/18/18   1320  01/16/18   0504  01/15/18   2040   PTP  22.0*  18.7*  17.2*   INR  2.0*  1.6*  1.5*   APTT  56.9*  41.6*   --          Thyroid  No results found for: T4, T3U, TSH, TSHEXT       Lipid Panel No results found for: CHOL, CHOLPOCT, CHOLX, CHLST, CHOLV, 480314, HDL, LDL, LDLC, DLDLP, 981488, VLDLC, VLDL, TGLX, TRIGL, TRIGP, TGLPOCT, CHHD, CHHDX       Urinalysis Lab Results   Component Value Date/Time    Color DARK YELLOW 01/15/2018 07:50 PM    Appearance CLOUDY 01/15/2018 07:50 PM    Specific gravity 1.026 01/15/2018 07:50 PM    pH (UA) 5.0 01/15/2018 07:50 PM    Protein TRACE 01/15/2018 07:50 PM    Glucose NEGATIVE  01/15/2018 07:50 PM    Ketone TRACE 01/15/2018 07:50 PM    Bilirubin MODERATE 01/15/2018 07:50 PM    Urobilinogen 1.0 01/15/2018 07:50 PM    Nitrites POSITIVE 01/15/2018 07:50 PM    Leukocyte Esterase SMALL 01/15/2018 07:50 PM    Epithelial cells FEW 01/15/2018 07:50 PM    Bacteria 1+ 01/15/2018 07:50 PM    WBC 0 to 3 01/15/2018 07:50 PM    RBC 0 to 1 01/15/2018 07:50 PM        XR (Most Recent).  CXR reviewed by me and compared with previous CXR   Results from Hospital Encounter encounter on 01/15/18   XR CHEST PORT   Narrative EXAM:Chest X-Ray      History: Hypoxia    Technique:  Portable Frontal View    Comparison: 01/15/2018, 01/01/2017    _______________    FINDINGS:  Pulmonary hypoinflation rotation degraded exam.  Redemonstration of a large hiatus hernia with minimal hazy reticular opacities  in both lower lungs. Stable cardiac silhouette and mediastinal contours left  rotation degraded exam. No pneumothorax or effusion. Stable intact osseous structures    _______________         Impression IMPRESSION:    1. Pulmonary hypoinflation and rotation degraded examination with bilateral  lower lung opacities, probably representing atelectasis. 2. Large hiatus hernia. CT (Most Recent)   Results from Hospital Encounter encounter on 01/15/18   CT ABD PELV WO CONT   Narrative EXAM: CT of the Abdomen and Pelvis    INDICATION: Abdominal wall cellulitis, questionable abscess formation, patient  still febrile. COMPARISON: 01/15/2018    TECHNIQUE: Axial CT imaging of the abdomen and pelvis was performed without  intravenous contrast. Multiplanar reformats were generated. Dose reduction  techniques used: Automated exposure control, adjustment of the mAs and/or kVp  according to patient's size, and iterative reconstruction techniques. _______________    FINDINGS:    LOWER CHEST: Right middle lobe and left lower lung persistent atelectasis. No  consolidation or pleural effusion. No convincing pericardial effusion. Large  hiatus hernia, similar to prior exam.. LIVER, BILIARY: Noncontrast evaluation of the liver demonstrates an unchanged  stable hypodensity in the left lobe, too small to characterize, probably benign. No acute or interval change. No biliary dilation. Gallbladder is unremarkable. PANCREAS: No acute abnormality. SPLEEN: Normal.    ADRENALS: Normal.    KIDNEYS/URETERS/BLADDER: No findings of hydronephrosis. Interval developing mild  left pararenal stranding and a tiny amount of fluid. No hydroureter.   Decompressed bladder with Gonzalez catheter in place. LYMPH NODES: No enlarged lymph nodes. GASTROINTESTINAL TRACT: No bowel dilation or wall thickening. PELVIC ORGANS: Multiple coarse calcification stations within the uterus, in  keeping with degenerative leiomyoma. VASCULATURE: Atherosclerotic vascular calcification of the abdominal aorta  without aneurysm. BONES: No acute or aggressive osseous abnormalities identified. Stable CT  appearance of multilevel severe degenerative disc disease notably at T12-L2,  L4-5 and L5-S1. Stable endplate degenerative sclerosis and irregularity prior  exams with degenerative partial vertebral body fusion at L1-2. Stable minimal  grade 1 anterolisthesis of L4/5. Severe left hip DJD. OTHER:   -Trace amount of fluid in the upper left paracolic gutter with minimal left  pararenal fascial thickening.  -Interval developing left greater the right soft tissue anasarca with left  abdominal to pelvic dermal thickening with soft tissue stranding.    _______________         Impression IMPRESSION:    1. Nonspecific CT appearance of interval mild developing left pararenal  stranding, tiny amount of perinephric fluid, small amount of fluid in the upper  left paracolic gutter with localized left perirenal fascial thickening. Diagnostic considerations may include infectious etiology such as left-sided  pyelonephritis. Recommend clinical correlation to include urinalysis. 2. Developing soft tissue anasarca, left greater the right with nonspecific left  abdominal to pelvic dermal thickening with soft tissue stranding. Diagnostic  considerations include cellulitis and/or nonspecific edema. Otherwise, no  discrete organized fluid collection on this noncontrast exam.               EKG No results found for this or any previous visit. ECHO No results found for this or any previous visit. PFT No flowsheet data found.      Other ASA reactivity:   Pre-albumin:   Ionized Calcium:   NH4:   T3, FT4:  Cortisol:  Urine Osm:  Urine Lytes:   HbA1c:          Imaging:  I have personally reviewed the patients radiographs and have reviewed the reports:     Best practice :  Fluids started at 30 ml/kg with aim to keep CVP 8 or above  Lactic acid ordered- initial and repeat Q6hrs if elevated till normalized. Cultures drawn. Antibiotic administered within 1hr-ICU  And 3hrs ED  Pressors aim MAP >65mmHg  Steriods  Glycemic control  Mech. Ventilated patients- aim to keep peak plateau pressure 72-01DL H2O. Sress ulcer prophylaxis  DVT prophylaxis    Vent bundle, Gonzalez bundle and central line bundle followed. Viktor Huang M.D.   Pulmonary Critical Care & Sleep Medicine

## 2018-01-18 NOTE — PROGRESS NOTES
Pharmacy Dosing Services:   Pharmacist Renal Dosing Progress Note for Famotidine  Physician: Joanna Reid    The following medication: Famotidine was automatically dose-adjusted per [facility] P&T Committee Protocol, with respect to renal function. Consult provided for this   81 y.o. , female , for the indication of GERD. Pt Weight:   Wt Readings from Last 1 Encounters:   01/18/18 78.4 kg (172 lb 13.5 oz)         Previous Regimen 20 mg PO BID   Serum Creatinine Lab Results   Component Value Date/Time    Creatinine 1.37 01/18/2018 02:55 AM    Creatinine, POC 2.6 01/15/2018 08:44 PM       Creatinine Clearance Estimated Creatinine Clearance: 36.6 mL/min (based on Cr of 1.37). BUN Lab Results   Component Value Date/Time    BUN 34 01/18/2018 02:55 AM    BUN, POC 42 01/15/2018 08:44 PM       Dosage changed to:  20 mg PO Daily    Additional notes: Renal function significantly improved, probably close to baseline    Pharmacy to continue to monitor patient daily. Will make dosage adjustments based upon changing renal function.   Signed SANTIAGO LopzeD. Contact information: [phone]

## 2018-01-18 NOTE — PROGRESS NOTES
INITIAL NUTRITION ASSESSMENT     RECOMMENDATIONS/PLAN:   Add Ensure Clear TID  Monitor labs/lytes, PO intakes, skin integrity, wt, fluid status, BM  REASON FOR ASSESSMENT:   []  MD Consult:    [x] General Nutrition Management and Supplements-verbal during rounds       NUTRITION ASSESSMENT:   Client History: 81 yrs old Female admitted with sepsis , sandra on ckds , cellulitis , proctitis. Pt had hypoglycemia event this morning was asked during rounds to see pt for supplements and general nutrition help to prevent lows. Spoke w/ pt who report  cooks for her at home. At breakfast pt eats eggs, barroso, waffles, sausage, and oatmeal depending on what  prepares her. For lunch and dinner she either has pizza, burger eusebia, or stir dang w/ rice. Pt agreed to take Ensure Clear TID. Also, helped pt order lunch-ordered stir dang w/ chicken, ginger ale, and angle food cake. Pt agreed in eating tray. Will continue to monitor appetite         PMHx: arthritis, blood clot in vein, breast ca, GERD, high cholesterol, HTN, vertigo   Cultural/Adventist Food Preferences: None Identified    FOOD/NUTRITION HISTORY  Diet History: Per glycemic control in rounds pt had hypoglycemia w/ poor appetite. Spoke w/ pt who report  cooks for her at home. At breakfast pt eats eggs, barroso, waffles, sausage, and oatmeal depending on what  prepares her. For lunch and dinner she either has pizza, burger eusebia, or stir dang w/ rice. Pt agreed to take Ensure Clear TID. Also, helped pt order lunch-ordered stir dang w/ chicken, ginger ale, and angle food cake. Pt agreed in eating tray.  Will continue to monitor appetite    Food Allergies:  [x] NKFA     Pertinent PTA Medications: carafate, pepcid    NUTRITION INTAKE   Diet Order:  Renal, AHA-Low-Chol Fat     Average PO Intake:       Patient Vitals for the past 100 hrs:   % Diet Eaten   01/18/18 1119 240 %   01/18/18 0730 0 %   01/17/18 2213 0 %   01/17/18 1915 0 %   01/17/18 0645 0 % 01/16/18 1433 100 %      Pertinent Medications:  [x] Reviewed; pepcid, heparin, mycostatin, sucralfate,   Insulin:  [] SSI  [] Pre-meal   []  Basal   [] Drip  [x] None  Pt expected to meet estimated nutrient needs through next review:          [x]  Yes     [] No;  ANTHROPOMETRICS  Height: 4' 11\" (149.9 cm)       Weight: 78.4 kg (172 lb 13.5 oz)    BMI: 34.9 kg/m^2  -  obese (30%-39.9% BMI)        Weight change: pt was 146# on 6/20/16 pt currently 172#. Noted non-pitting edema. Comparison to Reference Standards:  IBW: 98 lbs      %IBW: 176%      AdjBW: 53kg    NUTRITION-FOCUSED PHYSICAL ASSESSMENT  Skin: No pressure injury noted. GI: No Bm noted    BIOCHEMICAL DATA & MEDICAL TESTS  Pertinent Labs:  [x] Reviewed; Ca-7.5     NUTRITION PRESCRIPTION  Calories: 0587-3095 kcal/day based on 25-30kcal/kg of IBW  Protein:63-78 g/day based on 0.8-1.0 g/kg ABW  CHO: 139-167 g/day based on 50% of total energy  Fluid: 7287-6297 ml/day based on 1 kcal/ml      NUTRITION DIAGNOSES:   1. Inadequate oral intake related to personal preference as evidence by 0% Po intake at most meals. NUTRITION INTERVENTIONS:   INTERVENTIONS:        GOALS:  1. Commercial Beverage-Ensure Clear TID 1.  Encourage PO intake >75% at most meals by next review 4 days             LEARNING NEEDS (Diet, Supplementation, Food/Nutrient-Drug Interaction):   [x] None Identified  [] Inpatient education provided/documented    [] Identified and patient:  [] Declined     [] Was not appropriate/indicated  NUTRITION MONITORING /EVALUATION:   Follow PO intake  Monitor wt  Monitor renal labs, electrolytes, fluid status  Monitor for additional supplement needs    [x] Participated in Interdisciplinary Rounds  [x] [address] Reviewed/Documented  DISCHARGE NUTRITION RECOMMENDATIONS ADDRESSED:        [x] To be determined closer to discharge    NUTRITION RISK:     [x]  At risk                     []  Not currently at risk     Will follow-up per policy.   Pasquale Miller, RD  PAGER:  [phone]

## 2018-01-18 NOTE — PROGRESS NOTES
Chart reviewed. Met with patient at bedside. Discharge date: TBD. Pt offered FOC for SNF and HH. Pt adamantly refusing SNF at this point. Pt states she lives with  and son who can assist. Pt selected [address]. Pt states she has RW at home. Referral given to CMS [facility] for assistance. CM will cont to follow.     1200  Pt will transfer to ICU

## 2018-01-19 ENCOUNTER — APPOINTMENT (OUTPATIENT)
Dept: GENERAL RADIOLOGY | Age: 73
DRG: 871 | End: 2018-01-19
Attending: INTERNAL MEDICINE
Payer: MEDICARE

## 2018-01-19 LAB
ANION GAP SERPL CALC-SCNC: 10 MMOL/L (ref 3–18)
APTT PPP: 45.6 SEC (ref 23–36.4)
BASOPHILS # BLD: 0 K/UL (ref 0–0.1)
BASOPHILS NFR BLD: 0 % (ref 0–3)
BUN SERPL-MCNC: 30 MG/DL (ref 7–18)
BUN/CREAT SERPL: 26 (ref 12–20)
CA-I SERPL-SCNC: 1.16 MMOL/L (ref 1.12–1.32)
CALCIUM SERPL-MCNC: 7.6 MG/DL (ref 8.5–10.1)
CHLORIDE SERPL-SCNC: 112 MMOL/L (ref 100–108)
CO2 SERPL-SCNC: 18 MMOL/L (ref 21–32)
CREAT SERPL-MCNC: 1.16 MG/DL (ref 0.6–1.3)
DIFFERENTIAL METHOD BLD: ABNORMAL
EOSINOPHIL # BLD: 3.6 K/UL (ref 0–0.4)
EOSINOPHIL NFR BLD: 16 % (ref 0–5)
ERYTHROCYTE [DISTWIDTH] IN BLOOD BY AUTOMATED COUNT: 15.7 % (ref 11.6–14.5)
GLUCOSE BLD STRIP.AUTO-MCNC: 112 MG/DL (ref 70–110)
GLUCOSE BLD STRIP.AUTO-MCNC: 113 MG/DL (ref 70–110)
GLUCOSE BLD STRIP.AUTO-MCNC: 146 MG/DL (ref 70–110)
GLUCOSE BLD STRIP.AUTO-MCNC: 90 MG/DL (ref 70–110)
GLUCOSE SERPL-MCNC: 110 MG/DL (ref 74–99)
HCT VFR BLD AUTO: 35.8 % (ref 35–45)
HGB BLD-MCNC: 12 G/DL (ref 12–16)
INR PPP: 1.6 (ref 0.8–1.2)
LACTATE SERPL-SCNC: 1.9 MMOL/L (ref 0.4–2)
LACTATE SERPL-SCNC: 2 MMOL/L (ref 0.4–2)
LDH SERPL L TO P-CCNC: 721 U/L (ref 81–234)
LYMPHOCYTES # BLD: 3.8 K/UL (ref 0.8–3.5)
LYMPHOCYTES NFR BLD: 17 % (ref 20–51)
MAGNESIUM SERPL-MCNC: 1.8 MG/DL (ref 1.6–2.6)
MAGNESIUM SERPL-MCNC: 2 MG/DL (ref 1.6–2.6)
MCH RBC QN AUTO: 30.2 PG (ref 24–34)
MCHC RBC AUTO-ENTMCNC: 33.5 G/DL (ref 31–37)
MCV RBC AUTO: 89.9 FL (ref 74–97)
METAMYELOCYTES NFR BLD MANUAL: 1 %
MONOCYTES # BLD: 1.8 K/UL (ref 0–1)
MONOCYTES NFR BLD: 8 % (ref 2–9)
MYELOCYTES NFR BLD MANUAL: 1 %
NEUTS BAND NFR BLD MANUAL: 11 % (ref 0–5)
NEUTS SEG # BLD: 10.4 K/UL (ref 1.8–8)
NEUTS SEG NFR BLD: 46 % (ref 42–75)
PHOSPHATE SERPL-MCNC: 1.5 MG/DL (ref 2.5–4.9)
PHOSPHATE SERPL-MCNC: 2.6 MG/DL (ref 2.5–4.9)
PLATELET # BLD AUTO: 208 K/UL (ref 135–420)
PLATELET COMMENTS,PCOM: ABNORMAL
PMV BLD AUTO: 10 FL (ref 9.2–11.8)
POTASSIUM SERPL-SCNC: 4.5 MMOL/L (ref 3.5–5.5)
PROTHROMBIN TIME: 18.6 SEC (ref 11.5–15.2)
RBC # BLD AUTO: 3.98 M/UL (ref 4.2–5.3)
RBC MORPH BLD: ABNORMAL
SODIUM SERPL-SCNC: 140 MMOL/L (ref 136–145)
WBC # BLD AUTO: 22.5 K/UL (ref 4.6–13.2)
WBC MORPH BLD: ABNORMAL

## 2018-01-19 PROCEDURE — 74011000250 HC RX REV CODE- 250: Performed by: INTERNAL MEDICINE

## 2018-01-19 PROCEDURE — 83605 ASSAY OF LACTIC ACID: CPT | Performed by: HOSPITALIST

## 2018-01-19 PROCEDURE — 85025 COMPLETE CBC W/AUTO DIFF WBC: CPT | Performed by: HOSPITALIST

## 2018-01-19 PROCEDURE — 97116 GAIT TRAINING THERAPY: CPT

## 2018-01-19 PROCEDURE — 82962 GLUCOSE BLOOD TEST: CPT

## 2018-01-19 PROCEDURE — 85730 THROMBOPLASTIN TIME PARTIAL: CPT | Performed by: HOSPITALIST

## 2018-01-19 PROCEDURE — 80048 BASIC METABOLIC PNL TOTAL CA: CPT | Performed by: HOSPITALIST

## 2018-01-19 PROCEDURE — 74011250636 HC RX REV CODE- 250/636: Performed by: INTERNAL MEDICINE

## 2018-01-19 PROCEDURE — 84100 ASSAY OF PHOSPHORUS: CPT | Performed by: HOSPITALIST

## 2018-01-19 PROCEDURE — 85610 PROTHROMBIN TIME: CPT | Performed by: HOSPITALIST

## 2018-01-19 PROCEDURE — 74011250636 HC RX REV CODE- 250/636: Performed by: EMERGENCY MEDICINE

## 2018-01-19 PROCEDURE — 74011000258 HC RX REV CODE- 258: Performed by: INTERNAL MEDICINE

## 2018-01-19 PROCEDURE — 83615 LACTATE (LD) (LDH) ENZYME: CPT | Performed by: HOSPITALIST

## 2018-01-19 PROCEDURE — 74011250636 HC RX REV CODE- 250/636: Performed by: HOSPITALIST

## 2018-01-19 PROCEDURE — 83735 ASSAY OF MAGNESIUM: CPT | Performed by: HOSPITALIST

## 2018-01-19 PROCEDURE — 71045 X-RAY EXAM CHEST 1 VIEW: CPT

## 2018-01-19 PROCEDURE — 84100 ASSAY OF PHOSPHORUS: CPT | Performed by: INTERNAL MEDICINE

## 2018-01-19 PROCEDURE — 74011250637 HC RX REV CODE- 250/637: Performed by: INTERNAL MEDICINE

## 2018-01-19 PROCEDURE — 97530 THERAPEUTIC ACTIVITIES: CPT

## 2018-01-19 PROCEDURE — 65610000006 HC RM INTENSIVE CARE

## 2018-01-19 PROCEDURE — 83735 ASSAY OF MAGNESIUM: CPT | Performed by: INTERNAL MEDICINE

## 2018-01-19 PROCEDURE — 82330 ASSAY OF CALCIUM: CPT | Performed by: INTERNAL MEDICINE

## 2018-01-19 PROCEDURE — 74011250637 HC RX REV CODE- 250/637: Performed by: HOSPITALIST

## 2018-01-19 RX ORDER — MAGNESIUM SULFATE HEPTAHYDRATE 40 MG/ML
2 INJECTION, SOLUTION INTRAVENOUS ONCE
Status: DISCONTINUED | OUTPATIENT
Start: 2018-01-19 | End: 2018-01-19

## 2018-01-19 RX ORDER — SODIUM,POTASSIUM PHOSPHATES 280-250MG
1 POWDER IN PACKET (EA) ORAL ONCE
Status: COMPLETED | OUTPATIENT
Start: 2018-01-19 | End: 2018-01-19

## 2018-01-19 RX ORDER — SODIUM CHLORIDE 450 MG/100ML
50 INJECTION, SOLUTION INTRAVENOUS CONTINUOUS
Status: DISCONTINUED | OUTPATIENT
Start: 2018-01-19 | End: 2018-01-20

## 2018-01-19 RX ORDER — MAGNESIUM SULFATE 1 G/100ML
1 INJECTION INTRAVENOUS ONCE
Status: COMPLETED | OUTPATIENT
Start: 2018-01-19 | End: 2018-01-20

## 2018-01-19 RX ORDER — SODIUM,POTASSIUM PHOSPHATES 280-250MG
2 POWDER IN PACKET (EA) ORAL 2 TIMES DAILY
Status: DISCONTINUED | OUTPATIENT
Start: 2018-01-19 | End: 2018-01-19

## 2018-01-19 RX ADMIN — SODIUM CHLORIDE 50 ML/HR: 450 INJECTION, SOLUTION INTRAVENOUS at 10:07

## 2018-01-19 RX ADMIN — ASPIRIN 325 MG ORAL TABLET 325 MG: 325 PILL ORAL at 08:58

## 2018-01-19 RX ADMIN — NYSTATIN: 100000 POWDER TOPICAL at 09:03

## 2018-01-19 RX ADMIN — SODIUM BICARBONATE 650 MG TABLET 325 MG: at 21:13

## 2018-01-19 RX ADMIN — AZTREONAM 1 G: 1 INJECTION, POWDER, LYOPHILIZED, FOR SOLUTION INTRAMUSCULAR; INTRAVENOUS at 16:24

## 2018-01-19 RX ADMIN — SODIUM PHOSPHATE, MONOBASIC, MONOHYDRATE AND SODIUM PHOSPHATE, DIBASIC ANHYDROUS 9 MMOL: 276; 142 INJECTION, SOLUTION INTRAVENOUS at 21:20

## 2018-01-19 RX ADMIN — SUCRALFATE 1 G: 1 TABLET ORAL at 21:13

## 2018-01-19 RX ADMIN — AZTREONAM 1 G: 1 INJECTION, POWDER, LYOPHILIZED, FOR SOLUTION INTRAMUSCULAR; INTRAVENOUS at 08:07

## 2018-01-19 RX ADMIN — SODIUM CHLORIDE 125 ML/HR: 900 INJECTION, SOLUTION INTRAVENOUS at 08:58

## 2018-01-19 RX ADMIN — HYDROCODONE BITARTRATE AND ACETAMINOPHEN 1 TABLET: 5; 325 TABLET ORAL at 00:38

## 2018-01-19 RX ADMIN — SODIUM BICARBONATE 650 MG TABLET 325 MG: at 08:58

## 2018-01-19 RX ADMIN — FAMOTIDINE 20 MG: 20 TABLET ORAL at 09:04

## 2018-01-19 RX ADMIN — MAGNESIUM SULFATE HEPTAHYDRATE 1 G: 1 INJECTION, SOLUTION INTRAVENOUS at 08:05

## 2018-01-19 RX ADMIN — DIPHENHYDRAMINE HYDROCHLORIDE 25 MG: 25 CAPSULE ORAL at 12:44

## 2018-01-19 RX ADMIN — METHYLPREDNISOLONE SODIUM SUCCINATE 40 MG: 40 INJECTION, POWDER, FOR SOLUTION INTRAMUSCULAR; INTRAVENOUS at 08:07

## 2018-01-19 RX ADMIN — SUCRALFATE 1 G: 1 TABLET ORAL at 12:35

## 2018-01-19 RX ADMIN — FLUCONAZOLE 200 MG: 100 TABLET ORAL at 08:58

## 2018-01-19 RX ADMIN — NORTRIPTYLINE HYDROCHLORIDE 25 MG: 25 CAPSULE ORAL at 21:13

## 2018-01-19 RX ADMIN — AZTREONAM 1 G: 1 INJECTION, POWDER, LYOPHILIZED, FOR SOLUTION INTRAMUSCULAR; INTRAVENOUS at 00:37

## 2018-01-19 RX ADMIN — VANCOMYCIN HYDROCHLORIDE 750 MG: 10 INJECTION, POWDER, LYOPHILIZED, FOR SOLUTION INTRAVENOUS at 17:07

## 2018-01-19 RX ADMIN — SUCRALFATE 1 G: 1 TABLET ORAL at 08:58

## 2018-01-19 RX ADMIN — LEVOFLOXACIN 750 MG: 5 INJECTION, SOLUTION INTRAVENOUS at 17:07

## 2018-01-19 RX ADMIN — SUCRALFATE 1 G: 1 TABLET ORAL at 17:07

## 2018-01-19 RX ADMIN — SODIUM CHLORIDE 1 ML: 9 INJECTION INTRAMUSCULAR; INTRAVENOUS; SUBCUTANEOUS at 09:06

## 2018-01-19 RX ADMIN — POTASSIUM & SODIUM PHOSPHATES POWDER PACK 280-160-250 MG 1 PACKET: 280-160-250 PACK at 08:58

## 2018-01-19 RX ADMIN — DIPHENHYDRAMINE HYDROCHLORIDE, ZINC ACETATE: 2; .1 CREAM TOPICAL at 19:17

## 2018-01-19 RX ADMIN — NYSTATIN: 100000 POWDER TOPICAL at 21:13

## 2018-01-19 RX ADMIN — SODIUM CHLORIDE 125 ML/HR: 900 INJECTION, SOLUTION INTRAVENOUS at 01:52

## 2018-01-19 NOTE — PROGRESS NOTES
Problem: Mobility Impaired (Adult and Pediatric)  Goal: *Acute Goals and Plan of Care (Insert Text)  Physical Therapy Goals  Initiated 1/18/2018 and to be accomplished within 3-7 day(s)  1. Patient will move from supine to sit and sit to supine  in bed with supervision/set-up. 2.  Patient will transfer from bed to chair and chair to bed with supervision/set-up using the least restrictive device. 3.  Patient will perform sit to stand with supervision/set-up. 4.  Patient will ambulate with supervision/set-up for 150 feet with the least restrictive device. 5.  Patient will ascend/descend 1 stairs without handrail(s) with minimal assistance/contact guard assist.   Outcome: Progressing Towards Goal  physical Therapy TREATMENT    Patient: Mercedes Rosario (89 y.o. female)  Date: 1/19/2018  Diagnosis: Sepsis ([address]  Cellulitis of abdominal wall  Cellulitis of groin  Renal insufficiency  UTI (urinary tract infection)  SIRS (systemic inflammatory response syndrome) (HCC)  UTI (urinary tract infection)  Abdominal wall cellulitis Cellulitis of abdominal wall  Precautions: Fall   Chart, physical therapy assessment, plan of care and goals were reviewed. ASSESSMENT:  Pt continues to report weakness and fatigue however improved over yesterday. Pt required Min-ModA to move to sitting EOB. Pt required Min-ModA for standing transfers. Pt was able to amb 5 side steps x 2 with RW, GB applied, Min-ModA for safety. Pt tolerated standing marches with B UE support. Pt required ModA to return to supine. Pt left supine in bed with all needs met and call bell within reach. RN notified of pt performance. Progression toward goals:  []      Improving appropriately and progressing toward goals  [x]      Improving slowly and progressing toward goals  []      Not making progress toward goals and plan of care will be adjusted     PLAN:  Patient continues to benefit from skilled intervention to address the above impairments.   Continue treatment per established plan of care. Discharge Recommendations:  Rehab  Further Equipment Recommendations for Discharge:  rolling walker     SUBJECTIVE:   Patient stated I am still feeling really weak.     OBJECTIVE DATA SUMMARY:   Critical Behavior:  Neurologic State: Alert, Appropriate for age  Orientation Level: Oriented X4  Cognition: Appropriate decision making, Appropriate for age attention/concentration, Appropriate safety awareness  Functional Mobility Training:  Bed Mobility:  Supine to Sit: Minimum assistance; Moderate assistance  Sit to Supine: Moderate assistance;Minimum assistance  Transfers:  Sit to Stand: Minimum assistance; Moderate assistance  Stand to Sit: Minimum assistance; Moderate assistance  Balance:  Sitting: Intact  Standing: Impaired; With support  Standing - Static: Fair  Standing - Dynamic : Poor  Ambulation/Gait Training:  Distance (ft): 10 Feet (ft) (side steps)  Assistive Device: Gait belt;Walker, rolling  Ambulation - Level of Assistance: Minimal assistance; Moderate assistance  Gait Abnormalities: Decreased step clearance; Step to gait  Base of Support: Widened  Stance: Time;Weight shift  Speed/Xiao: Slow;Shuffled  Step Length: Right shortened;Left shortened  Pain:  Pain Scale 1: Numeric (0 - 10)  Pain Intensity 1: 0  Activity Tolerance:   Good  Please refer to the flowsheet for vital signs taken during this treatment.   After treatment:   [] Patient left in no apparent distress sitting up in chair  [x] Patient left in no apparent distress in bed  [x] Call bell left within reach  [x] Nursing notified  [] Caregiver present  [] Bed alarm activated      Radha Hyatt   Time Calculation: 30 mins

## 2018-01-19 NOTE — PROGRESS NOTES
Problem: Falls - Risk of  Goal: *Absence of Falls  Document Santiago Fall Risk and appropriate interventions in the flowsheet.    Outcome: Progressing Towards Goal  Fall Risk Interventions:  Mobility Interventions: Bed/chair exit alarm, Communicate number of staff needed for ambulation/transfer, Strengthening exercises (ROM-active/passive), Utilize walker, cane, or other assitive device    Mentation Interventions: Bed/chair exit alarm, Adequate sleep, hydration, pain control, Familiar objects from home, Reorient patient, Room close to nurse's station    Medication Interventions: Bed/chair exit alarm, Teach patient to arise slowly, Patient to call before getting OOB    Elimination Interventions: Call light in reach, Toilet paper/wipes in reach, Toileting schedule/hourly rounds

## 2018-01-19 NOTE — PROGRESS NOTES
Pt SR on monitor, BP WNL, CVP being monitored, on room air tolerating well. A&Ox4, MIRELES weakly. Afebrile this shift, WBC 22.5, on antibiotics as ordered. Tolerating food/drink, BM this shift, OOB to bedside commode. Gonzalez removed. Pt has rash all over body, benadryl given once for itching. Skin tears noted in groin and pannus, Nystatin applied. No CHG bath d/t Rash at this time. Magnesium and Phos replaced per protocol. Worked with PT. Family by bedside, updated on POC, all questions answered, verbalized understanding. Bed is in lowest/locked position, call button is in reach.

## 2018-01-19 NOTE — PROGRESS NOTES
@1465 pt taken over awake alert oriented x4 denies pain at present. Remains on room air spo2  100%. CVP monitoring started as ordered. Gonzalez in situ draining biju cloudy urine. Sores and tears observed in abdominal folds and groin , red rash appears throughout pt body pt verbalized this started at home. Nystatin powder is present in these area. Assessment done and documented in appropriate flow sheets. Nursing management continues. @3771 no new developments pt watching TV at present. Assisted with repositioning care continues. @2330 care continues. No complaints . @2554 pt complaint of pain to fingers on rt hand ,analgesia administered as prescribed observation continues. @0100 pt verbalized that pain is gone vital signs remains stable nursing management continues. @0300 no new developments , flacc 0 pt asleep intermittently, Vital signs WNL  nursing care continues. @0500 pt resting comfortably in no distress vital signs stable continues to deny pain nursing observation continues. @0253 no chemistry results at this time lab was called   @0748 Bedside and Verbal shift change report given to Fabby Bruno (oncoming nurse) by Adair Shea (offgoing nurse). Report included the following information SBAR, Kardex, Intake/Output, MAR, Recent Results and Med Rec Status.    Alarm parameters reviewed, on and audible Appropriate for patient clinical condition

## 2018-01-19 NOTE — DIABETES MGMT
GLYCEMIC CONTROL & NUTRITION:    - Discussed in rounds, known h/o DM  - no repeat hypoglycemic events noted overnight, dextrose no longer in fluids  - diet has advanced & pt is eating per RN  - POCT + Humalog corrective coverage orders in place, recommend continue    Recent Glucose Results:   Lab Results   Component Value Date/Time     (H) 01/19/2018 04:52 AM    GLUCPOC 112 (H) 01/19/2018 11:19 AM    GLUCPOC 90 01/19/2018 06:33 AM    GLUCPOC 102 01/18/2018 09:35 PM     Lab Results   Component Value Date/Time    Hemoglobin A1c 6.1 01/18/2018 05:10 PM             Helena Capone, MPH, RD, CDE

## 2018-01-19 NOTE — PROGRESS NOTES
conducted a Follow up consultation and Spiritual Assessment for Angel Mcgowan, who is a 81 y.o.,female. Patient's  is at the bedside. Patient states that \"isn't sure what's going on. \"     The  provided the following Interventions:  Continued the relationship of care and support. Listened empathically. Offered assurance of continued prayer on patients behalf. Chart reviewed. The following outcomes were achieved:  Patient expressed gratitude for Spiritual Care visit. Patient was reviewed in ICU Interdisciplinary Rounds. Assessment:  There are no further spiritual or Baptism issues which require Spiritual Care Services intervention at this time. Plan:  Chaplains will continue to follow and will provide pastoral care as needed or requested.  recommends bedside caregivers page the  on duty if patient shows signs of acute spiritual or emotional distress. [address] [id number].    Board Certified   [phone] - Office

## 2018-01-19 NOTE — INTERDISCIPLINARY ROUNDS
CRITICAL CARE INTERDISCIPLINARY ROUNDS    Patient Information:    Name:   Milly Prieto    Age:   81 y.o. Admission Date:   1/15/2018    Critical Care Day: 2    Surgery Date:      Attending Provider:   Caleb Alcala MD    Surgeon:       Consultant(s):   Dr Dee Bright Physician:  Dr Kevyn Grady    Code Status: Full Code    Problem List:     Patient Active Problem List   Diagnosis Code    Other and unspecified noninfectious gastroenteritis and colitis(558.9) K52.9    Rectal bleeding K62.5    HTN (hypertension) I10    Unspecified constipation K59.00    Constipation K59.00    Ischemic colitis ([address] K55.9    Diverticulosis of colon K57.30    Internal hemorrhoids K64.8    Cellulitis of abdominal wall L03.311    Cellulitis of groin L03.314    UTI (urinary tract infection) N39.0    Renal insufficiency N28.9    Sepsis ([address] A41.9    GERD (gastroesophageal reflux disease) K21.9    HLD (hyperlipidemia) E78.5    Moderate dehydration L78.9    Diastolic CHF, chronic (HCC) I50.32    SIRS (systemic inflammatory response syndrome) (HCC) R65.10    Abdominal wall cellulitis L03.311    Proctitis K62.89    Acute encephalopathy G93.40    Rash R21    Hypotension I95.9    Severe sepsis ([address] A41.9, R65.20       Principal Problem:  Cellulitis of abdominal wall    During rounds the following quality care indicators and evidence based practices were addressed :  DVT Prophylaxis and PUD Prophylaxis Gonzalez Day to be removed ([facility] y) ; Central Line Day:day 2 Isolation: Antibiotic Stewardship: Probiotics Necessary:     Sepsis Order Set:  or Elements of Sepsis Bundle Reviewed (Fluids, Antibiotics, Lactic Acid, Cultures, Vasopressors, Steriods, Glycemic control, Peak Plateau)    Glycemic Control:   Recent Labs      01/19/18   0452  01/18/18   0255  01/17/18   0437   GLU  110*  136*  83   ;No results for input(s): PHI, PCO2I, PO2I in the last 72 hours.     No lab exists for component: 3 Adjustments     Acute MI/PCI:   NA    Door to Balloon: Admission Time: 1726      Heart Failure:   NA     Pneumonia:        Interdisciplinary team rounds were held with the following team membersCare Management, Diabetes Treatment Specialist, Nursing, Nutrition, Pastoral Care, Pharmacy, Physician and Respiratory Therapy. Plan of care discussed. Goals of Care/ Recommendations: remove sheffield, electrolyte replacement. No CHG bath due to skin concerns. PT/OT monitor   See clinical pathway and/or care plan for interventions and desired outcomes.     Critical Care Discharge Status:  Red

## 2018-01-19 NOTE — PROGRESS NOTES
Hospitalist Progress Note-critical care note     Patient: Olegario Summers MRN: [id number]  CSN: [id number]    YOB: 1945  Age: 81 y.o. Sex: female    DOA: 1/15/2018 LOS:  LOS: 4 days            Chief complaint:sepsis , sandra on ckds , cellulitis , proctitis , hypotension, severe sepsis , acute encephalopathy     Assessment/Plan         Hospital Problems  Date Reviewed: 1/15/2018          Codes Class Noted POA    Acute encephalopathy ICD-10-CM: G93.40  ICD-9-CM: 348.30  1/18/2018 Unknown        Rash ICD-10-CM: R21  ICD-9-CM: 782.1  1/18/2018 Unknown        Hypotension ICD-10-CM: I95.9  ICD-9-CM: 458.9  1/18/2018 Unknown        Severe sepsis ([facility] ICD-10-CM: A41.9, R65.20  ICD-9-CM: 038.9, 995.92  1/18/2018 Unknown        Proctitis ICD-10-CM: K62.89  ICD-9-CM: 569.49  1/17/2018 Unknown        * (Principal)Cellulitis of abdominal wall ICD-10-CM: L03.311  ICD-9-CM: 682.2  1/15/2018 Unknown        Cellulitis of groin ICD-10-CM: L03.314  ICD-9-CM: 682.2  1/15/2018 Unknown        UTI (urinary tract infection) ICD-10-CM: N39.0  ICD-9-CM: 599.0  1/15/2018 Unknown        Renal insufficiency ICD-10-CM: N28.9  ICD-9-CM: 593.9  1/15/2018 Unknown        Sepsis ([facility] ICD-10-CM: A41.9  ICD-9-CM: 038.9, 995.91  1/15/2018 Unknown        GERD (gastroesophageal reflux disease) ICD-10-CM: K21.9  ICD-9-CM: 530.81  1/15/2018 Unknown        HLD (hyperlipidemia) ICD-10-CM: E78.5  ICD-9-CM: 272.4  1/15/2018 Unknown        Moderate dehydration ICD-10-CM: E86.0  ICD-9-CM: 276.51  1/15/2018 Unknown        Diastolic CHF, chronic (HCC) ICD-10-CM: I50.32  ICD-9-CM: 428.32, 428.0  1/15/2018 Unknown        SIRS (systemic inflammatory response syndrome) (HCC) ICD-10-CM: R65.10  ICD-9-CM: 995.90  1/15/2018 Unknown        Abdominal wall cellulitis ICD-10-CM: L03.311  ICD-9-CM: 682.2  1/15/2018 Unknown        HTN (hypertension) ICD-10-CM: I10  ICD-9-CM: 401.9  11/4/2013 Yes            1.  Severe Sepsis with associated Lactic Acidosis in the setting of fever, tachycardia, UTI, abdominal wall cellulitis  Wbc continue trending up   Echo pending   Repeated ct no fluid collection   Lactic acid is wnl, will d/c checking   Repeated bcx and urine cx negative     2. Hypotension   Improving, central line was placed   bp sti  Decreased fluid     2. Abdominal Wall cellulitis- bacterial and/or fungal   Continue abx and antifungal,   Wound care   Ct abdomen no abscess. 3. UTI  Continue abx   cx so far negative     4. Acute Kidney Injury on ckd3  Improving   will continue hydration and renal dose drug     5. Moderate Dehydration     6. Diastolic CHF, chronic, compensated   7. HTN  Continue home meds   8. GERD  ppi   9. HLD  Statin     11. ?Proctitis, not presented per repeated ct   12 acute encephalopathy   Improving     13 rash (poa)  Stable        Subjective: feel a little bit better   Nurse: no acute issue overnight        Review of systems:    General: No fevers or chills. Cardiovascular: No chest pain or pressure. No palpitations. Pulmonary: No shortness of breath. Gastrointestinal: No nausea, vomiting. Abdomen pain     Vital signs/Intake and Output:  Visit Vitals    BP (!) 116/94    Pulse 99    Temp 97 °F (36.1 °C)    Resp 25    Ht 4' 11\" (1.499 m)    Wt 78.4 kg (172 lb 13.5 oz)    SpO2 100%    BMI 34.91 kg/m2     Current Shift:  01/19 0701 - 01/19 1900  In: 1310 [P.O.:180; I.V.:1130]  Out: -   Last three shifts:  01/17 1901 - 01/19 0700  In: 3678.3 [P.O.:720; I.V.:2958.3]  Out: 1150 [Urine:1150]    Physical Exam:  General: WD, WN. Alert, cooperative, no acute distress    HEENT: NC, Atraumatic. PERRLA, anicteric sclerae. Lungs: CTA Bilaterally. No Wheezing/Rhonchi/Rales. Heart:  rrr,  No murmur, No Rubs, No Gallops  Abdomen: Soft, Non distended, Non tender.  +Bowel sounds, erythema /swelling-lower of abdomen   Extremities: No c/c/e  Psych:   Not anxious or agitated. Neurologic:  No acute neurological deficit.               Labs: Results:       Chemistry Recent Labs      01/19/18   0452  01/18/18   1320  01/18/18   0255  01/17/18   0437   GLU  110*   --   136*  83   NA  140   --   139  137   K  4.5   --   4.7  4.7   CL  112*   --   110*  108   CO2  18*   --   18*  17*   BUN  30*   --   34*  46*   CREA  1.16   --   1.37*  1.87*   CA  7.6*   --   7.5*  7.6*   AGAP  10   --   11  12   BUCR  26*   --   25*  25*   AP   --   123*   --    --    TP   --   3.5*   --    --    ALB   --   1.1*   --    --    GLOB   --   2.4   --    --    AGRAT   --   0.5*   --    --       CBC w/Diff Recent Labs      01/19/18   0452  01/18/18   0255   WBC  22.5*  19.5*   RBC  3.98*  4.86   HGB  12.0  14.5   HCT  35.8  44.5   PLT  208  196   GRANS  46  48   LYMPH  17*  14*   EOS  16*  3      Cardiac Enzymes No results for input(s): CPK, CKND1, CHRISTINA in the last 72 hours. No lab exists for component: CKRMB, TROIP   Coagulation Recent Labs      01/19/18   0452  01/18/18   1320   PTP  18.6*  22.0*   INR  1.6*  2.0*   APTT  45.6*  56.9*       Lipid Panel No results found for: CHOL, CHOLPOCT, CHOLX, CHLST, CHOLV, 149062, HDL, LDL, LDLC, DLDLP, 071687, VLDLC, VLDL, TGLX, TRIGL, TRIGP, TGLPOCT, CHHD, CHHDX   BNP No results for input(s): BNPP in the last 72 hours.    Liver Enzymes Recent Labs      01/18/18   1320   TP  3.5*   ALB  1.1*   AP  123*   SGOT  85*      Thyroid Studies No results found for: T4, T3U, TSH, TSHEXT, TSHEXT     Procedures/imaging: see electronic medical records for all procedures/Xrays and details which were not copied into this note but were reviewed prior to creation of Yazan Cruz MD

## 2018-01-19 NOTE — PROGRESS NOTES
GARIMA [address]affe [address] [facility] [address]  Phone [phone] Fax [phone]  Pulmonary Critical Care & Sleep Medicine       Name: Gaby Lorenzo MRN: [id number]   : 1945 Hospital: [facility] [place]   Date: 2018        PCCM Follow Up    Requesting MD:   Dr. Carli Burleson                                               Reason for CC Consult:Severe sepsis    IMPRESSION:   Patient Active Problem List   Diagnosis Code    Other and unspecified noninfectious gastroenteritis and colitis(558.9) K52.9    Rectal bleeding K62.5    HTN (hypertension) I10    Unspecified constipation K59.00    Constipation K59.00    Ischemic colitis ([facility] [address] K55.9    Diverticulosis of colon K57.30    Internal hemorrhoids K64.8    Cellulitis of abdominal wall L03.311    Cellulitis of groin L03.314    UTI (urinary tract infection) N39.0    Renal insufficiency N28.9    Sepsis ([facility] [address] A41.9    GERD (gastroesophageal reflux disease) K21.9    HLD (hyperlipidemia) E78.5    Moderate dehydration M96.1    Diastolic CHF, chronic (HCC) I50.32    SIRS (systemic inflammatory response syndrome) (HCC) R65.10    Abdominal wall cellulitis L03.311    Proctitis K62.89    Acute encephalopathy G93.40    Rash R21    Hypotension I95.9    Severe sepsis (HCC) A41.9, R65.20 ·   Possible pyelonephritis Abd wall cellulitis resulting in sepsis  · ? Rash and itching ? Allergy to medication . Bernadette Parr Did not receive any new medication. · Mild renal insufficiency due to ATN and hypotension  · Lactic acidosis due to sepsis   · Elevated INR ? Etiology ?  Early DIC vs poor po intake  · H/o Proctitis not seen on current CT  · Large Hiatal hernia     PLAN:  = Replace Mg and Phos  = Follow urine culture  = Change iv fluids to 1/2 ns as CL is going up and causing hypechloremic acidosis  -- Echo  -- Cardiac enz ok  -- oxygenation is ok  -- On vanco levaquin and azactam with fluconazole ... Adjust with culture. -- On  steroids and benadryl for itching and rash improving  -- SSI  -- PPI  -- Elevated INR not sure due to poor po intake or going in DIC. Ekevated D Dimer noted INR improved today  -- Central line placed due to poor access and low blood pressure  -- Patient is progressing well, due to worsening wbc and electrolyte imbalance will monitor one more day in ICU  -- As patient is doing well DC [place]. CVS:Hypotension most likely due to sepsis. Doing well Follow Echo. Cardiac enz is ok  RS:No issues with hypoxia. Aspiration precaution. Prn bronchodilators. ID:UTi and ABD wall cellulitis. Vanco since 11/17, Levaquin 11/17. Stop Doxy 11/18. Stop Flagyl 11/18. Diflucan since 11/17. Rpt urine culture. Follow up on Blood culture. ENDO:SSI   GI:Hiatal hernia. Continue PPI   RENAL:Monitor CR. K ok, MG and phos replaced  CNS:NO acute issue  HEMATOLOGY:elevated INR check fibrinogen and d dimer. PLT is ok. Overall doing well might be mild dic getting better  MUSCULOSKELETAL:No acute issue. Rash will give benadryl and steroids. PAIN AND SEDATION:no issue  · Skin/Wound: Rash management as above  · Electrolytes: Replace electrolytes per ICU electrolyte replacement protocol. · IVF: 1/2 ns at 50   · Nutrition: cont diet  · Prophylaxis: DVT Prophylaxis with SCDs not on heparin due to high inr,. GI Prophylaxis. · Restraints: none  · PT/OT eval and treat. OOB when appropriate. · Lines/Tubes: none. Right IJ 1/18/18  ADVANCE DIRECTIVE:Full code  FAMILY DISCUSSION:spoke with husbund and daughter. Updated patient today  Quality Care: PPI, DVT prophylaxis, HOB elevated, Infection control all reviewed and addressed. Events and notes from last 24 hours reviewed. Care plan discussed with nursing CC TIME:35 min    · Labs and images personally seen and available reports reviewed. · All current medicines are reviewed and doses and prescription adjusted. Subjective/History:   Mauricio Riddle is a 81 y.o. female who comes to the ed with complaints of abd pain. Patient states she has been having abd pain for the past week or so but it was very severe today therefore came to the ed. Due to this pain, she has not been eating and drinking much. She states pain is on the outside of the abd on the skin pain, its tender and warm to touch. It sharp and constant in nature with 10/10 when worst. No nausea vomiting and other complaints. Denies any fevers, chills, urinary symptoms at home. Does report of dry mouth. Other medical conditions stable at this time and takes her meds as prescribed. In the ED she was noted to have elevated WBC, HR and Cr. CT A/P done did not show any acute findings. On physical exam she was noted to have erythematous region of her lower abd.     1/18/18  Patient was admitted to [facility]  Over period of time in am started becoming hypotensive  Patient also complained about rash all over roman and itching  Not been started on any new meds  ABD ct was repeated showed cellulitis and possible left pararenal fluid suggestive of pyelonephritis  Lactate 3.5  INR 2 ?  Not on coumadin  PLT is normal  Recent CT showed proctitis on Jan 1st  Patient is Diabetic  Denies any diarrhoes just not feeling well     1/19  Doing well  No new issues  BP remained stable overnight  Lactate improved to 1.9  WBC is going up to 22  HB is stable at 12  Renal function improved to 1.16  Rash is also improved   No fever, Phor 1.3 and mg 1.8         Past Medical History:   Diagnosis Date    Arthritis     Blood clot in vein 2004    left leg     Breast cancer ([facility] [address]     Cancer ([facility] [address]     right breast    GERD (gastroesophageal reflux disease)     High cholesterol     Hypertension     Other ill-defined conditions(799.89)     high cholestrol    Vertigo       Past Surgical History:   Procedure Laterality Date    ABDOMEN SURGERY PROC UNLISTED      removal of tumor abdomen    HX BREAST LUMPECTOMY      HX GI      colonosocpy    HX KNEE REPLACEMENT      HX ORTHOPAEDIC      total knee replacement bilateral    HX ORTHOPAEDIC      excision of cyst left hand    HX TUBAL LIGATION      HX TUMOR REMOVAL        Prior to Admission medications    Medication Sig Start Date End Date Taking? Authorizing Provider   sucralfate (CARAFATE) 1 gram tablet Take 1 Tab by mouth four (4) times daily. 1/1/17  Yes Kartik Sahni MD   nortriptyline (PAMELOR) 25 mg capsule Take 25 mg by mouth nightly. Yes Ghada Solitario MD   lovastatin (MEVACOR) 40 mg tablet Take 40 mg by mouth nightly. Yes Ghada Solitario MD   cyclobenzaprine (FLEXERIL) 10 mg tablet Take 10 mg by mouth three (3) times daily as needed for Muscle Spasm(s). Yes Ghada Solitario MD   aspirin (ASPIRIN) 325 mg tablet Take 325 mg by mouth daily. Yes Ghada Solitario MD   losartan (COZAAR) 100 mg tablet Take 100 mg by mouth daily. Yes Ghada Solitario MD   famotidine (PEPCID) 20 mg tablet Take 20 mg by mouth two (2) times a day. Yes Ghada Solitario MD   ezetimibe (ZETIA) 10 mg tablet Take 10 mg by mouth nightly.    Yes Ghada Solitario MD     Current Facility-Administered Medications   Medication Dose Route Frequency    0.45% sodium chloride infusion  50 mL/hr IntraVENous CONTINUOUS    sodium bicarbonate tablet 325 mg  325 mg Oral BID    vancomycin (VANCOCIN) 750 mg in dextrose 5% 250 mL IVPB  750 mg IntraVENous Q24H    famotidine (PEPCID) tablet 20 mg  20 mg Oral DAILY    methylPREDNISolone (PF) (SOLU-MEDROL) injection 40 mg  40 mg IntraVENous DAILY    aztreonam (AZACTAM) 1 g in sterile water (preservative free) 10 mL IV syringe  1 g IntraVENous Q8H    insulin lispro (HUMALOG) injection   SubCUTAneous AC&HS    levoFLOXacin (LEVAQUIN) 750 mg in D5W IVPB  750 mg IntraVENous Q48H    aspirin (ASPIRIN) tablet 325 mg  325 mg Oral DAILY    nortriptyline (PAMELOR) capsule 25 mg  25 mg Oral QHS    sucralfate (CARAFATE) tablet 1 g  1 g Oral QID    nystatin (MYCOSTATIN) 100,000 unit/gram powder   Topical BID    fluconazole (DIFLUCAN) tablet 200 mg  200 mg Oral DAILY    Vancomycin - Pharmacy to dose  1 Each Other Rx Dosing/Monitoring     Allergies   Allergen Reactions    Amiloride Nausea and Vomiting    Amoxicillin Rash    Bactrim [Sulfamethoprim Ds] Unknown (comments)    Gabapentin Rash    Ibuprofen Unknown (comments)    Zestoretic [Lisinopril-Hydrochlorothiazide] Unknown (comments)      Social History   Substance Use Topics    Smoking status: Passive Smoke Exposure - Never Smoker    Smokeless tobacco: Never Used    Alcohol use No      History reviewed. No pertinent family history. Objective:   Vital Signs:    Visit Vitals    BP (!) 116/94    Pulse 99    Temp 97.5 °F (36.4 °C)    Resp 25    Ht 4' 11\" (1.499 m)    Wt 78.4 kg (172 lb 13.5 oz)    SpO2 100%    BMI 34.91 kg/m2       O2 Device: Room air       Temp (24hrs), Av.9 °F (36.6 °C), Min:97.2 °F (36.2 °C), Max:98.5 °F (36.9 °C)       Intake/Output:   Last shift:       07 - 1900  In: 1310 [P.O.:180; I.V.:1130]  Out: -   Last 3 shifts:  190 -  0700  In: 3678.3 [P.O.:720;  I.V.:2958.3]  Out: 1150 [Urine:1150]    Intake/Output Summary (Last 24 hours) at 18 1002  Last data filed at 18 0859   Gross per 24 hour   Intake          4268.33 ml   Output              350 ml   Net          3918.33 ml       Review of Systems:  HEENT: No epistaxis, no nasal drainage, no difficulty in swallowing, no redness in eyes  Respiratory: as above  Cardiovascular: no chest pain, no palpitations, no chronic leg edema, no syncope  Gastrointestinal: no abd pain, no vomiting, no diarrhea, no bleeding symptoms  Genitourinary: No urinary symptoms or hematuria  Integument/breast: No ulcers or rashes  Musculoskeletal:Neg  Neurological: No focal weakness, no seizures, no headaches  Behvioral/Psych: No anxiety, no depression  Constitutional: No fever, no chills, no weight loss, no night sweats     Objective:    General: comfortable, no acute distress  HEENT: pupils reactive, sclera anicteric, EOM intact  Neck: No adenopathy or thyroid swelling, no lymphadenopathy or JVD, supple  CVS: S1S2 no murmurs  RS: Mod AE bilaterally, No rales no wheeze  Abd: soft, tenderness on right lower abd  Neuro: non focal, awake, alert  Extrm: no leg edema, clubbing or cyanosis  Lymph node: No obvious palpable lymph node appreciated. Skin: Diffuse rash all over roman  CBC w/Diff Recent Labs      01/19/18   0452  01/18/18   0255   WBC  22.5*  19.5*   RBC  3.98*  4.86   HGB  12.0  14.5   HCT  35.8  44.5   PLT  208  196   GRANS  46  48   LYMPH  17*  14*   EOS  16*  3        Chemistry Recent Labs      01/19/18   0452  01/18/18   1925  01/18/18   1320  01/18/18   0255  01/17/18   0437   GLU  110*   --    --   136*  83   NA  140   --    --   139  137   K  4.5   --    --   4.7  4.7   CL  112*   --    --   110*  108   CO2  18*   --    --   18*  17*   BUN  30*   --    --   34*  46*   CREA  1.16   --    --   1.37*  1.87*   CA  7.6*   --    --   7.5*  7.6*   MG  1.8   --    --   2.0   --    PHOS  2.6  1.3*   --    --    --    AGAP  10   --    --   11  12   BUCR  26*   --    --   25*  25*   AP   --    --   123*   --    --    TP   --    --   3.5*   --    --    ALB   --    --   1.1*   --    --    GLOB   --    --   2.4   --    --    AGRAT   --    --   0.5*   --    --         Lactic Acid Lactic acid   Date Value Ref Range Status   01/19/2018 1.9 0.4 - 2.0 MMOL/L Final     Recent Labs      01/19/18   0452  01/18/18   2344  01/18/18   1710   LAC  1.9  2.0  2.8*        Micro  Recent Labs      01/18/18   1320  01/18/18   1315   CULT  NO GROWTH AFTER 17 HOURS  NO GROWTH AFTER 17 HOURS     Recent Labs      01/18/18   1320  01/18/18   1315   CULT  NO GROWTH AFTER 17 HOURS  NO GROWTH AFTER 17 HOURS        ABG No results for input(s): PHI, PHI, POC2, PCO2I, PO2, PO2I, HCO3, HCO3I, FIO2, FIO2I in the last 72 hours.      Liver Enzymes Protein, total   Date Value Ref Range Status   01/18/2018 3.5 (L) 6.4 - 8.2 g/dL Final     Albumin   Date Value Ref Range Status   01/18/2018 1.1 (L) 3.4 - 5.0 g/dL Final     Globulin   Date Value Ref Range Status   01/18/2018 2.4 2.0 - 4.0 g/dL Final     A-G Ratio   Date Value Ref Range Status   01/18/2018 0.5 (L) 0.8 - 1.7   Final     AST (SGOT)   Date Value Ref Range Status   01/18/2018 85 (H) 15 - 37 U/L Final     Alk. phosphatase   Date Value Ref Range Status   01/18/2018 123 (H) 45 - 117 U/L Final     Recent Labs      01/18/18   1320   TP  3.5*   ALB  1.1*   GLOB  2.4   AGRAT  0.5*   SGOT  85*   AP  123*        Cardiac Enzymes Lab Results   Component Value Date/Time    TROIQ <0.02 01/18/2018 01:20 PM        BNP No results found for: BNP, BNPP, XBNPT     Coagulation Recent Labs      01/19/18   0452  01/18/18   1320   PTP  18.6*  22.0*   INR  1.6*  2.0*   APTT  45.6*  56.9*         Thyroid  No results found for: T4, T3U, TSH, TSHEXT, TSHEXT       Lipid Panel No results found for: CHOL, CHOLPOCT, CHOLX, CHLST, CHOLV, 972974, HDL, LDL, LDLC, DLDLP, 531795, VLDLC, VLDL, TGLX, TRIGL, TRIGP, TGLPOCT, CHHD, CHHDX       Urinalysis Lab Results   Component Value Date/Time    Color DARK YELLOW 01/15/2018 07:50 PM    Appearance CLOUDY 01/15/2018 07:50 PM    Specific gravity 1.026 01/15/2018 07:50 PM    pH (UA) 5.0 01/15/2018 07:50 PM    Protein TRACE 01/15/2018 07:50 PM    Glucose NEGATIVE  01/15/2018 07:50 PM    Ketone TRACE 01/15/2018 07:50 PM    Bilirubin MODERATE 01/15/2018 07:50 PM    Urobilinogen 1.0 01/15/2018 07:50 PM    Nitrites POSITIVE 01/15/2018 07:50 PM    Leukocyte Esterase SMALL 01/15/2018 07:50 PM    Epithelial cells FEW 01/15/2018 07:50 PM    Bacteria 1+ 01/15/2018 07:50 PM    WBC 0 to 3 01/15/2018 07:50 PM    RBC 0 to 1 01/15/2018 07:50 PM        XR (Most Recent).  CXR reviewed by me and compared with previous CXR   Results from Hospital Encounter encounter on 01/15/18   XR CHEST PORT   Narrative EXAM: Portable upright chest, one view    INDICATION: Hypoxia    COMPARISON: Chest x-ray 1/18/2018, CT abdomen 1/18/2018    _______________    FINDINGS:    Right jugular central venous catheter tip projects at the distal SVC. Cardiac silhouette and pulmonary vascularity are normal. Slight interval  improvement of right basilar subsegmental atelectasis. Eventration of the right  hemidiaphragm is unchanged. Hazy opacity is present across the left hemithorax  base without change most or all of which is secondary to the patient's known  hiatal hernia. Adjacent atelectasis not excluded.    _______________         Impression IMPRESSION:    Slight interval improvement of right basilar subsegmental atelectasis. Hiatal  hernia plus or minus adjacent left basilar atelectasis without change. CT (Most Recent)   Results from Hospital Encounter encounter on 01/15/18   CT ABD PELV WO CONT   Narrative EXAM: CT of the Abdomen and Pelvis    INDICATION: Abdominal wall cellulitis, questionable abscess formation, patient  still febrile. COMPARISON: 01/15/2018    TECHNIQUE: Axial CT imaging of the abdomen and pelvis was performed without  intravenous contrast. Multiplanar reformats were generated. Dose reduction  techniques used: Automated exposure control, adjustment of the mAs and/or kVp  according to patient's size, and iterative reconstruction techniques. _______________    FINDINGS:    LOWER CHEST: Right middle lobe and left lower lung persistent atelectasis. No  consolidation or pleural effusion. No convincing pericardial effusion. Large  hiatus hernia, similar to prior exam.. LIVER, BILIARY: Noncontrast evaluation of the liver demonstrates an unchanged  stable hypodensity in the left lobe, too small to characterize, probably benign. No acute or interval change. No biliary dilation. Gallbladder is unremarkable. PANCREAS: No acute abnormality. SPLEEN: Normal.    ADRENALS: Normal.    KIDNEYS/URETERS/BLADDER: No findings of hydronephrosis.  Interval developing mild  left pararenal stranding and a tiny amount of fluid. No hydroureter. Decompressed bladder with Gonzalez catheter in place. LYMPH NODES: No enlarged lymph nodes. GASTROINTESTINAL TRACT: No bowel dilation or wall thickening. PELVIC ORGANS: Multiple coarse calcification stations within the uterus, in  keeping with degenerative leiomyoma. VASCULATURE: Atherosclerotic vascular calcification of the abdominal aorta  without aneurysm. BONES: No acute or aggressive osseous abnormalities identified. Stable CT  appearance of multilevel severe degenerative disc disease notably at T12-L2,  L4-5 and L5-S1. Stable endplate degenerative sclerosis and irregularity prior  exams with degenerative partial vertebral body fusion at L1-2. Stable minimal  grade 1 anterolisthesis of L4/5. Severe left hip DJD. OTHER:   -Trace amount of fluid in the upper left paracolic gutter with minimal left  pararenal fascial thickening.  -Interval developing left greater the right soft tissue anasarca with left  abdominal to pelvic dermal thickening with soft tissue stranding.    _______________         Impression IMPRESSION:    1. Nonspecific CT appearance of interval mild developing left pararenal  stranding, tiny amount of perinephric fluid, small amount of fluid in the upper  left paracolic gutter with localized left perirenal fascial thickening. Diagnostic considerations may include infectious etiology such as left-sided  pyelonephritis. Recommend clinical correlation to include urinalysis. 2. Developing soft tissue anasarca, left greater the right with nonspecific left  abdominal to pelvic dermal thickening with soft tissue stranding. Diagnostic  considerations include cellulitis and/or nonspecific edema. Otherwise, no  discrete organized fluid collection on this noncontrast exam.               EKG No results found for this or any previous visit. ECHO No results found for this or any previous visit.      PFT No flowsheet data found. Other ASA reactivity:   Pre-albumin:   Ionized Calcium:   NH4:   T3, FT4:  Cortisol:  Urine Osm:  Urine Lytes:   HbA1c:          Imaging:  I have personally reviewed the patients radiographs and have reviewed the reports:     Best practice :  Fluids started at 30 ml/kg with aim to keep CVP 8 or above  Lactic acid ordered- initial and repeat Q6hrs if elevated till normalized. Cultures drawn. Antibiotic administered within 1hr-ICU  And 3hrs ED  Pressors aim MAP >65mmHg  Steriods  Glycemic control  Mech. Ventilated patients- aim to keep peak plateau pressure 71-57HB H2O. Sress ulcer prophylaxis  DVT prophylaxis    Vent bundle, Gonzalez bundle and central line bundle followed. Basia Pierson M.D.   Pulmonary Critical Care & Sleep Medicine

## 2018-01-20 LAB
ANION GAP SERPL CALC-SCNC: 10 MMOL/L (ref 3–18)
ATRIAL RATE: 142 BPM
BACTERIA SPEC CULT: NORMAL
BASOPHILS # BLD: 0 K/UL (ref 0–0.1)
BASOPHILS NFR BLD: 0 % (ref 0–3)
BUN SERPL-MCNC: 31 MG/DL (ref 7–18)
BUN/CREAT SERPL: 30 (ref 12–20)
CALCIUM SERPL-MCNC: 7.8 MG/DL (ref 8.5–10.1)
CALCULATED P AXIS, ECG09: 46 DEGREES
CALCULATED R AXIS, ECG10: 17 DEGREES
CALCULATED T AXIS, ECG11: 42 DEGREES
CHLORIDE SERPL-SCNC: 108 MMOL/L (ref 100–108)
CO2 SERPL-SCNC: 21 MMOL/L (ref 21–32)
CREAT SERPL-MCNC: 1.04 MG/DL (ref 0.6–1.3)
DATE LAST DOSE: ABNORMAL
DIAGNOSIS, 93000: NORMAL
DIFFERENTIAL METHOD BLD: ABNORMAL
EOSINOPHIL # BLD: 1.6 K/UL (ref 0–0.4)
EOSINOPHIL NFR BLD: 6 % (ref 0–5)
ERYTHROCYTE [DISTWIDTH] IN BLOOD BY AUTOMATED COUNT: 15.4 % (ref 11.6–14.5)
GLUCOSE BLD STRIP.AUTO-MCNC: 101 MG/DL (ref 70–110)
GLUCOSE BLD STRIP.AUTO-MCNC: 106 MG/DL (ref 70–110)
GLUCOSE BLD STRIP.AUTO-MCNC: 119 MG/DL (ref 70–110)
GLUCOSE BLD STRIP.AUTO-MCNC: 125 MG/DL (ref 70–110)
GLUCOSE SERPL-MCNC: 124 MG/DL (ref 74–99)
HCT VFR BLD AUTO: 34.6 % (ref 35–45)
HGB BLD-MCNC: 11.7 G/DL (ref 12–16)
LYMPHOCYTES # BLD: 10.1 K/UL (ref 0.8–3.5)
LYMPHOCYTES NFR BLD: 37 % (ref 20–51)
MAGNESIUM SERPL-MCNC: 1.9 MG/DL (ref 1.6–2.6)
MCH RBC QN AUTO: 29.6 PG (ref 24–34)
MCHC RBC AUTO-ENTMCNC: 33.8 G/DL (ref 31–37)
MCV RBC AUTO: 87.6 FL (ref 74–97)
MONOCYTES # BLD: 0.5 K/UL (ref 0–1)
MONOCYTES NFR BLD: 2 % (ref 2–9)
NEUTS BAND NFR BLD MANUAL: 7 % (ref 0–5)
NEUTS SEG # BLD: 13.1 K/UL (ref 1.8–8)
NEUTS SEG NFR BLD: 48 % (ref 42–75)
P-R INTERVAL, ECG05: 140 MS
PHOSPHATE SERPL-MCNC: 2.1 MG/DL (ref 2.5–4.9)
PLATELET # BLD AUTO: 195 K/UL (ref 135–420)
PLATELET COMMENTS,PCOM: ABNORMAL
PMV BLD AUTO: 9.7 FL (ref 9.2–11.8)
POTASSIUM SERPL-SCNC: 3.9 MMOL/L (ref 3.5–5.5)
Q-T INTERVAL, ECG07: 254 MS
QRS DURATION, ECG06: 70 MS
QTC CALCULATION (BEZET), ECG08: 390 MS
RBC # BLD AUTO: 3.95 M/UL (ref 4.2–5.3)
RBC MORPH BLD: ABNORMAL
REPORTED DOSE,DOSE: ABNORMAL UNITS
REPORTED DOSE/TIME,TMG: 1707
SERVICE CMNT-IMP: NORMAL
SODIUM SERPL-SCNC: 139 MMOL/L (ref 136–145)
VANCOMYCIN TROUGH SERPL-MCNC: 6.8 UG/ML (ref 10–20)
VENTRICULAR RATE, ECG03: 142 BPM
WBC # BLD AUTO: 27.2 K/UL (ref 4.6–13.2)
WBC MORPH BLD: ABNORMAL

## 2018-01-20 PROCEDURE — 85025 COMPLETE CBC W/AUTO DIFF WBC: CPT | Performed by: HOSPITALIST

## 2018-01-20 PROCEDURE — 74011000250 HC RX REV CODE- 250: Performed by: INTERNAL MEDICINE

## 2018-01-20 PROCEDURE — 74011250636 HC RX REV CODE- 250/636: Performed by: INTERNAL MEDICINE

## 2018-01-20 PROCEDURE — 74011250637 HC RX REV CODE- 250/637: Performed by: HOSPITALIST

## 2018-01-20 PROCEDURE — 83735 ASSAY OF MAGNESIUM: CPT | Performed by: HOSPITALIST

## 2018-01-20 PROCEDURE — 74011250637 HC RX REV CODE- 250/637: Performed by: INTERNAL MEDICINE

## 2018-01-20 PROCEDURE — 82962 GLUCOSE BLOOD TEST: CPT

## 2018-01-20 PROCEDURE — 84100 ASSAY OF PHOSPHORUS: CPT | Performed by: HOSPITALIST

## 2018-01-20 PROCEDURE — 80202 ASSAY OF VANCOMYCIN: CPT | Performed by: INTERNAL MEDICINE

## 2018-01-20 PROCEDURE — 97530 THERAPEUTIC ACTIVITIES: CPT

## 2018-01-20 PROCEDURE — 80048 BASIC METABOLIC PNL TOTAL CA: CPT | Performed by: HOSPITALIST

## 2018-01-20 PROCEDURE — 97116 GAIT TRAINING THERAPY: CPT

## 2018-01-20 PROCEDURE — 65660000000 HC RM CCU STEPDOWN

## 2018-01-20 RX ORDER — LOSARTAN POTASSIUM 50 MG/1
100 TABLET ORAL DAILY
Status: DISCONTINUED | OUTPATIENT
Start: 2018-01-20 | End: 2018-01-24

## 2018-01-20 RX ORDER — VANCOMYCIN HCL IN 5 % DEXTROSE 1.25 G/25
1250 PLASTIC BAG, INJECTION (ML) INTRAVENOUS EVERY 24 HOURS
Status: DISCONTINUED | OUTPATIENT
Start: 2018-01-20 | End: 2018-01-25 | Stop reason: DRUGHIGH

## 2018-01-20 RX ORDER — HYDRALAZINE HYDROCHLORIDE 20 MG/ML
10 INJECTION INTRAMUSCULAR; INTRAVENOUS
Status: DISCONTINUED | OUTPATIENT
Start: 2018-01-20 | End: 2018-01-26 | Stop reason: HOSPADM

## 2018-01-20 RX ORDER — SODIUM,POTASSIUM PHOSPHATES 280-250MG
2 POWDER IN PACKET (EA) ORAL
Status: COMPLETED | OUTPATIENT
Start: 2018-01-20 | End: 2018-01-20

## 2018-01-20 RX ADMIN — POTASSIUM & SODIUM PHOSPHATES POWDER PACK 280-160-250 MG 2 PACKET: 280-160-250 PACK at 11:39

## 2018-01-20 RX ADMIN — SODIUM BICARBONATE 650 MG TABLET 325 MG: at 09:51

## 2018-01-20 RX ADMIN — NYSTATIN: 100000 POWDER TOPICAL at 21:00

## 2018-01-20 RX ADMIN — AZTREONAM 1 G: 1 INJECTION, POWDER, LYOPHILIZED, FOR SOLUTION INTRAMUSCULAR; INTRAVENOUS at 01:28

## 2018-01-20 RX ADMIN — SUCRALFATE 1 G: 1 TABLET ORAL at 14:28

## 2018-01-20 RX ADMIN — VANCOMYCIN HYDROCHLORIDE 1750 MG: 10 INJECTION, POWDER, LYOPHILIZED, FOR SOLUTION INTRAVENOUS at 20:32

## 2018-01-20 RX ADMIN — NYSTATIN: 100000 POWDER TOPICAL at 09:56

## 2018-01-20 RX ADMIN — SODIUM BICARBONATE 650 MG TABLET 325 MG: at 20:32

## 2018-01-20 RX ADMIN — LOSARTAN POTASSIUM 100 MG: 50 TABLET ORAL at 09:53

## 2018-01-20 RX ADMIN — POTASSIUM & SODIUM PHOSPHATES POWDER PACK 280-160-250 MG 2 PACKET: 280-160-250 PACK at 09:51

## 2018-01-20 RX ADMIN — SUCRALFATE 1 G: 1 TABLET ORAL at 18:14

## 2018-01-20 RX ADMIN — ASPIRIN 325 MG ORAL TABLET 325 MG: 325 PILL ORAL at 09:52

## 2018-01-20 RX ADMIN — FAMOTIDINE 20 MG: 20 TABLET ORAL at 09:53

## 2018-01-20 RX ADMIN — SUCRALFATE 1 G: 1 TABLET ORAL at 20:33

## 2018-01-20 RX ADMIN — NORTRIPTYLINE HYDROCHLORIDE 25 MG: 25 CAPSULE ORAL at 20:33

## 2018-01-20 RX ADMIN — SUCRALFATE 1 G: 1 TABLET ORAL at 09:52

## 2018-01-20 NOTE — PROGRESS NOTES
1915- Report and care received, assessment completed per flow sheet. Alert, oriented, cooperative, NAD. Denies pain or SOB. IVF infusing via IV pump without difficulty. Reports some itching of right arm, benadryl topical cream applied per orders. Diffuse red rash noted and without change per day nurse, facial dryness and some crusting present as well.     1922- Daughter in to see. 2300- Reassessment completed and without change. 0400- Reassessment completed and without change.

## 2018-01-20 NOTE — PROGRESS NOTES
Hospitalist Progress Note-critical care note     Patient: Angel Mcgowan MRN: [id number]  CSN: [id number]    YOB: 1945  Age: 81 y.o. Sex: female    DOA: 1/15/2018 LOS:  LOS: 5 days            Chief complaint:sepsis , sandra on ckds , cellulitis , proctitis , hypotension, severe sepsis , acute encephalopathy     Assessment/Plan         Hospital Problems  Date Reviewed: 1/15/2018          Codes Class Noted POA    Acute encephalopathy ICD-10-CM: G93.40  ICD-9-CM: 348.30  1/18/2018 Unknown        Rash ICD-10-CM: R21  ICD-9-CM: 782.1  1/18/2018 Unknown        Hypotension ICD-10-CM: I95.9  ICD-9-CM: 458.9  1/18/2018 Unknown        Severe sepsis ([facility] [address] ICD-10-CM: A41.9, R65.20  ICD-9-CM: 038.9, 995.92  1/18/2018 Unknown        Proctitis ICD-10-CM: K62.89  ICD-9-CM: 569.49  1/17/2018 Unknown        * (Principal)Cellulitis of abdominal wall ICD-10-CM: L03.311  ICD-9-CM: 682.2  1/15/2018 Unknown        Cellulitis of groin ICD-10-CM: L03.314  ICD-9-CM: 682.2  1/15/2018 Unknown        UTI (urinary tract infection) ICD-10-CM: N39.0  ICD-9-CM: 599.0  1/15/2018 Unknown        Renal insufficiency ICD-10-CM: N28.9  ICD-9-CM: 593.9  1/15/2018 Unknown        Sepsis ([facility] [address] ICD-10-CM: A41.9  ICD-9-CM: 038.9, 995.91  1/15/2018 Unknown        GERD (gastroesophageal reflux disease) ICD-10-CM: K21.9  ICD-9-CM: 530.81  1/15/2018 Unknown        HLD (hyperlipidemia) ICD-10-CM: E78.5  ICD-9-CM: 272.4  1/15/2018 Unknown        Moderate dehydration ICD-10-CM: E86.0  ICD-9-CM: 276.51  1/15/2018 Unknown        Diastolic CHF, chronic (HCC) ICD-10-CM: I50.32  ICD-9-CM: 428.32, 428.0  1/15/2018 Unknown        SIRS (systemic inflammatory response syndrome) (HCC) ICD-10-CM: R65.10  ICD-9-CM: 995.90  1/15/2018 Unknown        Abdominal wall cellulitis ICD-10-CM: B49.962  ICD-9-CM: 682.2  1/15/2018 Unknown        HTN (hypertension) ICD-10-CM: I10  ICD-9-CM: 401.9  11/4/2013 Yes            1.  Severe Sepsis with associated Lactic Acidosis in the setting of fever, UTI, abdominal wall cellulitis and cellulitis from groining area   Wbc continue trending up   Echo : ef wnl no vegetation noted   Repeated ct no fluid collection   Repeated bcx so far negative     2. Hypotension -now htn   Hypotensive resolved and will restart home medication for htn         2. Abdominal Wall cellulitis- bacterial and/or fungal and cellulitis of groin   Improving of abdomen wall site  Groin site mild improving also   Continue abx and antifungal,   Wound care   Ct abdomen no abscess. 3. UTI  Continue abx   cx so far negative     4. Acute Kidney Injury on ckd3  Cr back to base line   5. Moderate Dehydration   6. Diastolic CHF, chronic, compensated   7. HTN  Restart home medication   8. GERD  ppi   9. HLD  Statin     11. ?Proctitis, not presented per repeated ct   12 acute encephalopathy   Back to her baseline     13 rash (poa)  Improving,        Subjective: may I go home   Nurse: no acute issue       Review of systems:    General: No fevers or chills. Cardiovascular: No chest pain or pressure. No palpitations. Pulmonary: No shortness of breath. Gastrointestinal: No nausea, vomiting. Vital signs/Intake and Output:  Visit Vitals    /86    Pulse 67    Temp 97.5 °F (36.4 °C)    Resp 9    Ht 4' 11\" (1.499 m)    Wt 82 kg (180 lb 12.4 oz)    SpO2 100%    BMI 36.51 kg/m2     Current Shift:  01/20 0701 - 01/20 1900  In: -   Out: 300 [Urine:300]  Last three shifts:  01/18 1901 - 01/20 0700  In: 3532.9 [P.O.:360; I.V.:3172.9]  Out: 1752 [Urine:1750]    Physical Exam:  General: WD, WN. Alert, cooperative, no acute distress    HEENT: NC, Atraumatic. PERRLA, anicteric sclerae. Lungs: CTA Bilaterally. No Wheezing/Rhonchi/Rales.   Heart:  rrr,  No murmur, No Rubs, No Gallops  Abdomen: Soft, Non distended, Non tender.  +Bowel sounds, erythema /swelling-lower of abdomen improving, no tenderness  , blisters noted on rt thigh and tenderness   Extremities: No c/c/e  Psych:   Not anxious or agitated. Neurologic:  No acute neurological deficit. Derm: peeling skin noted on face         Labs: Results:       Chemistry Recent Labs      01/20/18   0605  01/19/18   0452  01/18/18   1320  01/18/18   0255   GLU  124*  110*   --   136*   NA  139  140   --   139   K  3.9  4.5   --   4.7   CL  108  112*   --   110*   CO2  21  18*   --   18*   BUN  31*  30*   --   34*   CREA  1.04  1.16   --   1.37*   CA  7.8*  7.6*   --   7.5*   AGAP  10  10   --   11   BUCR  30*  26*   --   25*   AP   --    --   123*   --    TP   --    --   3.5*   --    ALB   --    --   1.1*   --    GLOB   --    --   2.4   --    AGRAT   --    --   0.5*   --       CBC w/Diff Recent Labs      01/20/18   0605 01/19/18   0452  01/18/18   0255   WBC  27.2*  22.5*  19.5*   RBC  3.95*  3.98*  4.86   HGB  11.7*  12.0  14.5   HCT  34.6*  35.8  44.5   PLT  195  208  196   GRANS  48  46  48   LYMPH  37  17*  14*   EOS  6*  16*  3      Cardiac Enzymes No results for input(s): CPK, CKND1, CHRISTINA in the last 72 hours. No lab exists for component: CKRMB, TROIP   Coagulation Recent Labs      01/19/18   0452  01/18/18   1320   PTP  18.6*  22.0*   INR  1.6*  2.0*   APTT  45.6*  56.9*       Lipid Panel No results found for: CHOL, CHOLPOCT, CHOLX, CHLST, CHOLV, 450784, HDL, LDL, LDLC, DLDLP, 935939, VLDLC, VLDL, TGLX, TRIGL, TRIGP, TGLPOCT, CHHD, CHHDX   BNP No results for input(s): BNPP in the last 72 hours.    Liver Enzymes Recent Labs      01/18/18   1320   TP  3.5*   ALB  1.1*   AP  123*   SGOT  85*      Thyroid Studies No results found for: T4, T3U, TSH, TSHEXT, TSHEXT     Procedures/imaging: see electronic medical records for all procedures/Xrays and details which were not copied into this note but were reviewed prior to creation of Kimani Wade MD

## 2018-01-20 NOTE — PROGRESS NOTES
[id number] Patient arrived on unit transferred from ICU via wheelchair to room 336.    1500 Patient had a uneventful shift since transferred from ICU.

## 2018-01-20 NOTE — ROUTINE PROCESS
Bedside and Verbal shift change report given to Zabrina Romeo RN (oncoming nurse) by Contreras Landaverde RN (offgoing nurse). Report included the following information SBAR, Kardex, Intake/Output, MAR, Recent Results, Med Rec Status and Cardiac Rhythm SR.     1900--Patient lost IV access. Unable to give 1250mg IV dose of Vanco. I did attempt to stick the patient twice without success. Removed IVL to RAC. 1915--Bedside and Verbal shift change report given to Dano Correa RN (oncoming nurse) by Zabrina Romeo RN (offgoing nurse). Report included the following information SBAR, Kardex, Intake/Output, MAR, Recent Results, Med Rec Status and Cardiac Rhythm SR.     1917--Paged on call hospitalist for orders about IV abx.    1924--Spoke c Dr. Lovely Palacios. No new orders for IV abx changes. Suggested we continue to try to obtain IV access. Will inform PM RN.

## 2018-01-20 NOTE — ROUTINE PROCESS
Bedside and Verbal shift change report given to JARED Conner (oncoming nurse) [facility] Report included the following information SBAR, Kardex, Intake/Output, MAR and Recent Results.

## 2018-01-20 NOTE — PROGRESS NOTES
Hilda Gamboa M.D  [address]  Phone [phone] Fax [phone]  Pulmonary Critical Care & Sleep Medicine       Name: Hector Abbott MRN: [id number]   : 1945 Hospital: [facility]   Date: 2018        PCCM Follow Up    Requesting MD:   Dr. Dexter Grady                                               Reason for CC Consult:Severe sepsis    IMPRESSION:   Patient Active Problem List   Diagnosis Code    Other and unspecified noninfectious gastroenteritis and colitis(558.9) K52.9    Rectal bleeding K62.5    HTN (hypertension) I10    Unspecified constipation K59.00    Constipation K59.00    Ischemic colitis ([facility] [address] K55.9    Diverticulosis of colon K57.30    Internal hemorrhoids K64.8    Cellulitis of abdominal wall L03.311    Cellulitis of groin L03.314    UTI (urinary tract infection) N39.0    Renal insufficiency N28.9    Sepsis ([address] A41.9    GERD (gastroesophageal reflux disease) K21.9    HLD (hyperlipidemia) E78.5    Moderate dehydration K04.4    Diastolic CHF, chronic (HCC) I50.32    SIRS (systemic inflammatory response syndrome) ([facility]) R65.10    Abdominal wall cellulitis L03.311    Proctitis K62.89    Acute encephalopathy G93.40    Rash R21    Hypotension I95.9    Severe sepsis ([facility]) A41.9, R65.20 ·   Possible pyelonephritis Abd wall cellulitis resulting in sepsis  · ? Rash and itching ? Allergy to medication . Corrinne Limestone Did not receive any new medication. · Mild renal insufficiency due to ATN and hypotension improved  · Lactic acidosis due to sepsis improved  · Elevated INR ? Etiology ? Early DIC vs poor po intake improved  · H/o Proctitis not seen on current CT  · Large Hiatal hernia     PLAN:  = Replace Mg and Phos  = U culture negative so far  = WBC going up but clinically patient is doing well ? Due to steroids.    = All culture negative so far will stop Azactam and diflucan  = No diarrhoea  = DC iv fluids  = BP elevated, home losartan is started and will add prn hydralazine  -- SSI  -- PPI  -- Elevated INR not sure due to poor po intake or going in DIC. Ekevated D Dimer noted INR improved   -- Central line placed due to poor access and low blood pressure get periphral line and remove central line  -- Patient is progressing well, Mild elevation of WBC but not on any drips on RA walking and sitting in bed   -- OT and PT   -- Transfer out of ICU <Get peripheral line and DC central line spoke with nursing>   CVS:Hypotension most likely due to sepsis. Doing well Cardiac enz is ok. Started on losartan due to overnight high blood pressure  RS:No issues with hypoxia. Aspiration precaution. Prn bronchodilators. ID:UTi and ABD wall cellulitis. Vanco since 11/17, Levaquin 11/17. Stop Doxy 11/18. Stop Flagyl 11/18. Diflucan since 11/17- stop on 1/20. Rpt urine culture negative. Azactam DC on 1/20. Elevated WBC can be due to steroids? ?  ENDO:SSI   GI:Hiatal hernia. Continue PPI   RENAL:Monitor CR. K ok, MG and phos replaced  CNS:NO acute issue  HEMATOLOGY:elevated INR check fibrinogen and d dimer. PLT is ok. Overall doing well might be mild dic getting better  MUSCULOSKELETAL:No acute issue. Rash will give benadryl and steroids. PAIN AND SEDATION:no issue  · Skin/Wound: Rash management as above  · Electrolytes: Replace electrolytes per ICU electrolyte replacement protocol. · IVF: DC iv fluids   · Nutrition: cont diet  · Prophylaxis: DVT Prophylaxis with SCDs not on heparin due to high inr if continue to do well can resume heparin,. GI Prophylaxis. · Restraints: none  · PT/OT eval and treat. OOB when appropriate. · Lines/Tubes: none. Right IJ 1/18/18 -- remove order on 1/20/18  ADVANCE DIRECTIVE:Full code  FAMILY DISCUSSION:spoke with yukobund and daughter. Updated patient today  Quality Care: PPI, DVT prophylaxis, HOB elevated, Infection control all reviewed and addressed.   Events and notes from last 24 hours reviewed. Care plan discussed with nursing CC TIME:34 min    · Labs and images personally seen and available reports reviewed. · All current medicines are reviewed and doses and prescription adjusted. Subjective/History:   Ivelisse Andrade is a 81 y.o. female who comes to the ed with complaints of abd pain. Patient states she has been having abd pain for the past week or so but it was very severe today therefore came to the ed. Due to this pain, she has not been eating and drinking much. She states pain is on the outside of the abd on the skin pain, its tender and warm to touch. It sharp and constant in nature with 10/10 when worst. No nausea vomiting and other complaints. Denies any fevers, chills, urinary symptoms at home. Does report of dry mouth. Other medical conditions stable at this time and takes her meds as prescribed. In the ED she was noted to have elevated WBC, HR and Cr. CT A/P done did not show any acute findings. On physical exam she was noted to have erythematous region of her lower abd.     1/18/18  Patient was admitted to [facility]  Over period of time in am started becoming hypotensive  Patient also complained about rash all over roman and itching  Not been started on any new meds  ABD ct was repeated showed cellulitis and possible left pararenal fluid suggestive of pyelonephritis  Lactate 3.5  INR 2 ?  Not on coumadin  PLT is normal  Recent CT showed proctitis on Jan 1st  Patient is Diabetic  Denies any diarrhoes just not feeling well     1/19  Doing well  No new issues  BP remained stable overnight  Lactate improved to 1.9  WBC is going up to 22  HB is stable at 12  Renal function improved to 1.16  Rash is also improved   No fever, Phor 1.3 and mg 1.8     120  Doing well  BP is now elevated  Cr is normal now to 1.07  All culture negative  WBC going up  MG nad K low and replaced  Patient is feeling better denies any new complain       Prior to Admission medications    Medication Sig Start Date End Date Taking? Authorizing Provider   sucralfate (CARAFATE) 1 gram tablet Take 1 Tab by mouth four (4) times daily. 17  Yes Danis Simms MD   nortriptyline (PAMELOR) 25 mg capsule Take 25 mg by mouth nightly. Yes Ghada Solitario MD   lovastatin (MEVACOR) 40 mg tablet Take 40 mg by mouth nightly. Yes Ghada Solitario MD   cyclobenzaprine (FLEXERIL) 10 mg tablet Take 10 mg by mouth three (3) times daily as needed for Muscle Spasm(s). Yes Ghada Solitario MD   aspirin (ASPIRIN) 325 mg tablet Take 325 mg by mouth daily. Yes Ghada Solitario MD   losartan (COZAAR) 100 mg tablet Take 100 mg by mouth daily. Yes Ghada Solitario MD   famotidine (PEPCID) 20 mg tablet Take 20 mg by mouth two (2) times a day. Yes Ghada Solitario MD   ezetimibe (ZETIA) 10 mg tablet Take 10 mg by mouth nightly.    Yes Ghada Solitario MD     Current Facility-Administered Medications   Medication Dose Route Frequency    potassium, sodium phosphates (NEUTRA-PHOS) packet 2 Packet  2 Packet Oral Q2H    losartan (COZAAR) tablet 100 mg  100 mg Oral DAILY    sodium bicarbonate tablet 325 mg  325 mg Oral BID    vancomycin (VANCOCIN) 750 mg in dextrose 5% 250 mL IVPB  750 mg IntraVENous Q24H    famotidine (PEPCID) tablet 20 mg  20 mg Oral DAILY    insulin lispro (HUMALOG) injection   SubCUTAneous AC&HS    levoFLOXacin (LEVAQUIN) 750 mg in D5W IVPB  750 mg IntraVENous Q48H    aspirin (ASPIRIN) tablet 325 mg  325 mg Oral DAILY    nortriptyline (PAMELOR) capsule 25 mg  25 mg Oral QHS    sucralfate (CARAFATE) tablet 1 g  1 g Oral QID    nystatin (MYCOSTATIN) 100,000 unit/gram powder   Topical BID    Vancomycin - Pharmacy to dose  1 Each Other Rx Dosing/Monitoring       Objective:   Vital Signs:    Visit Vitals    /86    Pulse 67    Temp 97.5 °F (36.4 °C)    Resp 9    Ht 4' 11\" (1.499 m)    Wt 82 kg (180 lb 12.4 oz)    SpO2 100%    BMI 36.51 kg/m2       O2 Device: Room air       Temp (24hrs), Av.6 °F (36.4 °C), Min:97 °F (36.1 °C), Max:98.1 °F (36.7 °C)       Intake/Output:   Last shift:      01/20 0701 - 01/20 1900  In: 120 [P.O.:120]  Out: 300 [Urine:300]  Last 3 shifts: 01/18 1901 - 01/20 0700  In: 3532.9 [P.O.:360; I.V.:3172.9]  Out: 3278 [Urine:1750]    Intake/Output Summary (Last 24 hours) at 01/20/18 0949  Last data filed at 01/20/18 8476   Gross per 24 hour   Intake          1203.33 ml   Output             1702 ml   Net          -498.67 ml       Review of Systems:  HEENT: No epistaxis, no nasal drainage, no difficulty in swallowing, no redness in eyes  Respiratory: as above  Cardiovascular: no chest pain, no palpitations, no chronic leg edema, no syncope  Gastrointestinal: no abd pain, no vomiting, no diarrhea, no bleeding symptoms  Genitourinary: No urinary symptoms or hematuria  Integument/breast: No ulcers or rashes  Musculoskeletal:Neg  Neurological: No focal weakness, no seizures, no headaches  Behvioral/Psych: No anxiety, no depression  Constitutional: No fever, no chills, no weight loss, no night sweats     Objective:    General: comfortable, no acute distress  HEENT: pupils reactive, sclera anicteric, EOM intact  Neck: No adenopathy or thyroid swelling, no lymphadenopathy or JVD, supple  CVS: S1S2 no murmurs  RS: Mod AE bilaterally, No rales no wheeze  Abd: soft, tenderness on right lower abd  Neuro: non focal, awake, alert  Extrm: no leg edema, clubbing or cyanosis  Lymph node: No obvious palpable lymph node appreciated.   Skin: Diffuse rash all over roman  CBC w/Diff Recent Labs      01/20/18   0605  01/19/18   0452  01/18/18   0255   WBC  27.2*  22.5*  19.5*   RBC  3.95*  3.98*  4.86   HGB  11.7*  12.0  14.5   HCT  34.6*  35.8  44.5   PLT  195  208  196   GRANS  48  46  48   LYMPH  37  17*  14*   EOS  6*  16*  3        Chemistry Recent Labs      01/20/18   0605  01/19/18   1918  01/19/18   0452   01/18/18   1320  01/18/18   0255   GLU  124*   --   110*   --    --   136*   NA  139   --   140   --    --   139   K  3.9   --   4.5 --    --   4.7   CL  108   --   112*   --    --   110*   CO2  21   --   18*   --    --   18*   BUN  31*   --   30*   --    --   34*   CREA  1.04   --   1.16   --    --   1.37*   CA  7.8*   --   7.6*   --    --   7.5*   MG  1.9  2.0  1.8   --    --   2.0   PHOS  2.1*  1.5*  2.6   < >   --    --    AGAP  10   --   10   --    --   11   BUCR  30*   --   26*   --    --   25*   AP   --    --    --    --   123*   --    TP   --    --    --    --   3.5*   --    ALB   --    --    --    --   1.1*   --    GLOB   --    --    --    --   2.4   --    AGRAT   --    --    --    --   0.5*   --     < > = values in this interval not displayed. Lactic Acid Lactic acid   Date Value Ref Range Status   01/19/2018 1.9 0.4 - 2.0 MMOL/L Final     Recent Labs      01/19/18   0452  01/18/18   2344  01/18/18   1710   LAC  1.9  2.0  2.8*        Micro  Recent Labs      01/18/18   1710  01/18/18   1320  01/18/18   1315   CULT  NO GROWTH 2 DAYS  NO GROWTH 2 DAYS  NO GROWTH 2 DAYS     Recent Labs      01/18/18   1710  01/18/18   1320  01/18/18   1315   CULT  NO GROWTH 2 DAYS  NO GROWTH 2 DAYS  NO GROWTH 2 DAYS        ABG No results for input(s): PHI, PHI, POC2, PCO2I, PO2, PO2I, HCO3, HCO3I, FIO2, FIO2I in the last 72 hours. Liver Enzymes Protein, total   Date Value Ref Range Status   01/18/2018 3.5 (L) 6.4 - 8.2 g/dL Final     Albumin   Date Value Ref Range Status   01/18/2018 1.1 (L) 3.4 - 5.0 g/dL Final     Globulin   Date Value Ref Range Status   01/18/2018 2.4 2.0 - 4.0 g/dL Final     A-G Ratio   Date Value Ref Range Status   01/18/2018 0.5 (L) 0.8 - 1.7   Final     AST (SGOT)   Date Value Ref Range Status   01/18/2018 85 (H) 15 - 37 U/L Final     Alk.  phosphatase   Date Value Ref Range Status   01/18/2018 123 (H) 45 - 117 U/L Final     Recent Labs      01/18/18   1320   TP  3.5*   ALB  1.1*   GLOB  2.4   AGRAT  0.5*   SGOT  85*   AP  123*        Cardiac Enzymes No results found for: CPK, CK, CKMMB, CKMB, RCK3, CKMBT, CKNDX, CKND1, CHRISTINA, TROPT, TROIQ, BRANDON, TROPT, TNIPOC, BNP, BNPP     BNP No results found for: BNP, BNPP, XBNPT     Coagulation Recent Labs      01/19/18   0452  01/18/18   1320   PTP  18.6*  22.0*   INR  1.6*  2.0*   APTT  45.6*  56.9*         Thyroid  No results found for: T4, T3U, TSH, TSHEXT, TSHEXT       Lipid Panel No results found for: CHOL, CHOLPOCT, CHOLX, CHLST, CHOLV, 911579, HDL, LDL, LDLC, DLDLP, 103820, VLDLC, VLDL, TGLX, TRIGL, TRIGP, TGLPOCT, CHHD, CHHDX       Urinalysis Lab Results   Component Value Date/Time    Color DARK YELLOW 01/15/2018 07:50 PM    Appearance CLOUDY 01/15/2018 07:50 PM    Specific gravity 1.026 01/15/2018 07:50 PM    pH (UA) 5.0 01/15/2018 07:50 PM    Protein TRACE 01/15/2018 07:50 PM    Glucose NEGATIVE  01/15/2018 07:50 PM    Ketone TRACE 01/15/2018 07:50 PM    Bilirubin MODERATE 01/15/2018 07:50 PM    Urobilinogen 1.0 01/15/2018 07:50 PM    Nitrites POSITIVE 01/15/2018 07:50 PM    Leukocyte Esterase SMALL 01/15/2018 07:50 PM    Epithelial cells FEW 01/15/2018 07:50 PM    Bacteria 1+ 01/15/2018 07:50 PM    WBC 0 to 3 01/15/2018 07:50 PM    RBC 0 to 1 01/15/2018 07:50 PM        XR (Most Recent). CXR reviewed by me and compared with previous CXR   Results from Hospital Encounter encounter on 01/15/18   XR CHEST PORT   Narrative EXAM: Portable upright chest, one view    INDICATION: Hypoxia    COMPARISON: Chest x-ray 1/18/2018, CT abdomen 1/18/2018    _______________    FINDINGS:    Right jugular central venous catheter tip projects at the distal SVC. Cardiac silhouette and pulmonary vascularity are normal. Slight interval  improvement of right basilar subsegmental atelectasis. Eventration of the right  hemidiaphragm is unchanged. Hazy opacity is present across the left hemithorax  base without change most or all of which is secondary to the patient's known  hiatal hernia.  Adjacent atelectasis not excluded.    _______________         Impression IMPRESSION:    Slight interval improvement of right basilar subsegmental atelectasis. Hiatal  hernia plus or minus adjacent left basilar atelectasis without change. CT (Most Recent)   Results from Hospital Encounter encounter on 01/15/18   CT ABD PELV WO CONT   Narrative EXAM: CT of the Abdomen and Pelvis    INDICATION: Abdominal wall cellulitis, questionable abscess formation, patient  still febrile. COMPARISON: 01/15/2018    TECHNIQUE: Axial CT imaging of the abdomen and pelvis was performed without  intravenous contrast. Multiplanar reformats were generated. Dose reduction  techniques used: Automated exposure control, adjustment of the mAs and/or kVp  according to patient's size, and iterative reconstruction techniques. _______________    FINDINGS:    LOWER CHEST: Right middle lobe and left lower lung persistent atelectasis. No  consolidation or pleural effusion. No convincing pericardial effusion. Large  hiatus hernia, similar to prior exam.. LIVER, BILIARY: Noncontrast evaluation of the liver demonstrates an unchanged  stable hypodensity in the left lobe, too small to characterize, probably benign. No acute or interval change. No biliary dilation. Gallbladder is unremarkable. PANCREAS: No acute abnormality. SPLEEN: Normal.    ADRENALS: Normal.    KIDNEYS/URETERS/BLADDER: No findings of hydronephrosis. Interval developing mild  left pararenal stranding and a tiny amount of fluid. No hydroureter. Decompressed bladder with Gonzalez catheter in place. LYMPH NODES: No enlarged lymph nodes. GASTROINTESTINAL TRACT: No bowel dilation or wall thickening. PELVIC ORGANS: Multiple coarse calcification stations within the uterus, in  keeping with degenerative leiomyoma. VASCULATURE: Atherosclerotic vascular calcification of the abdominal aorta  without aneurysm. BONES: No acute or aggressive osseous abnormalities identified. Stable CT  appearance of multilevel severe degenerative disc disease notably at T12-L2,  L4-5 and L5-S1. Stable endplate degenerative sclerosis and irregularity prior  exams with degenerative partial vertebral body fusion at L1-2. Stable minimal  grade 1 anterolisthesis of L4/5. Severe left hip DJD. OTHER:   -Trace amount of fluid in the upper left paracolic gutter with minimal left  pararenal fascial thickening.  -Interval developing left greater the right soft tissue anasarca with left  abdominal to pelvic dermal thickening with soft tissue stranding.    _______________         Impression IMPRESSION:    1. Nonspecific CT appearance of interval mild developing left pararenal  stranding, tiny amount of perinephric fluid, small amount of fluid in the upper  left paracolic gutter with localized left perirenal fascial thickening. Diagnostic considerations may include infectious etiology such as left-sided  pyelonephritis. Recommend clinical correlation to include urinalysis. 2. Developing soft tissue anasarca, left greater the right with nonspecific left  abdominal to pelvic dermal thickening with soft tissue stranding. Diagnostic  considerations include cellulitis and/or nonspecific edema. Otherwise, no  discrete organized fluid collection on this noncontrast exam.               EKG No results found for this or any previous visit. ECHO Left ventricle: Systolic function was hyperdynamic by EF (biplane method of   disks). Ejection fraction was estimated to  be 72 % in the range of 70 % to 75 %. There were no regional wall motion   abnormalities. Doppler parameters were  consistent with abnormal left ventricular relaxation (grade 1 diastolic   dysfunction). Left atrium: The atrium was mildly dilated. Mitral valve: There was mild annular calcification. Tricuspid valve: There was mild pulmonary hypertension. PFT No flowsheet data found.      Other ASA reactivity:   Pre-albumin:   Ionized Calcium:   NH4:   T3, FT4:  Cortisol:  Urine Osm:  Urine Lytes:   HbA1c:          Imaging:  I have personally reviewed the patients radiographs and have reviewed the reports:     Best practice :  Fluids started at 30 ml/kg with aim to keep CVP 8 or above  Lactic acid ordered- initial and repeat Q6hrs if elevated till normalized. Cultures drawn. Antibiotic administered within 1hr-ICU  And 3hrs ED  Pressors aim MAP >65mmHg  Steriods  Glycemic control  Mech. Ventilated patients- aim to keep peak plateau pressure 73-81MH H2O. Sress ulcer prophylaxis  DVT prophylaxis    Vent bundle, Gonzalez bundle and central line bundle followed. Pastora Vivas M.D.   Pulmonary Critical Care & Sleep Medicine

## 2018-01-20 NOTE — ROUTINE PROCESS
TRANSFER - IN REPORT:    Verbal report received from MAJO Polanco (name) on Huseyin Dsouza  being received from ICU (unit) for routine progression of care      Report consisted of patients Situation, Background, Assessment and   Recommendations(SBAR). Information from the following report(s) SBAR, Kardex, Intake/Output, MAR and Recent Results was reviewed with the receiving nurse. Opportunity for questions and clarification was provided. Assessment completed upon patients arrival to unit and care assumed.

## 2018-01-20 NOTE — PROGRESS NOTES
Initial assessment completed. AAOX4. No c/o of any kind of pain. On RA w/out sob. Monitor shows nsr w/out ectopy. Afebrile. 0900- Dr Alexsandra Montgomery visited w/ new order. 1000- Dr Kevyn Grady visited w/ new order, for transfer to [facility]. Watching TV   Monitor no changed. 1100- TLC d/c as ordered after peripheral iv ins    1130- . ................ TRANSFER - OUT REPORT:    Verbal report given to Mehul RN(name) on [facility]  being transferred to [facility](unit) for routine progression of care       Report consisted of patients Situation, Background, Assessment and   Recommendations(SBAR). Information from the following report(s) SBAR, Kardex, ED Summary, Procedure Summary, Intake/Output, MAR and Recent Results was reviewed with the receiving nurse. Lines:   Triple Lumen 01/18/18 Right Internal jugular (Active)   Central Line Being Utilized Yes 1/20/2018  8:00 AM   Criteria for Appropriate Use Hemodynamically unstable, requiring monitoring lines, vasopressors, or volume resuscitation 1/20/2018  8:00 AM   Site Assessment Intact 1/20/2018  8:00 AM   Infiltration Assessment 0 1/20/2018  8:00 AM   Affected Extremity/Extremities Color distal to insertion site pink (or appropriate for race) 1/20/2018  8:00 AM   Date of Last Dressing Change 01/18/18 1/20/2018  8:00 AM   Dressing Status Intact 1/20/2018  8:00 AM   Dressing Type Disk with Chlorhexadine gluconate (CHG) 1/20/2018  8:00 AM   Action Taken Open ports on tubing capped 1/20/2018  8:00 AM   Proximal Hub Color/Line Status White; Infusing 1/20/2018  8:00 AM   Positive Blood Return (Medial Site) Yes 1/20/2018  8:00 AM   Medial Hub Color/Line Status Capped;Flushed 1/20/2018  8:00 AM   Positive Blood Return (Lateral Site) Yes 1/20/2018  8:00 AM   Distal Hub Color/Line Status Brown 1/20/2018  8:00 AM   Positive Blood Return (Site #3) Yes 1/20/2018  8:00 AM   Alcohol Cap Used Yes 1/19/2018  4:01 PM        Opportunity for questions and clarification was provided.       Patient transported with:   Monitor  Registered Nurse

## 2018-01-20 NOTE — PROGRESS NOTES
Problem: Mobility Impaired (Adult and Pediatric)  Goal: *Acute Goals and Plan of Care (Insert Text)  Physical Therapy Goals  Initiated 1/18/2018 and to be accomplished within 3-7 day(s)  1. Patient will move from supine to sit and sit to supine  in bed with supervision/set-up. 2.  Patient will transfer from bed to chair and chair to bed with supervision/set-up using the least restrictive device. 3.  Patient will perform sit to stand with supervision/set-up. 4.  Patient will ambulate with supervision/set-up for 150 feet with the least restrictive device. 5.  Patient will ascend/descend 1 stairs without handrail(s) with minimal assistance/contact guard assist.   Outcome: Progressing Towards Goal  physical Therapy TREATMENT    Patient: Brandi Garcia (28 y.o. female)  Date: 1/20/2018  Diagnosis: Sepsis ([address]  Cellulitis of abdominal wall  Cellulitis of groin  Renal insufficiency  UTI (urinary tract infection)  SIRS (systemic inflammatory response syndrome) (HCC)  UTI (urinary tract infection)  Abdominal wall cellulitis Cellulitis of abdominal wall       Precautions: Fall   Chart, physical therapy assessment, plan of care and goals were reviewed. ASSESSMENT:  Pt is able to demonstrate increasing tolerance for ambulation. Pt's Hip abductor strength is limited, which presents as trendelenburg pattern. 3 rest standing breaks (1 Min) required to complete ambulation. Pt has LOB with lateral stepping, but fairly solid with forward amb. Progression toward goals:  []      Improving appropriately and progressing toward goals  [x]      Improving slowly and progressing toward goals  []      Not making progress toward goals and plan of care will be adjusted     PLAN:  Patient continues to benefit from skilled intervention to address the above impairments. Continue treatment per established plan of care.   Discharge Recommendations:  Home Health or To Be Determined  Further Equipment Recommendations for Discharge: bedside commode and rolling walker     SUBJECTIVE:   Patient stated I'm gonna fall!     OBJECTIVE DATA SUMMARY:   Critical Behavior:  Neurologic State: Alert  Orientation Level: Oriented X4  Cognition: Follows commands  Functional Mobility Training:  Bed Mobility:  Rolling: Stand-by asssistance  Supine to Sit: Stand-by asssistance  Scooting: Stand-by asssistance  Transfers:  Sit to Stand: Contact guard assistance;Minimum assistance  Stand to Sit: Contact guard assistance;Minimum assistance  Balance:  Sitting: Intact  Standing: Impaired; With support  Standing - Static: Fair  Standing - Dynamic : Poor  Ambulation/Gait Training:  Distance (ft): 120 Feet (ft)  Assistive Device: Gait belt;Walker, rolling  Ambulation - Level of Assistance: Minimal assistance; Moderate assistance  Gait Abnormalities: Decreased step clearance; Step to gait  Base of Support: Widened  Stance: Time  Speed/Xiao: Slow;Shuffled  Step Length: Right shortened;Left shortened  Pain:  Pain Scale 1: Numeric (0 - 10)  Pain Intensity 1: 0   Pain out: 0 (pain notes R hip discomfort)  Activity Tolerance:   Fair-  Please refer to the flowsheet for vital signs taken during this treatment.   After treatment:   [] Patient left in no apparent distress sitting up in chair  [x] Patient left in no apparent distress in bed  [x] Call bell left within reach  [x] Nursing notified  [] Caregiver present  [] Bed alarm activated      Justin Vera PTA   Time Calculation: 31 mins

## 2018-01-21 ENCOUNTER — APPOINTMENT (OUTPATIENT)
Dept: CT IMAGING | Age: 73
DRG: 871 | End: 2018-01-21
Attending: HOSPITALIST
Payer: MEDICARE

## 2018-01-21 PROBLEM — D72.829 LEUKOCYTOSIS: Status: ACTIVE | Noted: 2018-01-21

## 2018-01-21 LAB
ANION GAP SERPL CALC-SCNC: 10 MMOL/L (ref 3–18)
BACTERIA SPEC CULT: NORMAL
BASOPHILS # BLD: 0 K/UL (ref 0–0.1)
BASOPHILS NFR BLD: 0 % (ref 0–3)
BUN SERPL-MCNC: 22 MG/DL (ref 7–18)
BUN/CREAT SERPL: 23 (ref 12–20)
CALCIUM SERPL-MCNC: 8.3 MG/DL (ref 8.5–10.1)
CHLORIDE SERPL-SCNC: 110 MMOL/L (ref 100–108)
CO2 SERPL-SCNC: 23 MMOL/L (ref 21–32)
CREAT SERPL-MCNC: 0.96 MG/DL (ref 0.6–1.3)
DIFFERENTIAL METHOD BLD: ABNORMAL
EOSINOPHIL # BLD: 5.9 K/UL (ref 0–0.4)
EOSINOPHIL NFR BLD: 19 % (ref 0–5)
ERYTHROCYTE [DISTWIDTH] IN BLOOD BY AUTOMATED COUNT: 15.5 % (ref 11.6–14.5)
GLUCOSE BLD STRIP.AUTO-MCNC: 110 MG/DL (ref 70–110)
GLUCOSE BLD STRIP.AUTO-MCNC: 77 MG/DL (ref 70–110)
GLUCOSE BLD STRIP.AUTO-MCNC: 83 MG/DL (ref 70–110)
GLUCOSE BLD STRIP.AUTO-MCNC: 92 MG/DL (ref 70–110)
GLUCOSE SERPL-MCNC: 82 MG/DL (ref 74–99)
HCT VFR BLD AUTO: 33.3 % (ref 35–45)
HGB BLD-MCNC: 11.6 G/DL (ref 12–16)
LYMPHOCYTES # BLD: 8.1 K/UL (ref 0.8–3.5)
LYMPHOCYTES NFR BLD: 26 % (ref 20–51)
MAGNESIUM SERPL-MCNC: 1.6 MG/DL (ref 1.6–2.6)
MCH RBC QN AUTO: 30.3 PG (ref 24–34)
MCHC RBC AUTO-ENTMCNC: 34.8 G/DL (ref 31–37)
MCV RBC AUTO: 86.9 FL (ref 74–97)
MONOCYTES # BLD: 3.7 K/UL (ref 0–1)
MONOCYTES NFR BLD: 12 % (ref 2–9)
NEUTS BAND NFR BLD MANUAL: 1 % (ref 0–5)
NEUTS SEG # BLD: 13.1 K/UL (ref 1.8–8)
NEUTS SEG NFR BLD: 42 % (ref 42–75)
PLATELET # BLD AUTO: 219 K/UL (ref 135–420)
PMV BLD AUTO: 9.8 FL (ref 9.2–11.8)
POTASSIUM SERPL-SCNC: 3.7 MMOL/L (ref 3.5–5.5)
RBC # BLD AUTO: 3.83 M/UL (ref 4.2–5.3)
RBC MORPH BLD: ABNORMAL
SERVICE CMNT-IMP: NORMAL
SODIUM SERPL-SCNC: 143 MMOL/L (ref 136–145)
WBC # BLD AUTO: 31.1 K/UL (ref 4.6–13.2)

## 2018-01-21 PROCEDURE — 74011250637 HC RX REV CODE- 250/637: Performed by: INTERNAL MEDICINE

## 2018-01-21 PROCEDURE — 80048 BASIC METABOLIC PNL TOTAL CA: CPT | Performed by: HOSPITALIST

## 2018-01-21 PROCEDURE — 82962 GLUCOSE BLOOD TEST: CPT

## 2018-01-21 PROCEDURE — 97116 GAIT TRAINING THERAPY: CPT

## 2018-01-21 PROCEDURE — 36415 COLL VENOUS BLD VENIPUNCTURE: CPT | Performed by: HOSPITALIST

## 2018-01-21 PROCEDURE — 83735 ASSAY OF MAGNESIUM: CPT | Performed by: HOSPITALIST

## 2018-01-21 PROCEDURE — 73700 CT LOWER EXTREMITY W/O DYE: CPT

## 2018-01-21 PROCEDURE — 65660000000 HC RM CCU STEPDOWN

## 2018-01-21 PROCEDURE — 74011250636 HC RX REV CODE- 250/636: Performed by: INTERNAL MEDICINE

## 2018-01-21 PROCEDURE — 74011250637 HC RX REV CODE- 250/637: Performed by: HOSPITALIST

## 2018-01-21 PROCEDURE — 85025 COMPLETE CBC W/AUTO DIFF WBC: CPT | Performed by: HOSPITALIST

## 2018-01-21 RX ORDER — LEVOFLOXACIN 750 MG/1
750 TABLET ORAL ONCE
Status: DISCONTINUED | OUTPATIENT
Start: 2018-01-21 | End: 2018-01-21 | Stop reason: SDUPTHER

## 2018-01-21 RX ORDER — METRONIDAZOLE 250 MG/1
500 TABLET ORAL EVERY 8 HOURS
Status: DISPENSED | OUTPATIENT
Start: 2018-01-21 | End: 2018-01-22

## 2018-01-21 RX ORDER — LEVOFLOXACIN 5 MG/ML
750 INJECTION, SOLUTION INTRAVENOUS EVERY 24 HOURS
Status: DISCONTINUED | OUTPATIENT
Start: 2018-01-21 | End: 2018-01-26 | Stop reason: HOSPADM

## 2018-01-21 RX ORDER — FAMOTIDINE 20 MG/1
20 TABLET, FILM COATED ORAL 2 TIMES DAILY
Status: DISCONTINUED | OUTPATIENT
Start: 2018-01-21 | End: 2018-01-26 | Stop reason: HOSPADM

## 2018-01-21 RX ORDER — LEVOFLOXACIN 750 MG/1
750 TABLET ORAL ONCE
Status: COMPLETED | OUTPATIENT
Start: 2018-01-21 | End: 2018-01-21

## 2018-01-21 RX ORDER — METRONIDAZOLE 500 MG/100ML
500 INJECTION, SOLUTION INTRAVENOUS EVERY 8 HOURS
Status: DISCONTINUED | OUTPATIENT
Start: 2018-01-21 | End: 2018-01-26 | Stop reason: HOSPADM

## 2018-01-21 RX ADMIN — METRONIDAZOLE 500 MG: 250 TABLET ORAL at 20:03

## 2018-01-21 RX ADMIN — LEVOFLOXACIN 750 MG: 5 INJECTION, SOLUTION INTRAVENOUS at 13:36

## 2018-01-21 RX ADMIN — LEVOFLOXACIN 750 MG: 750 TABLET, FILM COATED ORAL at 20:03

## 2018-01-21 RX ADMIN — SODIUM BICARBONATE 650 MG TABLET 325 MG: at 22:20

## 2018-01-21 RX ADMIN — FAMOTIDINE 20 MG: 20 TABLET ORAL at 22:28

## 2018-01-21 RX ADMIN — NYSTATIN: 100000 POWDER TOPICAL at 11:22

## 2018-01-21 RX ADMIN — ASPIRIN 325 MG ORAL TABLET 325 MG: 325 PILL ORAL at 08:20

## 2018-01-21 RX ADMIN — NYSTATIN: 100000 POWDER TOPICAL at 22:21

## 2018-01-21 RX ADMIN — SODIUM BICARBONATE 650 MG TABLET 325 MG: at 08:21

## 2018-01-21 RX ADMIN — SUCRALFATE 1 G: 1 TABLET ORAL at 13:36

## 2018-01-21 RX ADMIN — SUCRALFATE 1 G: 1 TABLET ORAL at 20:03

## 2018-01-21 RX ADMIN — LOSARTAN POTASSIUM 100 MG: 50 TABLET ORAL at 08:20

## 2018-01-21 RX ADMIN — NORTRIPTYLINE HYDROCHLORIDE 25 MG: 25 CAPSULE ORAL at 22:27

## 2018-01-21 RX ADMIN — SUCRALFATE 1 G: 1 TABLET ORAL at 08:21

## 2018-01-21 RX ADMIN — FAMOTIDINE 20 MG: 20 TABLET ORAL at 08:21

## 2018-01-21 NOTE — PROGRESS NOTES
Reload with 1750 mg vancomycin X1 today to facilitate rapid attainment of targeted vancomycin trough level

## 2018-01-21 NOTE — PROGRESS NOTES
Problem: Falls - Risk of  Goal: *Absence of Falls  Document Santiago Fall Risk and appropriate interventions in the flowsheet.    Outcome: Progressing Towards Goal  Fall Risk Interventions:  Mobility Interventions: Patient to call before getting OOB    Mentation Interventions: Adequate sleep, hydration, pain control    Medication Interventions: Patient to call before getting OOB    Elimination Interventions: Call light in reach, Patient to call for help with toileting needs

## 2018-01-21 NOTE — PROGRESS NOTES
0730 Assumed responsibility for patient from Alena Zambrano, RN    6937 Spoke with patient's Chloé Henriquez, about condition with patient's consent. 1441 Patient IV infiltrated. Removed IV, applied warm compress, and elevation. Patient previously had central line prior to arriving from ICU. No other viable options for iv site. 1520 Paged Dr. Carli Burleson to see if iv medications can be converted to oral. Dr. Carli Burleson requested attempts be made to get new iv line    [phone] ED tech and nursing supervisor up to try to get iv line on patient. Two attempts without success. 80 Patient's daughter at nursing desk requesting to speak with nurse. Daughter upset with mother being stuck again today while she was not at bedside. Patient needed to get iv 1/20/18. A 24 g was placed in patient's right hand. Daughter stated she was told that patient would not be stuck again for an iv after this one was placed. Unfortunately the patient received her levoquin on 1/21/18 and her iv infiltrated. Daughter then stated , \" if I have to I will camp out here to make sure she does not get stuck again. \"  This nurse reassured daughter we would not be sticking her again. I would call Dr. Carli Burleson and ask how she wanted to proceed. Dr. Carli Burleson paged. Levoquin and flagyl changed to oral.  Pharmacy also made aware that there was no iv access currently. Nursing supervisors Clover Wood and Dewayne Vásquez came to bedside and spoke with patient and daughter. Daughter requested PICC line. 2015 Spoke with oncall hospitalist and received approval to place order for PICC. 2025 Told daughter that order for PICC line was placed. Could not say what time it would happen tomorrow as schedule would be made tomorrow    Bedside shift change report given to Lucia Shankar (oncoming nurse) by Miriam Plummer (offgoing nurse). Report included the following information SBAR, Kardex and MAR.

## 2018-01-21 NOTE — PROGRESS NOTES
Tasha Shirley M.D  [address] [facility] [address]  Phone [phone] Fax [phone]  Pulmonary Critical Care & Sleep Medicine       Name: Surinder Green MRN: [id number]   : 1945 Hospital: [facility] [place]   Date: 2018        PCCM Follow Up    Requesting MD:   Dr. Rafi Duarte                                               Reason for CC Consult:Severe sepsis    IMPRESSION:   Patient Active Problem List   Diagnosis Code    Other and unspecified noninfectious gastroenteritis and colitis(558.9) K52.9    Rectal bleeding K62.5    HTN (hypertension) I10    Unspecified constipation K59.00    Constipation K59.00    Ischemic colitis ([address] K55.9    Diverticulosis of colon K57.30    Internal hemorrhoids K64.8    Cellulitis of abdominal wall L03.311    Cellulitis of groin L03.314    UTI (urinary tract infection) N39.0    Renal insufficiency N28.9    Sepsis ([address] A41.9    GERD (gastroesophageal reflux disease) K21.9    HLD (hyperlipidemia) E78.5    Moderate dehydration Q48.4    Diastolic CHF, chronic (HCC) I50.32    SIRS (systemic inflammatory response syndrome) (HCC) R65.10    Abdominal wall cellulitis L03.311    Proctitis K62.89    Acute encephalopathy G93.40    Rash R21    Hypotension I95.9    Severe sepsis (HCC) A41.9, R65.20    Leukocytosis D72.829 ·   Possible pyelonephritis Abd wall cellulitis resulting in sepsis  · ? Rash and itching ? Allergy to medication . Fatmata Byrne Did not receive any new medication. · Mild renal insufficiency due to ATN and hypotension improved  · Lactic acidosis due to sepsis improved  · Elevated INR ? Etiology ?  Early DIC vs poor po intake improved  · H/o Proctitis not seen on current CT  · Large Hiatal hernia     PLAN:  = Doing well post ICU transfer  = Hemodynamically stable oxygenating well  = WBC going up Hospitalist plan for thigh CT  = central line removed  = Overall doing well WBC was elevated and hospitalist started flagyl      = U culture negative so far  = WBC going up but clinically patient is doing well ? Due to steroids. = All culture negative so far will stop Azactam and diflucan  = No diarrhoea  = BP elevated, home losartan is started and will on prn hydralazine  -- SSI  -- PPI  -- Elevated INR not sure due to poor po intake or going in DIC. Ekevated D Dimer noted INR improved   -- Central line placed due to poor access and low blood pressure get periphral line and remove central line  -- Patient is progressing well, Mild elevation of WBC but not on any drips on RA walking and sitting in bed   -- OT and PT    CVS:Hypotension most likely due to sepsis. Doing well Cardiac enz is ok. Started on losartan due to overnight high blood pressure  RS:No issues with hypoxia. Aspiration precaution. Prn bronchodilators. ID:UTi and ABD wall cellulitis. Vanco since 11/17, Levaquin 11/17. Stop Doxy 11/18. Stop Flagyl 11/18. Diflucan since 11/17- stop on 1/20. Rpt urine culture negative. Azactam DC on 1/20. Elevated WBC can be due to steroids? ?  ENDO:SSI   GI:Hiatal hernia. Continue PPI   RENAL:Monitor CR. K ok, MG and phos replaced  HEMATOLOGY:elevated INR check fibrinogen and d dimer. PLT is ok. Overall doing well might be mild dic getting better  MUSCULOSKELETAL:No acute issue. Rash will give benadryl and steroids. · Lines/Tubes: none. Right IJ 1/18/18 -- remove order on 1/20/18  ADVANCE DIRECTIVE:Full code  FAMILY DISCUSSION:spoke with yukobund and daughter. Updated patient today  Overall doing well post ICU transfer will see once and if continue to do well can be signed off  I spent 35  min of time excluding procedure, with face to face evaluation  >50% on complex decision making, coordination of care and counseling patient. · Labs and images personally seen and available reports reviewed. · All current medicines are reviewed and doses and prescription adjusted.   Subjective/History: Nayeli Gonzales is a 81 y.o. female who comes to the ed with complaints of abd pain. Patient states she has been having abd pain for the past week or so but it was very severe today therefore came to the ed. Due to this pain, she has not been eating and drinking much. She states pain is on the outside of the abd on the skin pain, its tender and warm to touch. It sharp and constant in nature with 10/10 when worst. No nausea vomiting and other complaints. Denies any fevers, chills, urinary symptoms at home. Does report of dry mouth. Other medical conditions stable at this time and takes her meds as prescribed. In the ED she was noted to have elevated WBC, HR and Cr. CT A/P done did not show any acute findings. On physical exam she was noted to have erythematous region of her lower abd.     1/18/18  Patient was admitted to [facility]  Over period of time in am started becoming hypotensive  Patient also complained about rash all over roman and itching  Not been started on any new meds  ABD ct was repeated showed cellulitis and possible left pararenal fluid suggestive of pyelonephritis  Lactate 3.5  INR 2 ? Not on coumadin  PLT is normal  Recent CT showed proctitis on Jan 1st  Patient is Diabetic  Denies any diarrhoes just not feeling well     1/19  Doing well  No new issues  BP remained stable overnight  Lactate improved to 1.9  WBC is going up to 22  HB is stable at 12  Renal function improved to 1.16  Rash is also improved   No fever, Phor 1.3 and mg 1.8     120  Doing well  BP is now elevated  Cr is normal now to 1.07  All culture negative  WBC going up  MG nad K low and replaced  Patient is feeling better denies any new complain     1/21  Followed post ICU transfer   Doing well  BP is stable on RA and ambulating  WBC is going up now 31k  Cr is normal now      Prior to Admission medications    Medication Sig Start Date End Date Taking?  Authorizing Provider   sucralfate (CARAFATE) 1 gram tablet Take 1 Tab by mouth four (4) times daily. 17  Yes Faye Rice MD   nortriptyline (PAMELOR) 25 mg capsule Take 25 mg by mouth nightly. Yes Ghada Solitario MD   lovastatin (MEVACOR) 40 mg tablet Take 40 mg by mouth nightly. Yes Ghada Solitario MD   cyclobenzaprine (FLEXERIL) 10 mg tablet Take 10 mg by mouth three (3) times daily as needed for Muscle Spasm(s). Yes Ghada Solitario MD   aspirin (ASPIRIN) 325 mg tablet Take 325 mg by mouth daily. Yes Ghada Solitario MD   losartan (COZAAR) 100 mg tablet Take 100 mg by mouth daily. Yes Ghada Solitario MD   famotidine (PEPCID) 20 mg tablet Take 20 mg by mouth two (2) times a day. Yes Ghada Solitario MD   ezetimibe (ZETIA) 10 mg tablet Take 10 mg by mouth nightly.    Yes Ghada Solitario MD     Current Facility-Administered Medications   Medication Dose Route Frequency    levoFLOXacin (LEVAQUIN) 750 mg in D5W IVPB  750 mg IntraVENous Q24H    metroNIDAZOLE (FLAGYL) IVPB premix 500 mg  500 mg IntraVENous Q8H    losartan (COZAAR) tablet 100 mg  100 mg Oral DAILY    vancomycin (VANCOCIN) 1250 mg in D5W 250 mL infusion  1,250 mg IntraVENous Q24H    sodium bicarbonate tablet 325 mg  325 mg Oral BID    famotidine (PEPCID) tablet 20 mg  20 mg Oral DAILY    insulin lispro (HUMALOG) injection   SubCUTAneous AC&HS    aspirin (ASPIRIN) tablet 325 mg  325 mg Oral DAILY    nortriptyline (PAMELOR) capsule 25 mg  25 mg Oral QHS    sucralfate (CARAFATE) tablet 1 g  1 g Oral QID    nystatin (MYCOSTATIN) 100,000 unit/gram powder   Topical BID    Vancomycin - Pharmacy to dose  1 Each Other Rx Dosing/Monitoring       Objective:   Vital Signs:    Visit Vitals    /86 (BP 1 Location: Left arm, BP Patient Position: At rest)    Pulse 89    Temp 98 °F (36.7 °C)    Resp 18    Ht 4' 11\" (1.499 m)    Wt 82 kg (180 lb 12.4 oz)    SpO2 100%    BMI 36.51 kg/m2       O2 Device: Room air       Temp (24hrs), Av.8 °F (36.6 °C), Min:97.5 °F (36.4 °C), Max:98 °F (36.7 °C)       Intake/Output:   Last shift:      01/21 0701 - 01/21 1900  In: 120 [P.O.:120]  Out: 300 [Urine:300]  Last 3 shifts: 01/19 1901 - 01/21 0700  In: 1928.3 [P.O.:1080; I.V.:848.3]  Out: 2500 [Urine:2500]    Intake/Output Summary (Last 24 hours) at 01/21/18 1223  Last data filed at 01/21/18 0955   Gross per 24 hour   Intake             1330 ml   Output             1400 ml   Net              -70 ml       Review of Systems:  HEENT: No epistaxis, no nasal drainage, no difficulty in swallowing, no redness in eyes  Respiratory: as above  Cardiovascular: no chest pain, no palpitations, no chronic leg edema, no syncope  Gastrointestinal: no abd pain, no vomiting, no diarrhea, no bleeding symptoms  Genitourinary: No urinary symptoms or hematuria  Integument/breast: No ulcers or rashes      Objective:    General: comfortable, no acute distress  HEENT: pupils reactive, sclera anicteric, EOM intact  Neck: No adenopathy or thyroid swelling, no lymphadenopathy or JVD, supple  CVS: S1S2 no murmurs  RS: Mod AE bilaterally, No rales no wheeze  Abd: soft, tenderness on right lower abd  Neuro: non focal, awake, alert  Extrm: no leg edema, clubbing or cyanosis  Lymph node: No obvious palpable lymph node appreciated.   Skin: Diffuse rash all over roman  CBC w/Diff Recent Labs      01/21/18   0832  01/20/18   0605  01/19/18   0452   WBC  31.1*  27.2*  22.5*   RBC  3.83*  3.95*  3.98*   HGB  11.6*  11.7*  12.0   HCT  33.3*  34.6*  35.8   PLT  219  195  208   GRANS  42  48  46   LYMPH  26  37  17*   EOS  19*  6*  16*        Chemistry Recent Labs      01/21/18   0832  01/20/18   0605  01/19/18   1918  01/19/18   0452   01/18/18   1320   GLU  82  124*   --   110*   --    --    NA  143  139   --   140   --    --    K  3.7  3.9   --   4.5   --    --    CL  110*  108   --   112*   --    --    CO2  23  21   --   18*   --    --    BUN  22*  31*   --   30*   --    --    CREA  0.96  1.04   --   1.16   --    --    CA  8.3*  7.8*   --   7.6*   --    --    MG  1.6  1.9  2.0  1.8 < >   --    PHOS   --   2.1*  1.5*  2.6   < >   --    AGAP  10  10   --   10   --    --    BUCR  23*  30*   --   26*   --    --    AP   --    --    --    --    --   123*   TP   --    --    --    --    --   3.5*   ALB   --    --    --    --    --   1.1*   GLOB   --    --    --    --    --   2.4   AGRAT   --    --    --    --    --   0.5*    < > = values in this interval not displayed. Lactic Acid Lactic acid   Date Value Ref Range Status   01/19/2018 1.9 0.4 - 2.0 MMOL/L Final     Recent Labs      01/19/18   0452  01/18/18   2344  01/18/18   1710   LAC  1.9  2.0  2.8*        Micro  Recent Labs      01/18/18   1710  01/18/18   1320  01/18/18   1315   CULT  NO GROWTH 2 DAYS  NO GROWTH 3 DAYS  NO GROWTH 3 DAYS     Recent Labs      01/18/18   1710  01/18/18   1320  01/18/18   1315   CULT  NO GROWTH 2 DAYS  NO GROWTH 3 DAYS  NO GROWTH 3 DAYS        ABG No results for input(s): PHI, PHI, POC2, PCO2I, PO2, PO2I, HCO3, HCO3I, FIO2, FIO2I in the last 72 hours. Liver Enzymes Protein, total   Date Value Ref Range Status   01/18/2018 3.5 (L) 6.4 - 8.2 g/dL Final     Albumin   Date Value Ref Range Status   01/18/2018 1.1 (L) 3.4 - 5.0 g/dL Final     Globulin   Date Value Ref Range Status   01/18/2018 2.4 2.0 - 4.0 g/dL Final     A-G Ratio   Date Value Ref Range Status   01/18/2018 0.5 (L) 0.8 - 1.7   Final     AST (SGOT)   Date Value Ref Range Status   01/18/2018 85 (H) 15 - 37 U/L Final     Alk.  phosphatase   Date Value Ref Range Status   01/18/2018 123 (H) 45 - 117 U/L Final     Recent Labs      01/18/18   1320   TP  3.5*   ALB  1.1*   GLOB  2.4   AGRAT  0.5*   SGOT  85*   AP  123*        Cardiac Enzymes No results found for: CPK, CK, CKMMB, CKMB, RCK3, CKMBT, CKNDX, CKND1, CHRISTINA, TROPT, TROIQ, BRANDON, TROPT, TNIPOC, BNP, BNPP     BNP No results found for: BNP, BNPP, XBNPT     Coagulation Recent Labs      01/19/18   0452  01/18/18   1320   PTP  18.6*  22.0*   INR  1.6*  2.0*   APTT  45.6*  56.9*         Thyroid  No results found for: T4, T3U, TSH, TSHEXT, TSHEXT       Lipid Panel No results found for: CHOL, CHOLPOCT, CHOLX, CHLST, CHOLV, 555721, HDL, LDL, LDLC, DLDLP, 644666, VLDLC, VLDL, TGLX, TRIGL, TRIGP, TGLPOCT, CHHD, CHHDX       Urinalysis Lab Results   Component Value Date/Time    Color DARK YELLOW 01/15/2018 07:50 PM    Appearance CLOUDY 01/15/2018 07:50 PM    Specific gravity 1.026 01/15/2018 07:50 PM    pH (UA) 5.0 01/15/2018 07:50 PM    Protein TRACE 01/15/2018 07:50 PM    Glucose NEGATIVE  01/15/2018 07:50 PM    Ketone TRACE 01/15/2018 07:50 PM    Bilirubin MODERATE 01/15/2018 07:50 PM    Urobilinogen 1.0 01/15/2018 07:50 PM    Nitrites POSITIVE 01/15/2018 07:50 PM    Leukocyte Esterase SMALL 01/15/2018 07:50 PM    Epithelial cells FEW 01/15/2018 07:50 PM    Bacteria 1+ 01/15/2018 07:50 PM    WBC 0 to 3 01/15/2018 07:50 PM    RBC 0 to 1 01/15/2018 07:50 PM        XR (Most Recent). CXR reviewed by me and compared with previous CXR   Results from Hospital Encounter encounter on 01/15/18   XR CHEST PORT   Narrative EXAM: Portable upright chest, one view    INDICATION: Hypoxia    COMPARISON: Chest x-ray 1/18/2018, CT abdomen 1/18/2018    _______________    FINDINGS:    Right jugular central venous catheter tip projects at the distal SVC. Cardiac silhouette and pulmonary vascularity are normal. Slight interval  improvement of right basilar subsegmental atelectasis. Eventration of the right  hemidiaphragm is unchanged. Hazy opacity is present across the left hemithorax  base without change most or all of which is secondary to the patient's known  hiatal hernia. Adjacent atelectasis not excluded.    _______________         Impression IMPRESSION:    Slight interval improvement of right basilar subsegmental atelectasis. Hiatal  hernia plus or minus adjacent left basilar atelectasis without change.            CT (Most Recent)   Results from Hospital Encounter encounter on 01/15/18   CT ABD PELV WO CONT   Narrative EXAM: CT of the Abdomen and Pelvis    INDICATION: Abdominal wall cellulitis, questionable abscess formation, patient  still febrile. COMPARISON: 01/15/2018    TECHNIQUE: Axial CT imaging of the abdomen and pelvis was performed without  intravenous contrast. Multiplanar reformats were generated. Dose reduction  techniques used: Automated exposure control, adjustment of the mAs and/or kVp  according to patient's size, and iterative reconstruction techniques. _______________    FINDINGS:    LOWER CHEST: Right middle lobe and left lower lung persistent atelectasis. No  consolidation or pleural effusion. No convincing pericardial effusion. Large  hiatus hernia, similar to prior exam.. LIVER, BILIARY: Noncontrast evaluation of the liver demonstrates an unchanged  stable hypodensity in the left lobe, too small to characterize, probably benign. No acute or interval change. No biliary dilation. Gallbladder is unremarkable. PANCREAS: No acute abnormality. SPLEEN: Normal.    ADRENALS: Normal.    KIDNEYS/URETERS/BLADDER: No findings of hydronephrosis. Interval developing mild  left pararenal stranding and a tiny amount of fluid. No hydroureter. Decompressed bladder with Gonzalez catheter in place. LYMPH NODES: No enlarged lymph nodes. GASTROINTESTINAL TRACT: No bowel dilation or wall thickening. PELVIC ORGANS: Multiple coarse calcification stations within the uterus, in  keeping with degenerative leiomyoma. VASCULATURE: Atherosclerotic vascular calcification of the abdominal aorta  without aneurysm. BONES: No acute or aggressive osseous abnormalities identified. Stable CT  appearance of multilevel severe degenerative disc disease notably at T12-L2,  L4-5 and L5-S1. Stable endplate degenerative sclerosis and irregularity prior  exams with degenerative partial vertebral body fusion at L1-2. Stable minimal  grade 1 anterolisthesis of L4/5. Severe left hip DJD.     OTHER:   -Trace amount of fluid in the upper left paracolic gutter with minimal left  pararenal fascial thickening.  -Interval developing left greater the right soft tissue anasarca with left  abdominal to pelvic dermal thickening with soft tissue stranding.    _______________         Impression IMPRESSION:    1. Nonspecific CT appearance of interval mild developing left pararenal  stranding, tiny amount of perinephric fluid, small amount of fluid in the upper  left paracolic gutter with localized left perirenal fascial thickening. Diagnostic considerations may include infectious etiology such as left-sided  pyelonephritis. Recommend clinical correlation to include urinalysis. 2. Developing soft tissue anasarca, left greater the right with nonspecific left  abdominal to pelvic dermal thickening with soft tissue stranding. Diagnostic  considerations include cellulitis and/or nonspecific edema. Otherwise, no  discrete organized fluid collection on this noncontrast exam.               EKG No results found for this or any previous visit. ECHO Left ventricle: Systolic function was hyperdynamic by EF (biplane method of   disks). Ejection fraction was estimated to  be 72 % in the range of 70 % to 75 %. There were no regional wall motion   abnormalities. Doppler parameters were  consistent with abnormal left ventricular relaxation (grade 1 diastolic   dysfunction). Left atrium: The atrium was mildly dilated. Mitral valve: There was mild annular calcification. Tricuspid valve: There was mild pulmonary hypertension. PFT No flowsheet data found. Other ASA reactivity:   Pre-albumin:   Ionized Calcium:   NH4:   T3, FT4:  Cortisol:  Urine Osm:  Urine Lytes:   HbA1c:          Imaging:  I have personally reviewed the patients radiographs and have reviewed the reports:     Best practice :  Fluids started at 30 ml/kg with aim to keep CVP 8 or above  Lactic acid ordered- initial and repeat Q6hrs if elevated till normalized.   Cultures drawn.  Antibiotic administered within 1hr-ICU  And 3hrs ED  Pressors aim MAP >65mmHg  Steriods  Glycemic control  Mech. Ventilated patients- aim to keep peak plateau pressure 44-06MS H2O. Sress ulcer prophylaxis  DVT prophylaxis    Vent bundle, Gonzalez bundle and central line bundle followed. Javier Baez M.D.   Pulmonary Critical Care & Sleep Medicine

## 2018-01-21 NOTE — PROGRESS NOTES
Pharmacy Dosing Services:   Levofloxacin    Indication:  Sepsis of Unknown Etiology  Previous Regimen  Levofloxacin 750 mg IV q48hrs   Serum Creatinine Lab Results   Component Value Date/Time    Creatinine 0.96 01/21/2018 08:32 AM    Creatinine, POC 2.6 01/15/2018 08:44 PM      Creatinine Clearance Estimated Creatinine Clearance: 53.5 mL/min (based on Cr of 0.96). BUN Lab Results   Component Value Date/Time    BUN 22 01/21/2018 08:32 AM    BUN, POC 42 01/15/2018 08:44 PM         Dose administration notes:   Changed to Levofloxacin 750 mg IV q24hrs per Renal Dosing Protocol    Plan:  Continue to monitor     Dino Hassan Ozone   [phone]

## 2018-01-21 NOTE — PROGRESS NOTES
1930- Report and care received, assessment completed per flow sheet. Alert, oriented, NAD. Currently does not have IV access despite multiple attempts on day shift, due for vancomycin, ER to send someone up to attempt IV.    2300- Reassessment completed and without change. 5141- Reassessment completed and without change.

## 2018-01-21 NOTE — PROGRESS NOTES
Pharmacy Dosing Services:  Famotidine    Previous Regimen Pepcid 20 mg po Daily   Serum Creatinine Lab Results   Component Value Date/Time    Creatinine 0.96 01/21/2018 08:32 AM    Creatinine, POC 2.6 01/15/2018 08:44 PM      Creatinine Clearance Estimated Creatinine Clearance: 53.5 mL/min (based on Cr of 0.96). BUN Lab Results   Component Value Date/Time    BUN 22 01/21/2018 08:32 AM    BUN, POC 42 01/15/2018 08:44 PM         Dose administration notes:   Changed Pepcid back to 20 mg po Bid due to improved renal function  per Renal Dosing Protocol    Plan:  Continue to monitor     Dino Perez  [phone]

## 2018-01-21 NOTE — PROGRESS NOTES
Hospitalist Progress Note-critical care note     Patient: Will Paniagua MRN: [id number]  CSN: [id number]    YOB: 1945  Age: 81 y.o. Sex: female    DOA: 1/15/2018 LOS:  LOS: 6 days            Chief complaint:sepsis , sandra on ckds , cellulitis , proctitis , hypotension, severe sepsis , acute encephalopathy     Assessment/Plan         Hospital Problems  Date Reviewed: 1/15/2018          Codes Class Noted POA    Acute encephalopathy ICD-10-CM: G93.40  ICD-9-CM: 348.30  1/18/2018 Unknown        Rash ICD-10-CM: R21  ICD-9-CM: 782.1  1/18/2018 Unknown        Hypotension ICD-10-CM: I95.9  ICD-9-CM: 458.9  1/18/2018 Unknown        Severe sepsis ([facility] ICD-10-CM: A41.9, R65.20  ICD-9-CM: 038.9, 995.92  1/18/2018 Unknown        Proctitis ICD-10-CM: K62.89  ICD-9-CM: 569.49  1/17/2018 Unknown        * (Principal)Cellulitis of abdominal wall ICD-10-CM: L03.311  ICD-9-CM: 682.2  1/15/2018 Unknown        Cellulitis of groin ICD-10-CM: L03.314  ICD-9-CM: 682.2  1/15/2018 Unknown        UTI (urinary tract infection) ICD-10-CM: N39.0  ICD-9-CM: 599.0  1/15/2018 Unknown        Renal insufficiency ICD-10-CM: N28.9  ICD-9-CM: 593.9  1/15/2018 Unknown        Sepsis ([facility] ICD-10-CM: A41.9  ICD-9-CM: 038.9, 995.91  1/15/2018 Unknown        GERD (gastroesophageal reflux disease) ICD-10-CM: K21.9  ICD-9-CM: 530.81  1/15/2018 Unknown        HLD (hyperlipidemia) ICD-10-CM: E78.5  ICD-9-CM: 272.4  1/15/2018 Unknown        Moderate dehydration ICD-10-CM: E86.0  ICD-9-CM: 276.51  1/15/2018 Unknown        Diastolic CHF, chronic (HCC) ICD-10-CM: I50.32  ICD-9-CM: 428.32, 428.0  1/15/2018 Unknown        SIRS (systemic inflammatory response syndrome) (HCC) ICD-10-CM: R65.10  ICD-9-CM: 995.90  1/15/2018 Unknown        Abdominal wall cellulitis ICD-10-CM: E32.799  ICD-9-CM: 682.2  1/15/2018 Unknown        HTN (hypertension) ICD-10-CM: I10  ICD-9-CM: 401.9  11/4/2013 Yes            1.  Severe Sepsis with associated Lactic Acidosis in the setting of fever, UTI, abdominal wall cellulitis and cellulitis from groining area   Wbc continue trending up  Echo : ef wnl no vegetation noted   Repeated ct no fluid collection   Repeated bcx so far negative       2. Leukocytosis   Reported some loose stool and add flagyl  Steroid was hold   Cellulitis improving clinically   Will have ct thigh to see any fluid collection         2. Abdominal Wall cellulitis- bacterial and/or fungal and cellulitis of groin   Improving of abdomen wall site  Groin site mild improving also   Continue abx   Wound care   Ct abdomen no abscess. Will have ct thigh due to climbing up leukocytosis     3. UTI  Continue abx   cx so far negative     4. Acute Kidney Injury on ckd3  Cr back to base line   5. Moderate Dehydration   6. Diastolic CHF, chronic, compensated   7. HTN  Restart home medication   8. GERD  ppi   9. HLD  Statin     11. ?Proctitis, not presented per repeated ct   12 acute encephalopathy   Back to her baseline     13 rash (poa)  resolving,        Subjective: feel better   Nurse: no acute issue     Pt clinically feel better, but still leukocytosis with no band today. Will continue monitor, if continue up -hemo need on board vs transfer to facility with id on board     Review of systems:    General: No fevers or chills. Cardiovascular: No chest pain or pressure. No palpitations. Pulmonary: No shortness of breath. Gastrointestinal: No nausea, vomiting. Vital signs/Intake and Output:  Visit Vitals    /86 (BP 1 Location: Left arm, BP Patient Position: At rest)    Pulse 89    Temp 98 °F (36.7 °C)    Resp 18    Ht 4' 11\" (1.499 m)    Wt 82 kg (180 lb 12.4 oz)    SpO2 100%    BMI 36.51 kg/m2     Current Shift:  01/21 0701 - 01/21 1900  In: 120 [P.O.:120]  Out: 300 [Urine:300]  Last three shifts:  01/19 1901 - 01/21 0700  In: 1928.3 [P.O.:1080; I.V.:848.3]  Out: 2500 [Urine:2500]    Physical Exam:  General: WD, WN.   Alert, cooperative, no acute distress    HEENT: NC, Atraumatic. PERRLA, anicteric sclerae. Lungs: CTA Bilaterally. No Wheezing/Rhonchi/Rales. Heart:  rrr,  No murmur, No Rubs, No Gallops  Abdomen: Soft, Non distended, Non tender.  +Bowel sounds, erythema /swelling-lower of abdomen improving, no tenderness  , blisters noted on rt thigh and tenderness   Extremities: No c/c/e  Psych:   Not anxious or agitated. Neurologic:  No acute neurological deficit. Derm: peeling skin noted on face         Labs: Results:       Chemistry Recent Labs      01/21/18   0832  01/20/18   0605  01/19/18   0452  01/18/18   1320   GLU  82  124*  110*   --    NA  143  139  140   --    K  3.7  3.9  4.5   --    CL  110*  108  112*   --    CO2  23  21  18*   --    BUN  22*  31*  30*   --    CREA  0.96  1.04  1.16   --    CA  8.3*  7.8*  7.6*   --    AGAP  10  10  10   --    BUCR  23*  30*  26*   --    AP   --    --    --   123*   TP   --    --    --   3.5*   ALB   --    --    --   1.1*   GLOB   --    --    --   2.4   AGRAT   --    --    --   0.5*      CBC w/Diff Recent Labs      01/21/18   0832  01/20/18   0605  01/19/18   0452   WBC  31.1*  27.2*  22.5*   RBC  3.83*  3.95*  3.98*   HGB  11.6*  11.7*  12.0   HCT  33.3*  34.6*  35.8   PLT  219  195  208   GRANS  42  48  46   LYMPH  26  37  17*   EOS  19*  6*  16*      Cardiac Enzymes No results for input(s): CPK, CKND1, CHRISTINA in the last 72 hours. No lab exists for component: CKRMB, TROIP   Coagulation Recent Labs      01/19/18   0452  01/18/18   1320   PTP  18.6*  22.0*   INR  1.6*  2.0*   APTT  45.6*  56.9*       Lipid Panel No results found for: CHOL, CHOLPOCT, CHOLX, CHLST, CHOLV, 288856, HDL, LDL, LDLC, DLDLP, 112161, VLDLC, VLDL, TGLX, TRIGL, TRIGP, TGLPOCT, CHHD, CHHDX   BNP No results for input(s): BNPP in the last 72 hours.    Liver Enzymes Recent Labs      01/18/18   1320   TP  3.5*   ALB  1.1*   AP  123*   SGOT  85*      Thyroid Studies No results found for: T4, T3U, TSH, TSHEXT, TSHEXT Procedures/imaging: see electronic medical records for all procedures/Xrays and details which were not copied into this note but were reviewed prior to creation of Suzie Rosario MD

## 2018-01-22 ENCOUNTER — APPOINTMENT (OUTPATIENT)
Dept: INTERVENTIONAL RADIOLOGY/VASCULAR | Age: 73
DRG: 871 | End: 2018-01-22
Attending: INTERNAL MEDICINE
Payer: MEDICARE

## 2018-01-22 PROBLEM — E83.42 HYPOMAGNESEMIA: Status: ACTIVE | Noted: 2018-01-22

## 2018-01-22 PROBLEM — E87.6 HYPOKALEMIA: Status: ACTIVE | Noted: 2018-01-22

## 2018-01-22 LAB
ANION GAP SERPL CALC-SCNC: 10 MMOL/L (ref 3–18)
BASOPHILS # BLD: 0 K/UL (ref 0–0.1)
BASOPHILS NFR BLD: 0 % (ref 0–3)
BUN SERPL-MCNC: 14 MG/DL (ref 7–18)
BUN/CREAT SERPL: 15 (ref 12–20)
CALCIUM SERPL-MCNC: 8.5 MG/DL (ref 8.5–10.1)
CHLORIDE SERPL-SCNC: 109 MMOL/L (ref 100–108)
CO2 SERPL-SCNC: 25 MMOL/L (ref 21–32)
CREAT SERPL-MCNC: 0.95 MG/DL (ref 0.6–1.3)
DIFFERENTIAL METHOD BLD: ABNORMAL
EOSINOPHIL # BLD: 6.8 K/UL (ref 0–0.4)
EOSINOPHIL NFR BLD: 23 % (ref 0–5)
ERYTHROCYTE [DISTWIDTH] IN BLOOD BY AUTOMATED COUNT: 15.6 % (ref 11.6–14.5)
GLUCOSE BLD STRIP.AUTO-MCNC: 103 MG/DL (ref 70–110)
GLUCOSE BLD STRIP.AUTO-MCNC: 74 MG/DL (ref 70–110)
GLUCOSE BLD STRIP.AUTO-MCNC: 86 MG/DL (ref 70–110)
GLUCOSE BLD STRIP.AUTO-MCNC: 93 MG/DL (ref 70–110)
GLUCOSE SERPL-MCNC: 72 MG/DL (ref 74–99)
HCT VFR BLD AUTO: 36.4 % (ref 35–45)
HGB BLD-MCNC: 12.2 G/DL (ref 12–16)
LYMPHOCYTES # BLD: 8.6 K/UL (ref 0.8–3.5)
LYMPHOCYTES NFR BLD: 29 % (ref 20–51)
MAGNESIUM SERPL-MCNC: 1.3 MG/DL (ref 1.6–2.6)
MCH RBC QN AUTO: 29.5 PG (ref 24–34)
MCHC RBC AUTO-ENTMCNC: 33.5 G/DL (ref 31–37)
MCV RBC AUTO: 88.1 FL (ref 74–97)
METAMYELOCYTES NFR BLD MANUAL: 2 %
MONOCYTES # BLD: 2.1 K/UL (ref 0–1)
MONOCYTES NFR BLD: 7 % (ref 2–9)
NEUTS BAND NFR BLD MANUAL: 3 % (ref 0–5)
NEUTS SEG # BLD: 10.7 K/UL (ref 1.8–8)
NEUTS SEG NFR BLD: 36 % (ref 42–75)
PHOSPHATE SERPL-MCNC: 2.2 MG/DL (ref 2.5–4.9)
PLATELET # BLD AUTO: 210 K/UL (ref 135–420)
PLATELET COMMENTS,PCOM: ABNORMAL
PMV BLD AUTO: 9.7 FL (ref 9.2–11.8)
POTASSIUM SERPL-SCNC: 3.3 MMOL/L (ref 3.5–5.5)
RBC # BLD AUTO: 4.13 M/UL (ref 4.2–5.3)
RBC MORPH BLD: ABNORMAL
SODIUM SERPL-SCNC: 144 MMOL/L (ref 136–145)
WBC # BLD AUTO: 29.6 K/UL (ref 4.6–13.2)
WBC MORPH BLD: ABNORMAL

## 2018-01-22 PROCEDURE — 82962 GLUCOSE BLOOD TEST: CPT

## 2018-01-22 PROCEDURE — 76937 US GUIDE VASCULAR ACCESS: CPT

## 2018-01-22 PROCEDURE — 74011250636 HC RX REV CODE- 250/636: Performed by: RADIOLOGY

## 2018-01-22 PROCEDURE — 74011250636 HC RX REV CODE- 250/636: Performed by: HOSPITALIST

## 2018-01-22 PROCEDURE — 74011000250 HC RX REV CODE- 250: Performed by: HOSPITALIST

## 2018-01-22 PROCEDURE — 74011000250 HC RX REV CODE- 250: Performed by: INTERNAL MEDICINE

## 2018-01-22 PROCEDURE — 85025 COMPLETE CBC W/AUTO DIFF WBC: CPT | Performed by: HOSPITALIST

## 2018-01-22 PROCEDURE — 83735 ASSAY OF MAGNESIUM: CPT | Performed by: HOSPITALIST

## 2018-01-22 PROCEDURE — 80048 BASIC METABOLIC PNL TOTAL CA: CPT | Performed by: HOSPITALIST

## 2018-01-22 PROCEDURE — 74011250637 HC RX REV CODE- 250/637: Performed by: HOSPITALIST

## 2018-01-22 PROCEDURE — 36415 COLL VENOUS BLD VENIPUNCTURE: CPT | Performed by: HOSPITALIST

## 2018-01-22 PROCEDURE — 65660000000 HC RM CCU STEPDOWN

## 2018-01-22 PROCEDURE — 74011250636 HC RX REV CODE- 250/636: Performed by: INTERNAL MEDICINE

## 2018-01-22 PROCEDURE — 84100 ASSAY OF PHOSPHORUS: CPT | Performed by: INTERNAL MEDICINE

## 2018-01-22 PROCEDURE — 74011250637 HC RX REV CODE- 250/637: Performed by: INTERNAL MEDICINE

## 2018-01-22 RX ORDER — HEPARIN SODIUM (PORCINE) LOCK FLUSH IV SOLN 100 UNIT/ML 100 UNIT/ML
500 SOLUTION INTRAVENOUS
Status: COMPLETED | OUTPATIENT
Start: 2018-01-22 | End: 2018-01-22

## 2018-01-22 RX ORDER — HEPARIN SODIUM 200 [USP'U]/100ML
500 INJECTION, SOLUTION INTRAVENOUS
Status: DISCONTINUED | OUTPATIENT
Start: 2018-01-22 | End: 2018-01-22

## 2018-01-22 RX ORDER — MAGNESIUM SULFATE HEPTAHYDRATE 40 MG/ML
2 INJECTION, SOLUTION INTRAVENOUS
Status: DISPENSED | OUTPATIENT
Start: 2018-01-22 | End: 2018-01-22

## 2018-01-22 RX ORDER — ENOXAPARIN SODIUM 100 MG/ML
40 INJECTION SUBCUTANEOUS EVERY 24 HOURS
Status: DISCONTINUED | OUTPATIENT
Start: 2018-01-22 | End: 2018-01-26 | Stop reason: HOSPADM

## 2018-01-22 RX ORDER — HEPARIN SODIUM 200 [USP'U]/100ML
500 INJECTION, SOLUTION INTRAVENOUS
Status: COMPLETED | OUTPATIENT
Start: 2018-01-22 | End: 2018-01-22

## 2018-01-22 RX ORDER — HEPARIN SODIUM (PORCINE) LOCK FLUSH IV SOLN 100 UNIT/ML 100 UNIT/ML
500 SOLUTION INTRAVENOUS
Status: DISCONTINUED | OUTPATIENT
Start: 2018-01-22 | End: 2018-01-22

## 2018-01-22 RX ORDER — LIDOCAINE HYDROCHLORIDE 10 MG/ML
1-20 INJECTION INFILTRATION; PERINEURAL
Status: DISCONTINUED | OUTPATIENT
Start: 2018-01-22 | End: 2018-01-22 | Stop reason: SDUPTHER

## 2018-01-22 RX ORDER — POTASSIUM CHLORIDE 20 MEQ/1
40 TABLET, EXTENDED RELEASE ORAL
Status: COMPLETED | OUTPATIENT
Start: 2018-01-22 | End: 2018-01-22

## 2018-01-22 RX ORDER — LIDOCAINE HYDROCHLORIDE 10 MG/ML
1-20 INJECTION INFILTRATION; PERINEURAL
Status: COMPLETED | OUTPATIENT
Start: 2018-01-22 | End: 2018-01-22

## 2018-01-22 RX ADMIN — MAGNESIUM SULFATE HEPTAHYDRATE 2 G: 40 INJECTION, SOLUTION INTRAVENOUS at 13:13

## 2018-01-22 RX ADMIN — ASPIRIN 325 MG ORAL TABLET 325 MG: 325 PILL ORAL at 09:14

## 2018-01-22 RX ADMIN — METRONIDAZOLE 500 MG: 500 INJECTION, SOLUTION INTRAVENOUS at 22:31

## 2018-01-22 RX ADMIN — HEPARIN SODIUM (PORCINE) LOCK FLUSH IV SOLN 100 UNIT/ML 500 UNITS: 100 SOLUTION at 12:35

## 2018-01-22 RX ADMIN — SUCRALFATE 1 G: 1 TABLET ORAL at 13:13

## 2018-01-22 RX ADMIN — LEVOFLOXACIN 750 MG: 5 INJECTION, SOLUTION INTRAVENOUS at 13:57

## 2018-01-22 RX ADMIN — SUCRALFATE 1 G: 1 TABLET ORAL at 09:15

## 2018-01-22 RX ADMIN — NYSTATIN: 100000 POWDER TOPICAL at 09:14

## 2018-01-22 RX ADMIN — LOSARTAN POTASSIUM 100 MG: 50 TABLET ORAL at 09:14

## 2018-01-22 RX ADMIN — SUCRALFATE 1 G: 1 TABLET ORAL at 17:36

## 2018-01-22 RX ADMIN — ENOXAPARIN SODIUM 40 MG: 40 INJECTION SUBCUTANEOUS at 13:12

## 2018-01-22 RX ADMIN — SODIUM BICARBONATE 650 MG TABLET 325 MG: at 09:14

## 2018-01-22 RX ADMIN — METRONIDAZOLE 500 MG: 250 TABLET ORAL at 06:34

## 2018-01-22 RX ADMIN — VANCOMYCIN HYDROCHLORIDE 1250 MG: 10 INJECTION, POWDER, LYOPHILIZED, FOR SOLUTION INTRAVENOUS at 15:18

## 2018-01-22 RX ADMIN — NORTRIPTYLINE HYDROCHLORIDE 25 MG: 25 CAPSULE ORAL at 22:30

## 2018-01-22 RX ADMIN — LIDOCAINE HYDROCHLORIDE 2 ML: 10 INJECTION, SOLUTION INFILTRATION; PERINEURAL at 12:28

## 2018-01-22 RX ADMIN — NYSTATIN: 100000 POWDER TOPICAL at 22:32

## 2018-01-22 RX ADMIN — SUCRALFATE 1 G: 1 TABLET ORAL at 22:30

## 2018-01-22 RX ADMIN — Medication 1000 UNITS: at 12:28

## 2018-01-22 RX ADMIN — FAMOTIDINE 20 MG: 20 TABLET ORAL at 09:14

## 2018-01-22 RX ADMIN — POTASSIUM CHLORIDE 40 MEQ: 20 TABLET, EXTENDED RELEASE ORAL at 13:12

## 2018-01-22 RX ADMIN — FAMOTIDINE 20 MG: 20 TABLET ORAL at 22:30

## 2018-01-22 RX ADMIN — METRONIDAZOLE 500 MG: 500 INJECTION, SOLUTION INTRAVENOUS at 15:09

## 2018-01-22 RX ADMIN — SODIUM BICARBONATE 650 MG TABLET 325 MG: at 21:00

## 2018-01-22 NOTE — PROGRESS NOTES
NUTRITION FOLLOW-UP    RECOMMENDATIONS/PLAN:   - Check Phos-If Phos is normal recc to remove renal diet restriction to promote PO intake    Monitor labs/lytes, PO intakes, skin integrity, wt, fluid status, BM  NUTRITION ASSESSMENT:   Client Update: 81 yrs old Female with sepsis , sandra on ckds , cellulitis , proctitis. Per MD pt will be getting CT of thigh at some point. FOOD/NUTRITION INTAKE   Diet Order:  Renal, AHA  Supplements: Ensure Clear TID    Food Allergies: NKFA  Average PO Intake:      Patient Vitals for the past 100 hrs:   % Diet Eaten   01/21/18 1915 10 %   01/21/18 1254 10 %   01/21/18 0955 50 %   01/20/18 2246 25 %   01/20/18 1310 10 %   01/20/18 0904 25 %   01/18/18 1119 240 %   01/18/18 0730 0 %      Pertinent Medications:  [x] Reviewed; pepcid, mycostatin, carafate   Electrolyte Replacement Protocol: [x]K [x]Mg [x]PO4  Insulin:  []SSI  [x]Pre-meal   []Basal    []Drip  []None  Cultural/Oriental orthodox Food Preferences: None Identified    BIOCHEMICAL DATA & MEDICAL TESTS  Pertinent Labs:  [x] Reviewed; K-3.3, Mg-1.3 Glucose-72   ANTHROPOMETRICS  Height: 4' 11\" (149.9 cm)       Weight: 78.7 kg (173 lb 6.4 oz)         BMI: 35 kg/m^2 obese (30%-39.9% BMI)   Adm Weight: 172 lbs                Weight change: +1lbs  Adjusted Body Weight: 53kg     NUTRITION-FOCUSED PHYSICAL ASSESSMENT  Skin: No pressure injury noted      GI: +BM 1/21/18    NUTRITION PRESCRIPTION  Calories: 4032-4049 kcal/day based on 25-30kcal/kg of IBW  Protein:63-78 g/day based on 0.8-1.0 g/kg ABW  CHO: 139-167 g/day based on 50% of total energy  Fluid: 3893-7137 ml/day based on 1 kcal/ml       NUTRITION DIAGNOSES:   1. Inadequate oral intake related to personal preference as evidence by 0% Po intake at most meals. NUTRITION INTERVENTIONS:   INTERVENTIONS:        GOALS:  1. Check Phos levels 1.  Encourage PO intake >50% at most meals by next review 1 days             LEARNING NEEDS (Diet, Supplementation, Food/Nutrient-Drug Interaction): [x] None Identified   [] Education provided/documented      Identified and patient: [] Declined   [] Was not appropriate/indicated        NUTRITION MONITORING /EVALUATION:   Follow PO intake  Monitor wt  Monitor renal labs, electrolytes, fluid status  Monitor for additional supplement needs     Previous Recommendations Implemented: yes        Previous Goals Met:  no -PO intakes remain low      [] Participated in Interdisciplinary Rounds    [x] Interdisciplinary Care Plan Reviewed  DISCHARGE NUTRITION RECOMMENDATIONS ADDRESSED:      [x] To be determined closer to discharge    NUTRITION RISK:           [x] At risk                        [] Not currently at risk        Will follow-up per policy.   Monalisa Pompa RD  PAGER:  [phone]

## 2018-01-22 NOTE — PROGRESS NOTES
Problem: Mobility Impaired (Adult and Pediatric)  Goal: *Acute Goals and Plan of Care (Insert Text)  Physical Therapy Goals  Initiated 1/18/2018 and to be accomplished within 3-7 day(s)  1. Patient will move from supine to sit and sit to supine  in bed with supervision/set-up. 2.  Patient will transfer from bed to chair and chair to bed with supervision/set-up using the least restrictive device. 3.  Patient will perform sit to stand with supervision/set-up. 4.  Patient will ambulate with supervision/set-up for 150 feet with the least restrictive device. 5.  Patient will ascend/descend 1 stairs without handrail(s) with minimal assistance/contact guard assist.   Pt refused PT due to:  []  Nausea/vomiting  []  Eating  []  Pain  [x]  Pt lethargic. \" I just got back in bed from the restroom. I can't do it today, I'm sorry. I've had a very busy day\"   []  Off Unit  Will f/u tomorrow. Thank you.   Allegra Son, PTA

## 2018-01-22 NOTE — PROGRESS NOTES
Problem: Falls - Risk of  Goal: *Absence of Falls  Document Santiago Fall Risk and appropriate interventions in the flowsheet.    Outcome: Progressing Towards Goal  Fall Risk Interventions:  Mobility Interventions: Assess mobility with egress test, OT consult for ADLs, Patient to call before getting OOB, PT Consult for mobility concerns, Utilize walker, cane, or other assitive device    Mentation Interventions: Adequate sleep, hydration, pain control    Medication Interventions: Assess postural VS orthostatic hypotension, Patient to call before getting OOB, Teach patient to arise slowly    Elimination Interventions: Call light in reach, Elevated toilet seat, Patient to call for help with toileting needs

## 2018-01-22 NOTE — PROGRESS NOTES
Problem: Self Care Deficits Care Plan (Adult)  Goal: *Acute Goals and Plan of Care (Insert Text)  Occupational Therapy Goals  Initiated 1/18/2018 within 7 day(s). 1.  Patient will perform lower body dressing with  moderate assistance  while sitting on EOB  2. Patient will perform grooming with supervision/set-up while standing at sink. 3.  Patient will perform toilet transfers with  minimal assistance/contact guard assist.  4.  Patient will perform all aspects of toileting with  minimal assistance/contact guard assist.  5.  Patient will perform upper extremity therapeutic exercise/activities with  supervision/set-up for 15 minutes. 6.  Patient will utilize energy conservation techniques during functional activities with verbal cue(s). Occupational Therapy Treatment Attempt    Chart reviewed. Attempted Occupational Therapy Treatment, however, patient unable to be seen due to:  []  Nausea/vomiting  []  Eating  []  Pain  []  Patient too lethargic  [x]  Off Unit for testing/procedure/Angio  []  Dialysis treatment in progress  []  Telemetry Results  []  Other:     Will f/u later as patient's schedule allows.    Thank you for this referral.  Brianne Guthrie, OTR/L

## 2018-01-22 NOTE — PROGRESS NOTES
1915: Assumed patient care, she was alert and oriented to person, place, time, but was disoriented to situation. Respiratory status was stable on room air. Vital signs were stable. MEWS score was a three . Patient denied any nausea vomiting dizziness or anxiety. White board was updated and explained. Bed was locked and in lowest position. Call bell, water and personal belongings were within reach. Patient had no questions, comments or concerns after bedside shift report. 0700: Patient had an uneventful shift. Respiratory status, vital signs and MEWS score remained stable. Patient was resting quietly with no signs of distress noted. Bed locked and in lowest position. Call bell water and personal belongings were within reach. Patient had no questions, comments or concerns after bedside shift report. Bedside report given to RENETTA Mcleod R.N.

## 2018-01-22 NOTE — PROGRESS NOTES
Problem: Falls - Risk of  Goal: *Absence of Falls  Document Santiago Fall Risk and appropriate interventions in the flowsheet.    Outcome: Progressing Towards Goal  Fall Risk Interventions:  Mobility Interventions: Assess mobility with egress test, Bed/chair exit alarm, Communicate number of staff needed for ambulation/transfer, Patient to call before getting OOB, Utilize walker, cane, or other assitive device    Mentation Interventions: Adequate sleep, hydration, pain control, Bed/chair exit alarm, More frequent rounding, Reorient patient, Toileting rounds, Update white board    Medication Interventions: Bed/chair exit alarm, Patient to call before getting OOB    Elimination Interventions: Bed/chair exit alarm, Call light in reach, Patient to call for help with toileting needs, Toileting schedule/hourly rounds

## 2018-01-22 NOTE — PROGRESS NOTES
[facility] care of pt at this time. Pt in bed with no signs of distress. Pt left with call light within reach and encouraged to call for assistance. 1510 Head to toe assessment completed at this time  Patient is A&OX4. Pt denies N/V chest pain and SOB or distress. Pt is calm and cooperative. Pedal pulses are present. Capillary refill less than 3 seconds. Skin in warm and dry  With blister on rt inner thigh. Lungs are clear bilaterally. Bowel sounds are active. Abdomen is soft and non-tender. PICC line on LT basillic. Pain scale explained to patient. Reasons for taking PRN meds explained to patient. Patient instructed to call for prn when needed. Pain level is 0. Patient was oriented to call bell and bed function.  Will continue to monitor    1720 pt resting quietly on bed no complain and concern at this time

## 2018-01-22 NOTE — PROGRESS NOTES
Chart reviewed, met with pt and family, attended IDR. Pt plans discharge home, daughter in room agrees and is available if needed, but she does work. Pt had chosen [facility], but pt spouse states pt is part of [facility]  unable to verify with [employer]. Pt family requesting rollator, transfer bench for shower, will follow up for order. Pt and family aware that requested DME will likely not be available at discharge. Pt does have standard walker for use at home if needed. PICC placed, but MD not anticipating discharge home with IV tx.

## 2018-01-22 NOTE — PROGRESS NOTES
[address] [facility] [address]  Phone [phone] Fax [phone]  Pulmonary Critical Care & Sleep Medicine       Name: Giorgi Eugene MRN: [id number]   : 1945 Hospital: [facility] [place]   Date: 2018        Pulmonary Follow Up    IMPRESSION:   Patient Active Problem List   Diagnosis Code    Other and unspecified noninfectious gastroenteritis and colitis(558.9) K52.9    Rectal bleeding K62.5    HTN (hypertension) I10    Unspecified constipation K59.00    Constipation K59.00    Ischemic colitis ([facility] [address] K55.9    Diverticulosis of colon K57.30    Internal hemorrhoids K64.8    Cellulitis of abdominal wall L03.311    Cellulitis of groin L03.314    UTI (urinary tract infection) N39.0    Renal insufficiency N28.9    Sepsis (HCC) A41.9    GERD (gastroesophageal reflux disease) K21.9    HLD (hyperlipidemia) E78.5    Moderate dehydration Z56.0    Diastolic CHF, chronic (HCC) I50.32    SIRS (systemic inflammatory response syndrome) ([facility]) R65.10    Abdominal wall cellulitis L03.311    Proctitis K62.89    Acute encephalopathy G93.40    Rash R21    Hypotension I95.9    Severe sepsis (HCC) A41.9, R65.20    Leukocytosis D72.829    Hypokalemia E87.6    Hypomagnesemia E83.42 ·   Possible pyelonephritis Abd wall cellulitis resulting in sepsis  · Rash and itching -? Allergy to medication. Improving  · Mild renal insufficiency due to ATN and hypotension improved  · Lactic acidosis due to sepsis improved  · Elevated INR -improved  · H/o Proctitis not seen on current CT  · Large Hiatal hernia   PLAN:  = Doing well with hemodynamic stability, on RA  = WBC improving and likely steroid related, no abscess on thigh CT  = On flagyl, Levofloxacin and Vancomycin for cellulitis and proctitis probably.  No PNA, diarrhoea  = Patient is progressing well.   = OT and PT   = Will sign off and be available as needed   ADVANCE DIRECTIVE:Full code  I spent 25  min of time excluding procedure, with face to face evaluation. · Labs and images personally seen and available reports reviewed. · All current medicines are reviewed and doses and prescription adjusted. Subjective/History:   Lily Oconnor is a 81 y.o. female who comes to the ed with complaints of abd pain. Patient stated she has been having abd pain for the past week or so but it was very severe today therefore came to the ed. Due to this pain, she has not been eating and drinking much. She states pain is on the outside of the abd on the skin pain, its tender and warm to touch. It sharp and constant in nature with 10/10 when worst. No nausea vomiting and other complaints. Denies any fevers, chills, urinary symptoms at home. Does report of dry mouth. Takes her meds as prescribed. In the ED she was noted to have elevated WBC, HR and Cr. CT A/P done did not show any acute findings. On physical exam she was noted to have erythematous region of her lower abd.     1/22/18  Doing well. BP is stable on RA  She had PICC line placement. WBC is better and remained on antibiotics  She denies HA, neck stiffness, photophobia, sore throat, sinus pain or discharge, cough, phlegm, dysuria, diarrhea, nausea, vomiting, calf tenderness, vaginal discharge. Prior to Admission medications    Medication Sig Start Date End Date Taking? Authorizing Provider   sucralfate (CARAFATE) 1 gram tablet Take 1 Tab by mouth four (4) times daily. 1/1/17  Yes Chalino Yost MD   nortriptyline (PAMELOR) 25 mg capsule Take 25 mg by mouth nightly. Yes Ghada Solitario MD   lovastatin (MEVACOR) 40 mg tablet Take 40 mg by mouth nightly. Yes Ghada Solitario MD   cyclobenzaprine (FLEXERIL) 10 mg tablet Take 10 mg by mouth three (3) times daily as needed for Muscle Spasm(s). Yes Ghada Solitario MD   aspirin (ASPIRIN) 325 mg tablet Take 325 mg by mouth daily.      Yes Ghada Solitario MD   losartan (COZAAR) 100 mg tablet Take 100 mg by mouth daily. Yes Ghada Solitario MD   famotidine (PEPCID) 20 mg tablet Take 20 mg by mouth two (2) times a day. Yes Ghada Solitario MD   ezetimibe (ZETIA) 10 mg tablet Take 10 mg by mouth nightly.    Yes Ghada Solitario MD     Current Facility-Administered Medications   Medication Dose Route Frequency    enoxaparin (LOVENOX) injection 40 mg  40 mg SubCUTAneous Q24H    levoFLOXacin (LEVAQUIN) 750 mg in D5W IVPB  750 mg IntraVENous Q24H    metroNIDAZOLE (FLAGYL) IVPB premix 500 mg  500 mg IntraVENous Q8H    famotidine (PEPCID) tablet 20 mg  20 mg Oral BID    losartan (COZAAR) tablet 100 mg  100 mg Oral DAILY    vancomycin (VANCOCIN) 1250 mg in D5W 250 mL infusion  1,250 mg IntraVENous Q24H    sodium bicarbonate tablet 325 mg  325 mg Oral BID    insulin lispro (HUMALOG) injection   SubCUTAneous AC&HS    aspirin (ASPIRIN) tablet 325 mg  325 mg Oral DAILY    nortriptyline (PAMELOR) capsule 25 mg  25 mg Oral QHS    sucralfate (CARAFATE) tablet 1 g  1 g Oral QID    nystatin (MYCOSTATIN) 100,000 unit/gram powder   Topical BID    Vancomycin - Pharmacy to dose  1 Each Other Rx Dosing/Monitoring       Objective:   Vital Signs:    Visit Vitals    /80 (BP 1 Location: Right arm, BP Patient Position: At rest;Supine)    Pulse (!) 101    Temp 97.7 °F (36.5 °C)    Resp 18    Ht 4' 11\" (1.499 m)    Wt 78.7 kg (173 lb 6.4 oz)    SpO2 100%    BMI 35.02 kg/m2       O2 Device: Room air       Temp (24hrs), Av.2 °F (36.8 °C), Min:97.3 °F (36.3 °C), Max:98.8 °F (37.1 °C)       Intake/Output:   Last shift:       07 - 0  In: 240 [P.O.:240]  Out: -   Last 3 shifts: 1901 -  0700  In: 1200 [P.O.:1200]  Out: 1200 [Urine:1200]    Intake/Output Summary (Last 24 hours) at 18 1521  Last data filed at 18 0912   Gross per 24 hour   Intake              720 ml   Output                0 ml   Net              720 ml       Review of Systems:  HEENT: No epistaxis, no nasal drainage, no difficulty in swallowing, no redness in eyes  Respiratory: as above  Cardiovascular: no chest pain, no palpitations, no chronic leg edema, no syncope  Gastrointestinal: no abd pain, no vomiting, no diarrhea, no bleeding symptoms  Genitourinary: No urinary symptoms or hematuria    Objective:  General: comfortable, no acute distress  HEENT: pupils reactive, sclera anicteric, EOM intact  Neck: No thyroid swelling, no lymphadenopathy or JVD, supple  CVS: S1S2 no murmurs  RS: Mod AE bilaterally, No rales no wheeze  Abd: obese, soft, tenderness on right lower abd  Neuro: non focal, awake, alert  Extrm: trace leg edema, no clubbing or cyanosis  Lymph node: No obvious palpable lymph node appreciated. Skin: Diffuse rash all over both legs improving    CBC w/Diff Recent Labs      01/22/18   0340  01/21/18   0832  01/20/18   0605   WBC  29.6*  31.1*  27.2*   RBC  4.13*  3.83*  3.95*   HGB  12.2  11.6*  11.7*   HCT  36.4  33.3*  34.6*   PLT  210  219  195   GRANS  36*  42  48   LYMPH  29  26  37   EOS  23*  19*  6*        Chemistry Recent Labs      01/22/18   0432  01/22/18   0340  01/21/18   0832  01/20/18   0605  01/19/18   1918   GLU   --   72*  82  124*   --    NA   --   144  143  139   --    K   --   3.3*  3.7  3.9   --    CL   --   109*  110*  108   --    CO2   --   25  23  21   --    BUN   --   14  22*  31*   --    CREA   --   0.95  0.96  1.04   --    CA   --   8.5  8.3*  7.8*   --    MG   --   1.3*  1.6  1.9  2.0   PHOS  2.2*   --    --   2.1*  1.5*   AGAP   --   10  10  10   --    BUCR   --   15  23*  30*   --         Lactic Acid Lactic acid   Date Value Ref Range Status   01/19/2018 1.9 0.4 - 2.0 MMOL/L Final     No results for input(s): LAC in the last 72 hours. Micro  No results for input(s): SDES, CULT in the last 72 hours. No results for input(s): CULT in the last 72 hours. ABG No results for input(s): PHI, PHI, POC2, PCO2I, PO2, PO2I, HCO3, HCO3I, FIO2, FIO2I in the last 72 hours.      Liver Enzymes Protein, total   Date Value Ref Range Status   01/18/2018 3.5 (L) 6.4 - 8.2 g/dL Final     Albumin   Date Value Ref Range Status   01/18/2018 1.1 (L) 3.4 - 5.0 g/dL Final     Globulin   Date Value Ref Range Status   01/18/2018 2.4 2.0 - 4.0 g/dL Final     A-G Ratio   Date Value Ref Range Status   01/18/2018 0.5 (L) 0.8 - 1.7   Final     AST (SGOT)   Date Value Ref Range Status   01/18/2018 85 (H) 15 - 37 U/L Final     Alk. phosphatase   Date Value Ref Range Status   01/18/2018 123 (H) 45 - 117 U/L Final     No results for input(s): TP, ALB, GLOB, AGRAT, SGOT, GPT, AP, TBIL in the last 72 hours. No lab exists for component: DBIL     Cardiac Enzymes No results found for: CPK, CK, CKMMB, CKMB, RCK3, CKMBT, CKNDX, CKND1, CHRISTINA, TROPT, TROIQ, BRANDON, TROPT, TNIPOC, BNP, BNPP     BNP No results found for: BNP, BNPP, XBNPT     Coagulation No results for input(s): PTP, INR, APTT in the last 72 hours. No lab exists for component: INREXT, INREXT      Thyroid  No results found for: T4, T3U, TSH, TSHEXT, TSHEXT       Lipid Panel No results found for: CHOL, CHOLPOCT, CHOLX, CHLST, CHOLV, 196757, HDL, LDL, LDLC, DLDLP, 924543, VLDLC, VLDL, TGLX, TRIGL, TRIGP, TGLPOCT, CHHD, CHHDX       Urinalysis Lab Results   Component Value Date/Time    Color DARK YELLOW 01/15/2018 07:50 PM    Appearance CLOUDY 01/15/2018 07:50 PM    Specific gravity 1.026 01/15/2018 07:50 PM    pH (UA) 5.0 01/15/2018 07:50 PM    Protein TRACE 01/15/2018 07:50 PM    Glucose NEGATIVE  01/15/2018 07:50 PM    Ketone TRACE 01/15/2018 07:50 PM    Bilirubin MODERATE 01/15/2018 07:50 PM    Urobilinogen 1.0 01/15/2018 07:50 PM    Nitrites POSITIVE 01/15/2018 07:50 PM    Leukocyte Esterase SMALL 01/15/2018 07:50 PM    Epithelial cells FEW 01/15/2018 07:50 PM    Bacteria 1+ 01/15/2018 07:50 PM    WBC 0 to 3 01/15/2018 07:50 PM    RBC 0 to 1 01/15/2018 07:50 PM        XR (Most Recent).  CXR reviewed by me and compared with previous CXR   Results from [facility] Encounter encounter on 01/15/18   XR CHEST PORT   Narrative EXAM: Portable upright chest, one view    INDICATION: Hypoxia    COMPARISON: Chest x-ray 1/18/2018, CT abdomen 1/18/2018    _______________    FINDINGS:    Right jugular central venous catheter tip projects at the distal SVC. Cardiac silhouette and pulmonary vascularity are normal. Slight interval  improvement of right basilar subsegmental atelectasis. Eventration of the right  hemidiaphragm is unchanged. Hazy opacity is present across the left hemithorax  base without change most or all of which is secondary to the patient's known  hiatal hernia. Adjacent atelectasis not excluded.    _______________         Impression IMPRESSION:    Slight interval improvement of right basilar subsegmental atelectasis. Hiatal  hernia plus or minus adjacent left basilar atelectasis without change. CT (Most Recent)   Results from Hospital Encounter encounter on 01/15/18   CT FEMUR RT WO CONT   Narrative EXAM: CT right femur    INDICATION: Right thigh cellulitis    COMPARISON: None. TECHNIQUE: Axial CT imaging of the right lower extremity was performed without  intravenous contrast.    Dose reduction techniques used: Automated exposure control, adjustment of the  mAs and/or kVp according to patient's size, and iterative reconstruction  techniques. The specific techniques utilized on this CT exam have been  documented in the patient's electronic medical record.      _______________    FINDINGS:    There is diffuse subcutaneous inflammatory stranding extending from the right  hip to the anterior thigh above the knee. There is no fluid collection or  abscess identified. There is no bony erosion or fracture. Mild atherosclerosis  is noted.  A dystrophic calcified structure is seen medial to the femur and the  proximal thigh.      _______________         Impression IMPRESSION:    Right hip and anterior thigh cellulitis without evidence of abscess         EKG No results found for this or any previous visit. ECHO  1/19/18 Left ventricle: Systolic function was hyperdynamic by EF (biplane method of disks). Ejection fraction was estimated to be 72 % in the range of 70 % to 75 %. There were no regional wall motion    abnormalities. Doppler parameters were consistent with abnormal left ventricular relaxation (grade 1 diastolic dysfunction). Left atrium: The atrium was mildly dilated. Mitral valve: There was mild annular calcification. Tricuspid valve: There was mild pulmonary hypertension. PFT No flowsheet data found.      Other          Imaging:  I have personally reviewed the patients radiographs and have reviewed the reports:            Kannan Diaz MD   Pulmonary Critical Care & Sleep Medicine

## 2018-01-22 NOTE — PROGRESS NOTES
Hospitalist Progress Note-critical care note     Patient: Letty Coleman MRN: [id number]  CSN: [id number]    YOB: 1945  Age: 81 y.o.   Sex: female    DOA: 1/15/2018 LOS:  LOS: 7 days            Chief complaint:sepsis , sandra on ckds , cellulitis , proctitis , hypotension, severe sepsis , acute encephalopathy     Assessment/Plan         Hospital Problems  Date Reviewed: 1/15/2018          Codes Class Noted POA    Hypokalemia ICD-10-CM: E87.6  ICD-9-CM: 276.8  1/22/2018 Unknown        Hypomagnesemia ICD-10-CM: E83.42  ICD-9-CM: 275.2  1/22/2018 Unknown        Leukocytosis ICD-10-CM: D72.829  ICD-9-CM: 288.60  1/21/2018 Unknown        Acute encephalopathy ICD-10-CM: G93.40  ICD-9-CM: 348.30  1/18/2018 Unknown        Rash ICD-10-CM: R21  ICD-9-CM: 782.1  1/18/2018 Unknown        Hypotension ICD-10-CM: I95.9  ICD-9-CM: 458.9  1/18/2018 Unknown        Severe sepsis ([address] ICD-10-CM: A41.9, R65.20  ICD-9-CM: 038.9, 995.92  1/18/2018 Unknown        Proctitis ICD-10-CM: K62.89  ICD-9-CM: 569.49  1/17/2018 Unknown        * (Principal)Cellulitis of abdominal wall ICD-10-CM: S76.063  ICD-9-CM: 682.2  1/15/2018 Unknown        Cellulitis of groin ICD-10-CM: L03.314  ICD-9-CM: 682.2  1/15/2018 Unknown        UTI (urinary tract infection) ICD-10-CM: N39.0  ICD-9-CM: 599.0  1/15/2018 Unknown        Renal insufficiency ICD-10-CM: N28.9  ICD-9-CM: 593.9  1/15/2018 Unknown        Sepsis ([address] ICD-10-CM: A41.9  ICD-9-CM: 038.9, 995.91  1/15/2018 Unknown        GERD (gastroesophageal reflux disease) ICD-10-CM: K21.9  ICD-9-CM: 530.81  1/15/2018 Unknown        HLD (hyperlipidemia) ICD-10-CM: E78.5  ICD-9-CM: 272.4  1/15/2018 Unknown        Moderate dehydration ICD-10-CM: E86.0  ICD-9-CM: 276.51  1/15/2018 Unknown        Diastolic CHF, chronic (HCC) ICD-10-CM: I50.32  ICD-9-CM: 428.32, 428.0  1/15/2018 Unknown        SIRS (systemic inflammatory response syndrome) ([facility] ICD-10-CM: R65.10  ICD-9-CM: 995.90  1/15/2018 Unknown Abdominal wall cellulitis ICD-10-CM: J62.206  ICD-9-CM: 682.2  1/15/2018 Unknown        HTN (hypertension) ICD-10-CM: I10  ICD-9-CM: 401.9  11/4/2013 Yes            1. Severe Sepsis with associated Lactic Acidosis in the setting of fever, UTI, abdominal wall cellulitis and cellulitis from groining area   Wbc improving   Echo : ef wnl no vegetation noted   Repeated ct no fluid collection   Repeated bcx so far negative       2. Leukocytosis   Improving, will continue monitoring   Reported some loose stool and add flagyl    Cellulitis improving clinically   No abscess on ct         2. Abdominal Wall cellulitis- bacterial and/or fungal and cellulitis of groin   Improving of abdomen wall site  Groin site improving also   Continue abx   Wound care   no abscess per ct scan     3. UTI  Continue abx   cx so far negative     4. Acute Kidney Injury on ckd3  Cr back to base line   5. Moderate Dehydration   6. Diastolic CHF, chronic, compensated   7. HTN  Restart home medication   8. GERD  ppi   9. HLD  Statin     11. ?Proctitis, not presented per repeated ct   12 acute encephalopathy   Back to her baseline     13 rash (poa)  resolving,   14 hypokalemia and hypomagnesemia   K and mg replacement     Loss iv access,-difficult to get PIV, will have picc today      Subjective: feel better   Nurse: K and mg low. Daughter and  were at the bedside. All questions have been answered. 35 total min's spent on patient care including >50% on counseling/coordinating care. Discussed the above assessments. also discussed labs, medications and hospital course      Review of systems:    General: No fevers or chills. Cardiovascular: No chest pain or pressure. No palpitations. Pulmonary: No shortness of breath. Gastrointestinal: No nausea, vomiting.      Vital signs/Intake and Output:  Visit Vitals    /79 (BP 1 Location: Left leg, BP Patient Position: At rest;Supine)    Pulse (!) 107    Temp 98.8 °F (37.1 °C)    Resp 18     4' 11\" (1.499 m)    Wt 78.7 kg (173 lb 6.4 oz)    SpO2 100%    BMI 35.02 kg/m2     Current Shift:  01/22 0701 - 01/22 1900  In: 240 [P.O.:240]  Out: -   Last three shifts:  01/20 1901 - 01/22 0700  In: 1200 [P.O.:1200]  Out: 1200 [Urine:1200]    Physical Exam:  General: WD, WN. Alert, cooperative, no acute distress    HEENT: NC, Atraumatic. PERRLA, anicteric sclerae. Lungs: CTA Bilaterally. No Wheezing/Rhonchi/Rales. Heart:  Tachy ,  No murmur, No Rubs, No Gallops  Abdomen: Soft, Non distended, Non tender.  +Bowel sounds, erythema /swelling-lower of abdomen improving, no tenderness  , blisters noted on rt thigh and tenderness   Extremities: No c/c/e  Psych:   Not anxious or agitated. Neurologic:  No acute neurological deficit. Derm: peeling skin noted on face         Labs: Results:       Chemistry Recent Labs      01/22/18   0340  01/21/18   0832  01/20/18   0605   GLU  72*  82  124*   NA  144  143  139   K  3.3*  3.7  3.9   CL  109*  110*  108   CO2  25  23  21   BUN  14  22*  31*   CREA  0.95  0.96  1.04   CA  8.5  8.3*  7.8*   AGAP  10  10  10   BUCR  15  23*  30*      CBC w/Diff Recent Labs      01/22/18   0340  01/21/18   0832  01/20/18   0605   WBC  29.6*  31.1*  27.2*   RBC  4.13*  3.83*  3.95*   HGB  12.2  11.6*  11.7*   HCT  36.4  33.3*  34.6*   PLT  210  219  195   GRANS  36*  42  48   LYMPH  29  26  37   EOS  23*  19*  6*      Cardiac Enzymes No results for input(s): CPK, CKND1, CHRISTINA in the last 72 hours. No lab exists for component: CKRMB, TROIP   Coagulation No results for input(s): PTP, INR, APTT in the last 72 hours. No lab exists for component: INREXT, INREXT    Lipid Panel No results found for: CHOL, CHOLPOCT, CHOLX, CHLST, CHOLV, 517768, HDL, LDL, LDLC, DLDLP, 057603, VLDLC, VLDL, TGLX, TRIGL, TRIGP, TGLPOCT, CHHD, CHHDX   BNP No results for input(s): BNPP in the last 72 hours.    Liver Enzymes No results for input(s): TP, ALB, TBIL, AP, SGOT, GPT in the last 72 hours.    No lab exists for component: DBIL   Thyroid Studies No results found for: T4, T3U, TSH, TSHEXT, TSHEXT     Procedures/imaging: see electronic medical records for all procedures/Xrays and details which were not copied into this note but were reviewed prior to creation of Isa West MD

## 2018-01-23 LAB
ANION GAP SERPL CALC-SCNC: 9 MMOL/L (ref 3–18)
BASOPHILS # BLD: 0.3 K/UL (ref 0–0.1)
BASOPHILS NFR BLD: 1 % (ref 0–3)
BUN SERPL-MCNC: 10 MG/DL (ref 7–18)
BUN/CREAT SERPL: 10 (ref 12–20)
CALCIUM SERPL-MCNC: 9 MG/DL (ref 8.5–10.1)
CHLORIDE SERPL-SCNC: 104 MMOL/L (ref 100–108)
CO2 SERPL-SCNC: 27 MMOL/L (ref 21–32)
CREAT SERPL-MCNC: 0.99 MG/DL (ref 0.6–1.3)
DIFFERENTIAL METHOD BLD: ABNORMAL
EOSINOPHIL # BLD: 7.9 K/UL (ref 0–0.4)
EOSINOPHIL NFR BLD: 31 % (ref 0–5)
ERYTHROCYTE [DISTWIDTH] IN BLOOD BY AUTOMATED COUNT: 15.5 % (ref 11.6–14.5)
GLUCOSE BLD STRIP.AUTO-MCNC: 102 MG/DL (ref 70–110)
GLUCOSE BLD STRIP.AUTO-MCNC: 80 MG/DL (ref 70–110)
GLUCOSE BLD STRIP.AUTO-MCNC: 92 MG/DL (ref 70–110)
GLUCOSE BLD STRIP.AUTO-MCNC: 94 MG/DL (ref 70–110)
GLUCOSE SERPL-MCNC: 102 MG/DL (ref 74–99)
HCT VFR BLD AUTO: 34.2 % (ref 35–45)
HGB BLD-MCNC: 11.5 G/DL (ref 12–16)
LYMPHOCYTES # BLD: 5.9 K/UL (ref 0.8–3.5)
LYMPHOCYTES NFR BLD: 23 % (ref 20–51)
MAGNESIUM SERPL-MCNC: 1.7 MG/DL (ref 1.6–2.6)
MCH RBC QN AUTO: 29.6 PG (ref 24–34)
MCHC RBC AUTO-ENTMCNC: 33.6 G/DL (ref 31–37)
MCV RBC AUTO: 87.9 FL (ref 74–97)
MONOCYTES # BLD: 1.5 K/UL (ref 0–1)
MONOCYTES NFR BLD: 6 % (ref 2–9)
NEUTS BAND NFR BLD MANUAL: 3 % (ref 0–5)
NEUTS SEG # BLD: 9.2 K/UL (ref 1.8–8)
NEUTS SEG NFR BLD: 36 % (ref 42–75)
PHOSPHATE SERPL-MCNC: 2.5 MG/DL (ref 2.5–4.9)
PLATELET # BLD AUTO: 198 K/UL (ref 135–420)
PLATELET COMMENTS,PCOM: ABNORMAL
PMV BLD AUTO: 9.5 FL (ref 9.2–11.8)
POTASSIUM SERPL-SCNC: 3.2 MMOL/L (ref 3.5–5.5)
RBC # BLD AUTO: 3.89 M/UL (ref 4.2–5.3)
RBC MORPH BLD: ABNORMAL
SODIUM SERPL-SCNC: 140 MMOL/L (ref 136–145)
WBC # BLD AUTO: 25.6 K/UL (ref 4.6–13.2)

## 2018-01-23 PROCEDURE — 65660000000 HC RM CCU STEPDOWN

## 2018-01-23 PROCEDURE — 97116 GAIT TRAINING THERAPY: CPT

## 2018-01-23 PROCEDURE — 74011000250 HC RX REV CODE- 250: Performed by: HOSPITALIST

## 2018-01-23 PROCEDURE — 82962 GLUCOSE BLOOD TEST: CPT

## 2018-01-23 PROCEDURE — 83735 ASSAY OF MAGNESIUM: CPT | Performed by: HOSPITALIST

## 2018-01-23 PROCEDURE — 74011250636 HC RX REV CODE- 250/636: Performed by: HOSPITALIST

## 2018-01-23 PROCEDURE — 74011250637 HC RX REV CODE- 250/637: Performed by: INTERNAL MEDICINE

## 2018-01-23 PROCEDURE — 80048 BASIC METABOLIC PNL TOTAL CA: CPT | Performed by: HOSPITALIST

## 2018-01-23 PROCEDURE — 74011250636 HC RX REV CODE- 250/636: Performed by: INTERNAL MEDICINE

## 2018-01-23 PROCEDURE — 84100 ASSAY OF PHOSPHORUS: CPT | Performed by: INTERNAL MEDICINE

## 2018-01-23 PROCEDURE — 85025 COMPLETE CBC W/AUTO DIFF WBC: CPT | Performed by: HOSPITALIST

## 2018-01-23 PROCEDURE — 74011250637 HC RX REV CODE- 250/637: Performed by: HOSPITALIST

## 2018-01-23 RX ORDER — POTASSIUM CHLORIDE 20 MEQ/1
40 TABLET, EXTENDED RELEASE ORAL 2 TIMES DAILY
Status: COMPLETED | OUTPATIENT
Start: 2018-01-23 | End: 2018-01-24

## 2018-01-23 RX ORDER — MAGNESIUM SULFATE HEPTAHYDRATE 40 MG/ML
2 INJECTION, SOLUTION INTRAVENOUS ONCE
Status: COMPLETED | OUTPATIENT
Start: 2018-01-23 | End: 2018-01-23

## 2018-01-23 RX ADMIN — SUCRALFATE 1 G: 1 TABLET ORAL at 12:44

## 2018-01-23 RX ADMIN — LEVOFLOXACIN 750 MG: 5 INJECTION, SOLUTION INTRAVENOUS at 11:55

## 2018-01-23 RX ADMIN — SODIUM BICARBONATE 650 MG TABLET 325 MG: at 09:53

## 2018-01-23 RX ADMIN — VANCOMYCIN HYDROCHLORIDE 1250 MG: 10 INJECTION, POWDER, LYOPHILIZED, FOR SOLUTION INTRAVENOUS at 16:27

## 2018-01-23 RX ADMIN — SUCRALFATE 1 G: 1 TABLET ORAL at 09:52

## 2018-01-23 RX ADMIN — SUCRALFATE 1 G: 1 TABLET ORAL at 22:40

## 2018-01-23 RX ADMIN — LOSARTAN POTASSIUM 100 MG: 50 TABLET ORAL at 09:52

## 2018-01-23 RX ADMIN — POTASSIUM CHLORIDE 40 MEQ: 20 TABLET, EXTENDED RELEASE ORAL at 16:17

## 2018-01-23 RX ADMIN — POTASSIUM CHLORIDE 40 MEQ: 20 TABLET, EXTENDED RELEASE ORAL at 22:40

## 2018-01-23 RX ADMIN — METRONIDAZOLE 500 MG: 500 INJECTION, SOLUTION INTRAVENOUS at 22:40

## 2018-01-23 RX ADMIN — SUCRALFATE 1 G: 1 TABLET ORAL at 19:47

## 2018-01-23 RX ADMIN — MAGNESIUM SULFATE HEPTAHYDRATE 2 G: 40 INJECTION, SOLUTION INTRAVENOUS at 16:27

## 2018-01-23 RX ADMIN — ASPIRIN 325 MG ORAL TABLET 325 MG: 325 PILL ORAL at 09:53

## 2018-01-23 RX ADMIN — ENOXAPARIN SODIUM 40 MG: 40 INJECTION SUBCUTANEOUS at 12:45

## 2018-01-23 RX ADMIN — FAMOTIDINE 20 MG: 20 TABLET ORAL at 09:52

## 2018-01-23 RX ADMIN — NORTRIPTYLINE HYDROCHLORIDE 25 MG: 25 CAPSULE ORAL at 22:42

## 2018-01-23 RX ADMIN — FAMOTIDINE 20 MG: 20 TABLET ORAL at 22:40

## 2018-01-23 RX ADMIN — METRONIDAZOLE 500 MG: 500 INJECTION, SOLUTION INTRAVENOUS at 13:24

## 2018-01-23 RX ADMIN — SODIUM BICARBONATE 650 MG TABLET 325 MG: at 22:41

## 2018-01-23 RX ADMIN — NYSTATIN: 100000 POWDER TOPICAL at 09:54

## 2018-01-23 RX ADMIN — NYSTATIN: 100000 POWDER TOPICAL at 22:42

## 2018-01-23 NOTE — PROGRESS NOTES
0730 Assumed responsibility for patient from Lj Finch,  [address] Patient assessed and morning medications given. 1145 Levaquin hung    1325 Flagyl hung    1641 Vancomycin and mag sulfate hung    Bedside shift change report given to Lj Finch RN (oncoming nurse) by Kathy Perkins RN (offgoing nurse). Report included the following information SBAR, Kardex and MAR.

## 2018-01-23 NOTE — PROGRESS NOTES
Hospitalist Progress Note-critical care note     Patient: Verito Carlin MRN: [id number]  CSN: [id number]    YOB: 1945  Age: 81 y.o.   Sex: female    DOA: 1/15/2018 LOS:  LOS: 8 days            Chief complaint:sepsis , sandra on ckds , cellulitis , proctitis , hypotension, severe sepsis , acute encephalopathy     Assessment/Plan         Hospital Problems  Date Reviewed: 1/15/2018          Codes Class Noted POA    Hypokalemia ICD-10-CM: E87.6  ICD-9-CM: 276.8  1/22/2018 Unknown        Hypomagnesemia ICD-10-CM: E83.42  ICD-9-CM: 275.2  1/22/2018 Unknown        Leukocytosis ICD-10-CM: D72.829  ICD-9-CM: 288.60  1/21/2018 Unknown        Acute encephalopathy ICD-10-CM: G93.40  ICD-9-CM: 348.30  1/18/2018 Unknown        Rash ICD-10-CM: R21  ICD-9-CM: 782.1  1/18/2018 Unknown        Hypotension ICD-10-CM: I95.9  ICD-9-CM: 458.9  1/18/2018 Unknown        Severe sepsis ([address] ICD-10-CM: A41.9, R65.20  ICD-9-CM: 038.9, 995.92  1/18/2018 Unknown        Proctitis ICD-10-CM: K62.89  ICD-9-CM: 569.49  1/17/2018 Unknown        * (Principal)Cellulitis of abdominal wall ICD-10-CM: I57.521  ICD-9-CM: 682.2  1/15/2018 Unknown        Cellulitis of groin ICD-10-CM: L03.314  ICD-9-CM: 682.2  1/15/2018 Unknown        UTI (urinary tract infection) ICD-10-CM: N39.0  ICD-9-CM: 599.0  1/15/2018 Unknown        Renal insufficiency ICD-10-CM: N28.9  ICD-9-CM: 593.9  1/15/2018 Unknown        Sepsis ([address] ICD-10-CM: A41.9  ICD-9-CM: 038.9, 995.91  1/15/2018 Unknown        GERD (gastroesophageal reflux disease) ICD-10-CM: K21.9  ICD-9-CM: 530.81  1/15/2018 Unknown        HLD (hyperlipidemia) ICD-10-CM: E78.5  ICD-9-CM: 272.4  1/15/2018 Unknown        Moderate dehydration ICD-10-CM: E86.0  ICD-9-CM: 276.51  1/15/2018 Unknown        Diastolic CHF, chronic (HCC) ICD-10-CM: I50.32  ICD-9-CM: 428.32, 428.0  1/15/2018 Unknown        SIRS (systemic inflammatory response syndrome) ([facility] ICD-10-CM: R65.10  ICD-9-CM: 995.90  1/15/2018 Unknown Abdominal wall cellulitis ICD-10-CM: B61.096  ICD-9-CM: 682.2  1/15/2018 Unknown        HTN (hypertension) ICD-10-CM: I10  ICD-9-CM: 401.9  11/4/2013 Yes            1. Severe Sepsis with associated Lactic Acidosis in the setting of fever, UTI, abdominal wall cellulitis and cellulitis from groining   Continue improving   Echo : ef wnl no vegetation noted   Repeated ct no fluid collection   Repeated bcx so far negative       2. Leukocytosis   Down to 25 K  will continue monitorin  Cellulitis improving clinically   No abscess on ct         2. Abdominal Wall cellulitis- bacterial and/or fungal and cellulitis of groin   Improving of abdomen wall site  Groin site improving also   Continue abx   Wound care   no abscess per ct scan     3. UTI  Continue abx   cx so far negative   4. Acute Kidney Injury on ckd3  Cr back to base line   5. Moderate Dehydration   6. Diastolic CHF, chronic, compensated   7. HTN  On  home medication   8. GERD  ppi   9. HLD  Statin   11. ?Proctitis, not presented per repeated ct   12 acute encephalopathy   Back to her baseline     13 rash (poa)  resolving,   14 hypokalemia    K replacement , will give mg also to help K absorption     Subjective: feel better   Nurse: reported take spirolactone at home     Daughter and  were at the bedside. Need to verify home medication, discussed with rn.  Review of systems:    General: No fevers or chills. Cardiovascular: No chest pain or pressure. No palpitations. Pulmonary: No shortness of breath. Gastrointestinal: No nausea, vomiting.      Vital signs/Intake and Output:  Visit Vitals    /79 (BP 1 Location: Right arm, BP Patient Position: At rest;Sitting)    Pulse (!) 107    Temp 98.1 °F (36.7 °C)    Resp 17    Ht 4' 11\" (1.499 m)    Wt 78.7 kg (173 lb 6.4 oz)    SpO2 100%    BMI 35.02 kg/m2     Current Shift:  01/23 0701 - 01/23 1900  In: 120 [P.O.:120]  Out: -   Last three shifts:  01/21 1901 - 01/23 0700  In: 1200 [P.O.:1200]  Out: 0     Physical Exam:  General: WD, WN. Alert, cooperative, no acute distress    HEENT: NC, Atraumatic. PERRLA, anicteric sclerae. Lungs: CTA Bilaterally. No Wheezing/Rhonchi/Rales. Heart:  Tachy ,  No murmur, No Rubs, No Gallops  Abdomen: Soft, Non distended, Non tender.  +Bowel sounds, erythema /swelling-lower of abdomen improving, no tenderness  , blisters noted on rt thigh and tenderness   Extremities: No c/c/e  Psych:   Not anxious or agitated. Neurologic:  No acute neurological deficit. Derm: peeling skin noted on face         Labs: Results:       Chemistry Recent Labs      01/23/18   0520 01/22/18   0340  01/21/18   0832   GLU  102*  72*  82   NA  140  144  143   K  3.2*  3.3*  3.7   CL  104  109*  110*   CO2  27  25  23   BUN  10  14  22*   CREA  0.99  0.95  0.96   CA  9.0  8.5  8.3*   AGAP  9  10  10   BUCR  10*  15  23*      CBC w/Diff Recent Labs      01/23/18   0520 01/22/18   0340  01/21/18   0832   WBC  25.6*  29.6*  31.1*   RBC  3.89*  4.13*  3.83*   HGB  11.5*  12.2  11.6*   HCT  34.2*  36.4  33.3*   PLT  198  210  219   GRANS  36*  36*  42   LYMPH  23  29  26   EOS  31*  23*  19*      Cardiac Enzymes No results for input(s): CPK, CKND1, CHRISTINA in the last 72 hours. No lab exists for component: CKRMB, TROIP   Coagulation No results for input(s): PTP, INR, APTT in the last 72 hours. No lab exists for component: INREXT, INREXT    Lipid Panel No results found for: CHOL, CHOLPOCT, CHOLX, CHLST, CHOLV, 377359, HDL, LDL, LDLC, DLDLP, 684901, VLDLC, VLDL, TGLX, TRIGL, TRIGP, TGLPOCT, CHHD, CHHDX   BNP No results for input(s): BNPP in the last 72 hours. Liver Enzymes No results for input(s): TP, ALB, TBIL, AP, SGOT, GPT in the last 72 hours.     No lab exists for component: DBIL   Thyroid Studies No results found for: T4, T3U, TSH, TSHEXT, TSHEXT     Procedures/imaging: see electronic medical records for all procedures/Xrays and details which were not copied into this note but were reviewed prior to creation of Rozina Gonzalez MD

## 2018-01-23 NOTE — PROGRESS NOTES
NUTRITION FOLLOW-UP    RECOMMENDATIONS/PLAN:   - Remove Renal Diet Restriction-Phos-2.2 K-3.2   - Recc daily Phos labs to continue to monitor to see if Renal Diet restriction needs to be added back  Monitor labs/lytes, PO intakes, skin integrity, wt, fluid status, BM  NUTRITION ASSESSMENT:   Client Update: 81 yrs old Female with sepsis , sandra on ckds , cellulitis , proctitis. FOOD/NUTRITION INTAKE   Diet Order:  Renal 888, AHA   Supplements: Ensure Clear TID   Food Allergies: NKFA  Average PO Intake:      Patient Vitals for the past 100 hrs:   % Diet Eaten   01/22/18 1915 30 %   01/22/18 1537 30 %   01/22/18 0912 50 %   01/21/18 1915 10 %   01/21/18 1254 10 %   01/21/18 0955 50 %   01/20/18 2246 25 %   01/20/18 1310 10 %   01/20/18 0904 25 %      Pertinent Medications:  [x] Reviewed; pepcid, mycostatin, sodium bicarbonate   Electrolyte Replacement Protocol: [x]K [x]Mg [x]PO4  Insulin:  []SSI  [x]Pre-meal   []Basal    []Drip  []None  Cultural/Yarsani Food Preferences: None Identified    BIOCHEMICAL DATA & MEDICAL TESTS  Pertinent Labs:  [x] Reviewed; Phos-2.2 K-3.2   ANTHROPOMETRICS  Height: 4' 11\" (149.9 cm)       Weight: 78.7 kg (173 lb 6.4 oz)         BMI: 35 kg/m^2 obese (30%-39.9% BMI)   Adm Weight: 172 lbs                Weight change: +1lbs  Adjusted Body Weight: 53kg     NUTRITION-FOCUSED PHYSICAL ASSESSMENT  Skin: No pressure injury noted      GI: +BM 1/22/18    NUTRITION PRESCRIPTION  Calories: 3329-5196 kcal/day based on 25-30kcal/kg of IBW  Protein:63-78 g/day based on 0.8-1.0 g/kg ABW  CHO: 139-167 g/day based on 50% of total energy  SLWPJ: 6048-2287 MP/CST based on 1 kcal/ml       NUTRITION DIAGNOSES:   1. Inadequate oral intake related to personal preference as evidence by 0% Po intake at most meals  NUTRITION INTERVENTIONS:   INTERVENTIONS:        GOALS:  1. Other:Remove Renal restriction on diet order 1.  Encourage PO intake >50% at most meals by next review 3 days     LEARNING NEEDS (Diet, Supplementation, Food/Nutrient-Drug Interaction):   [x] None Identified   [] Education provided/documented      Identified and patient: [] Declined   [] Was not appropriate/indicated        NUTRITION MONITORING /EVALUATION:   Follow PO intake  Monitor wt  Monitor renal labs, electrolytes, fluid status     Previous Recommendations Implemented: yes        Previous Goals Met:  no -      [] Participated in Interdisciplinary Rounds    [x] Interdisciplinary Care Plan Reviewed  DISCHARGE NUTRITION RECOMMENDATIONS ADDRESSED:      [x] To be determined closer to discharge    NUTRITION RISK:           [x] At risk                        [] Not currently at risk        Will follow-up per policy.   Tamir Mcclure RD  PAGER:  [phone]

## 2018-01-23 NOTE — ROUTINE PROCESS
Bedside shift change report given to [address] Ibanez (oncoming nurse) by Ofelia KASPER RN (offgoing nurse). Report included the following information SBAR, Kardex and MAR.

## 2018-01-23 NOTE — PROGRESS NOTES
Problem: Mobility Impaired (Adult and Pediatric)  Goal: *Acute Goals and Plan of Care (Insert Text)  Physical Therapy Goals  Initiated 1/18/2018 and to be accomplished within 3-7 day(s)  1. Patient will move from supine to sit and sit to supine  in bed with supervision/set-up. 2.  Patient will transfer from bed to chair and chair to bed with supervision/set-up using the least restrictive device. 3.  Patient will perform sit to stand with supervision/set-up. 4.  Patient will ambulate with supervision/set-up for 150 feet with the least restrictive device. 5.  Patient will ascend/descend 1 stairs without handrail(s) with minimal assistance/contact guard assist.   Outcome: Progressing Towards Goal  physical Therapy TREATMENT    Patient: Verito Carlin (24 y.o. female)  Date: 1/23/2018  Diagnosis: Sepsis ([address]  Cellulitis of abdominal wall  Cellulitis of groin  Renal insufficiency  UTI (urinary tract infection)  SIRS (systemic inflammatory response syndrome) (HCC)  UTI (urinary tract infection)  Abdominal wall cellulitis Cellulitis of abdominal wall  Precautions: Fall   Chart, physical therapy assessment, plan of care and goals were reviewed. ASSESSMENT:  Discussed DME needs for home. Pt agreeable to participate. Pt unable to increase ambulation distance at this time. Pt requires x3 standing rest due to fatigue. Cont POC. Progression toward goals:  []      Improving appropriately and progressing toward goals  [x]      Improving slowly and progressing toward goals  []      Not making progress toward goals and plan of care will be adjusted     PLAN:  Patient continues to benefit from skilled intervention to address the above impairments. Continue treatment per established plan of care.   Discharge Recommendations:  Home Health  Further Equipment Recommendations for Discharge:  Transfer bench for shower, and RW      SUBJECTIVE:   Patient stated   My daughter will help me     OBJECTIVE DATA SUMMARY: Critical Behavior:  Neurologic State: Alert  Orientation Level: Oriented X4  Cognition: Follows commands, Appropriate safety awareness  Functional Mobility Training:  Transfers:  Sit to Stand: Contact guard assistance  Stand to Sit: Stand-by asssistance  Balance:  Sitting: Intact  Standing: Impaired; With support  Standing - Static: Fair  Standing - Dynamic : Fair  Ambulation/Gait Training:  Distance (ft): 120 Feet (ft)  Assistive Device: Walker, rolling;Gait belt  Ambulation - Level of Assistance: Contact guard assistance  Gait Abnormalities: Decreased step clearance  Base of Support: Widened  Speed/Xiao: Slow;Shuffled  Step Length: Right shortened;Left shortened  Activity Tolerance:   Fair     After treatment:   [] Patient left in no apparent distress sitting up in chair  [x] Patient left in no apparent distress in bed(EOB)   [x] Call bell left within reach  [] Nursing notified  [x] Caregiver present  [] Bed alarm activated      Maryjane Rivera PTA   Time Calculation: 15 mins

## 2018-01-23 NOTE — PROGRESS NOTES
1915: Assumed patient care, she was alert and oriented to person, place, time and situation, but with periods of confusion noted. Respiratory status was stable on room air. Vital signs were stable. MEWS score was a two. Patient denied any nausea vomiting dizziness or anxiety. White board was updated and explained. Bed was locked and in lowest position. Call bell, water and personal belongings were within reach. Patient had no questions, comments or concerns after bedside shift report. 2030: Patient was ambulating without her walker in the room. She did not call for assistance. Patient was educated on the need to call for assistance to avoid a possible fall. Patient verbalized understanding, but needs reinforcement. Bed alarm was on for safety. 2115: Patient was ambulating without her walker in the room. She did not call for assistance. Patient was educated on the need to call for assistance to avoid a possible fall. Patient verbalized understanding, but needs reinforcement. Bed alarm was on for safety. 2200: Patient was ambulating without her walker in the room. She did not call for assistance. Patient was educated on the need to call for assistance to avoid a possible fall. Patient verbalized understanding, but needs reinforcement. Bed alarm was on for safety. 2315: Patient was ambulating without her walker in the room. She did not call for assistance. Patient was educated on the need to call for assistance to avoid a possible fall. Patient verbalized understanding, but needs reinforcement. Bed alarm was on for safety. 2345: Patient was found sleeping in a chair. When questioned the patient stated that she was uncomfortable in the bed. This nurse got the patient a recliner to sleep in. A chair alarm was placed for safety. 0015: Patient appeared to be sleeping with no signs of distress noted. 0700: Patient had an uneventful shift.  Respiratory status, vital signs and MEWS score remained stable. Patient was resting quietly with no signs of distress noted. Bed locked and in lowest position. Call bell water and personal belongings were within reach. Patient had no questions, comments or concerns after bedside shift report.  Bedside report given to Jg DREW

## 2018-01-23 NOTE — PROGRESS NOTES
Met with patient at bedside. Pt had questions about obtaining a shower chair and rollator. Pt encouraged to obtain script from PCP to determine if her insurance will pay for DME, and informed it typically does not cover these items. Pt verbalized understanding. Discussed plan of care. Pt still refusing rehab. Pt has been offered [facility] for [facility] and she has chosen [facility]. No other questions at this time. CM will cont to follow.

## 2018-01-24 LAB
ANION GAP SERPL CALC-SCNC: 10 MMOL/L (ref 3–18)
BACTERIA SPEC CULT: NORMAL
BACTERIA SPEC CULT: NORMAL
BASOPHILS # BLD: 0.1 K/UL (ref 0–0.06)
BASOPHILS NFR BLD: 0 % (ref 0–2)
BUN SERPL-MCNC: 8 MG/DL (ref 7–18)
BUN/CREAT SERPL: 8 (ref 12–20)
CALCIUM SERPL-MCNC: 8.3 MG/DL (ref 8.5–10.1)
CHLORIDE SERPL-SCNC: 105 MMOL/L (ref 100–108)
CO2 SERPL-SCNC: 27 MMOL/L (ref 21–32)
CREAT SERPL-MCNC: 1 MG/DL (ref 0.6–1.3)
DIFFERENTIAL METHOD BLD: ABNORMAL
EOSINOPHIL # BLD: 5.1 K/UL (ref 0–0.4)
EOSINOPHIL NFR BLD: 24 % (ref 0–5)
ERYTHROCYTE [DISTWIDTH] IN BLOOD BY AUTOMATED COUNT: 15.9 % (ref 11.6–14.5)
GLUCOSE BLD STRIP.AUTO-MCNC: 106 MG/DL (ref 70–110)
GLUCOSE BLD STRIP.AUTO-MCNC: 144 MG/DL (ref 70–110)
GLUCOSE BLD STRIP.AUTO-MCNC: 163 MG/DL (ref 70–110)
GLUCOSE BLD STRIP.AUTO-MCNC: 92 MG/DL (ref 70–110)
GLUCOSE SERPL-MCNC: 95 MG/DL (ref 74–99)
HCT VFR BLD AUTO: 33.4 % (ref 35–45)
HGB BLD-MCNC: 11.2 G/DL (ref 12–16)
LYMPHOCYTES # BLD: 3.3 K/UL (ref 0.9–3.6)
LYMPHOCYTES NFR BLD: 15 % (ref 21–52)
MAGNESIUM SERPL-MCNC: 1.7 MG/DL (ref 1.6–2.6)
MCH RBC QN AUTO: 29.4 PG (ref 24–34)
MCHC RBC AUTO-ENTMCNC: 33.5 G/DL (ref 31–37)
MCV RBC AUTO: 87.7 FL (ref 74–97)
MONOCYTES # BLD: 1.7 K/UL (ref 0.05–1.2)
MONOCYTES NFR BLD: 8 % (ref 3–10)
NEUTS SEG # BLD: 11.6 K/UL (ref 1.8–8)
NEUTS SEG NFR BLD: 53 % (ref 40–73)
PHOSPHATE SERPL-MCNC: 2.6 MG/DL (ref 2.5–4.9)
PLATELET # BLD AUTO: 186 K/UL (ref 135–420)
PMV BLD AUTO: 9.1 FL (ref 9.2–11.8)
POTASSIUM SERPL-SCNC: 3.6 MMOL/L (ref 3.5–5.5)
RBC # BLD AUTO: 3.81 M/UL (ref 4.2–5.3)
SERVICE CMNT-IMP: NORMAL
SERVICE CMNT-IMP: NORMAL
SODIUM SERPL-SCNC: 142 MMOL/L (ref 136–145)
WBC # BLD AUTO: 21.8 K/UL (ref 4.6–13.2)

## 2018-01-24 PROCEDURE — 74011250636 HC RX REV CODE- 250/636: Performed by: HOSPITALIST

## 2018-01-24 PROCEDURE — 74011000250 HC RX REV CODE- 250: Performed by: HOSPITALIST

## 2018-01-24 PROCEDURE — 74011250637 HC RX REV CODE- 250/637: Performed by: HOSPITALIST

## 2018-01-24 PROCEDURE — 83735 ASSAY OF MAGNESIUM: CPT | Performed by: HOSPITALIST

## 2018-01-24 PROCEDURE — 97116 GAIT TRAINING THERAPY: CPT

## 2018-01-24 PROCEDURE — 74011250637 HC RX REV CODE- 250/637: Performed by: INTERNAL MEDICINE

## 2018-01-24 PROCEDURE — 80048 BASIC METABOLIC PNL TOTAL CA: CPT | Performed by: HOSPITALIST

## 2018-01-24 PROCEDURE — 74011250636 HC RX REV CODE- 250/636: Performed by: INTERNAL MEDICINE

## 2018-01-24 PROCEDURE — 84100 ASSAY OF PHOSPHORUS: CPT | Performed by: HOSPITALIST

## 2018-01-24 PROCEDURE — 74011636637 HC RX REV CODE- 636/637: Performed by: INTERNAL MEDICINE

## 2018-01-24 PROCEDURE — 65660000000 HC RM CCU STEPDOWN

## 2018-01-24 PROCEDURE — 97530 THERAPEUTIC ACTIVITIES: CPT

## 2018-01-24 PROCEDURE — 82962 GLUCOSE BLOOD TEST: CPT

## 2018-01-24 PROCEDURE — 85025 COMPLETE CBC W/AUTO DIFF WBC: CPT | Performed by: HOSPITALIST

## 2018-01-24 RX ORDER — HYDROCHLOROTHIAZIDE 25 MG/1
25 TABLET ORAL DAILY
Status: DISCONTINUED | OUTPATIENT
Start: 2018-01-25 | End: 2018-01-26 | Stop reason: HOSPADM

## 2018-01-24 RX ORDER — ALLOPURINOL 300 MG/1
300 TABLET ORAL DAILY
Status: DISCONTINUED | OUTPATIENT
Start: 2018-01-25 | End: 2018-01-26 | Stop reason: HOSPADM

## 2018-01-24 RX ORDER — FUROSEMIDE 20 MG/1
10 TABLET ORAL DAILY
Status: DISCONTINUED | OUTPATIENT
Start: 2018-01-25 | End: 2018-01-24

## 2018-01-24 RX ORDER — ALLOPURINOL 300 MG/1
300 TABLET ORAL DAILY
COMMUNITY
End: 2018-02-08 | Stop reason: ALTCHOICE

## 2018-01-24 RX ORDER — SPIRONOLACTONE 25 MG/1
25 TABLET ORAL DAILY
Status: DISCONTINUED | OUTPATIENT
Start: 2018-01-25 | End: 2018-01-26 | Stop reason: HOSPADM

## 2018-01-24 RX ORDER — HYDROCHLOROTHIAZIDE 25 MG/1
25 TABLET ORAL DAILY
Status: ON HOLD | COMMUNITY
End: 2018-01-26

## 2018-01-24 RX ORDER — SPIRONOLACTONE 25 MG/1
TABLET ORAL DAILY
Status: ON HOLD | COMMUNITY
End: 2018-01-26

## 2018-01-24 RX ADMIN — ASPIRIN 325 MG ORAL TABLET 325 MG: 325 PILL ORAL at 10:25

## 2018-01-24 RX ADMIN — LOSARTAN POTASSIUM 100 MG: 50 TABLET ORAL at 10:25

## 2018-01-24 RX ADMIN — POTASSIUM CHLORIDE 40 MEQ: 20 TABLET, EXTENDED RELEASE ORAL at 21:05

## 2018-01-24 RX ADMIN — POTASSIUM CHLORIDE 40 MEQ: 20 TABLET, EXTENDED RELEASE ORAL at 10:25

## 2018-01-24 RX ADMIN — FAMOTIDINE 20 MG: 20 TABLET ORAL at 21:06

## 2018-01-24 RX ADMIN — METRONIDAZOLE 500 MG: 500 INJECTION, SOLUTION INTRAVENOUS at 06:28

## 2018-01-24 RX ADMIN — NYSTATIN: 100000 POWDER TOPICAL at 21:15

## 2018-01-24 RX ADMIN — LEVOFLOXACIN 750 MG: 5 INJECTION, SOLUTION INTRAVENOUS at 13:22

## 2018-01-24 RX ADMIN — VANCOMYCIN HYDROCHLORIDE 1250 MG: 10 INJECTION, POWDER, LYOPHILIZED, FOR SOLUTION INTRAVENOUS at 18:43

## 2018-01-24 RX ADMIN — METRONIDAZOLE 500 MG: 500 INJECTION, SOLUTION INTRAVENOUS at 13:23

## 2018-01-24 RX ADMIN — SUCRALFATE 1 G: 1 TABLET ORAL at 21:06

## 2018-01-24 RX ADMIN — METRONIDAZOLE 500 MG: 500 INJECTION, SOLUTION INTRAVENOUS at 21:05

## 2018-01-24 RX ADMIN — SUCRALFATE 1 G: 1 TABLET ORAL at 13:23

## 2018-01-24 RX ADMIN — NYSTATIN: 100000 POWDER TOPICAL at 10:27

## 2018-01-24 RX ADMIN — INSULIN LISPRO 2 UNITS: 100 INJECTION, SOLUTION INTRAVENOUS; SUBCUTANEOUS at 14:52

## 2018-01-24 RX ADMIN — SUCRALFATE 1 G: 1 TABLET ORAL at 10:25

## 2018-01-24 RX ADMIN — ENOXAPARIN SODIUM 40 MG: 40 INJECTION SUBCUTANEOUS at 13:23

## 2018-01-24 RX ADMIN — SODIUM BICARBONATE 650 MG TABLET 325 MG: at 10:26

## 2018-01-24 RX ADMIN — FAMOTIDINE 20 MG: 20 TABLET ORAL at 10:25

## 2018-01-24 RX ADMIN — SUCRALFATE 1 G: 1 TABLET ORAL at 18:43

## 2018-01-24 RX ADMIN — NORTRIPTYLINE HYDROCHLORIDE 25 MG: 25 CAPSULE ORAL at 21:06

## 2018-01-24 NOTE — PROGRESS NOTES
Chart reviewed. Met with patient at bedside. Per MD Lamin Boogie, pt still require hospitalization and medically mangt. Pt may discharge within 24-48 hours once medically cleared.

## 2018-01-24 NOTE — PROGRESS NOTES
0720:  Assumed care for patient, received bedside report from Makayla Bello RN. Patient sitting up in chair on the telephone with no complaints of pain or discomfort. Makayla Bello RN to draw morning labs from PICC. Whiteboard updated, bed at the lowest position with call bell within reach. 1205:  Spoke with pharmacist Sara Sena from [address] [place] [address] in [facility] in regard of patient medication. Was informed that a list would be faxed over. 2002:  Bedside and Verbal shift change report given to Krista Johnson RN (oncoming nurse) by Jori Clarke RN   (offgoing nurse).  Report included the following information SBAR, Kardex, Intake/Output, MAR, Recent Results, Med Rec Status and Cardiac Rhythm ST.

## 2018-01-24 NOTE — PROGRESS NOTES
1915: Assumed patient care, she was alert and oriented to person, place, time and situation. Respiratory status was stable on room. Vital signs were stable. MEWS score was a two. Patient denied any nausea vomiting dizziness or anxiety. White board was updated and explained. Bed was locked and in lowest position. Call bell, water and personal belongings were within reach. Patient had no questions, comments or concerns after bedside shift report. 2240: Patient was ambulating without her walker in the room. She did not call for assistance. Patient was educated on the need to call for assistance to avoid a possible fall. Patient verbalized understanding, but needs reinforcement. Bed alarm was on for safety. 0700: Patient had an uneventful shift. Respiratory status, vital signs and MEWS score remained stable. Patient was resting quietly with no signs of distress noted. Bed locked and in lowest position. Call bell water and personal belongings were within reach. Patient  had no questions, comments or concerns after bedside shift report.  Bedside report given to [facility] RADHA DREW

## 2018-01-24 NOTE — PROGRESS NOTES
Problem: Falls - Risk of  Goal: *Absence of Falls  Document Santiago Fall Risk and appropriate interventions in the flowsheet.    Outcome: Progressing Towards Goal  Fall Risk Interventions:  Mobility Interventions: Patient to call before getting OOB    Mentation Interventions: Adequate sleep, hydration, pain control, Door open when patient unattended    Medication Interventions: Patient to call before getting OOB    Elimination Interventions: Bed/chair exit alarm, Call light in reach, Patient to call for help with toileting needs, Toileting schedule/hourly rounds

## 2018-01-24 NOTE — PROGRESS NOTES
Hospitalist Progress Note-critical care note     Patient: Ventura Mccauley MRN: [id number]  CSN: [id number]    YOB: 1945  Age: 81 y.o.   Sex: female    DOA: 1/15/2018 LOS:  LOS: 9 days            Chief complaint:sepsis , sandra on ckds , cellulitis , proctitis , hypotension, severe sepsis , acute encephalopathy     Assessment/Plan         Hospital Problems  Date Reviewed: 1/15/2018          Codes Class Noted POA    Hypokalemia ICD-10-CM: E87.6  ICD-9-CM: 276.8  1/22/2018 Unknown        Hypomagnesemia ICD-10-CM: E83.42  ICD-9-CM: 275.2  1/22/2018 Unknown        Leukocytosis ICD-10-CM: D72.829  ICD-9-CM: 288.60  1/21/2018 Unknown        Acute encephalopathy ICD-10-CM: G93.40  ICD-9-CM: 348.30  1/18/2018 Unknown        Rash ICD-10-CM: R21  ICD-9-CM: 782.1  1/18/2018 Unknown        Hypotension ICD-10-CM: I95.9  ICD-9-CM: 458.9  1/18/2018 Unknown        Severe sepsis ([address] ICD-10-CM: A41.9, R65.20  ICD-9-CM: 038.9, 995.92  1/18/2018 Unknown        Proctitis ICD-10-CM: K62.89  ICD-9-CM: 569.49  1/17/2018 Unknown        * (Principal)Cellulitis of abdominal wall ICD-10-CM: A62.032  ICD-9-CM: 682.2  1/15/2018 Unknown        Cellulitis of groin ICD-10-CM: L03.314  ICD-9-CM: 682.2  1/15/2018 Unknown        UTI (urinary tract infection) ICD-10-CM: N39.0  ICD-9-CM: 599.0  1/15/2018 Unknown        Renal insufficiency ICD-10-CM: N28.9  ICD-9-CM: 593.9  1/15/2018 Unknown        Sepsis ([address] ICD-10-CM: A41.9  ICD-9-CM: 038.9, 995.91  1/15/2018 Unknown        GERD (gastroesophageal reflux disease) ICD-10-CM: K21.9  ICD-9-CM: 530.81  1/15/2018 Unknown        HLD (hyperlipidemia) ICD-10-CM: E78.5  ICD-9-CM: 272.4  1/15/2018 Unknown        Moderate dehydration ICD-10-CM: E86.0  ICD-9-CM: 276.51  1/15/2018 Unknown        Diastolic CHF, chronic (HCC) ICD-10-CM: I50.32  ICD-9-CM: 428.32, 428.0  1/15/2018 Unknown        SIRS (systemic inflammatory response syndrome) ([address] ICD-10-CM: R65.10  ICD-9-CM: 995.90  1/15/2018 Unknown Abdominal wall cellulitis ICD-10-CM: C23.226  ICD-9-CM: 682.2  1/15/2018 Unknown        HTN (hypertension) ICD-10-CM: I10  ICD-9-CM: 401.9  11/4/2013 Yes            1. Severe Sepsis with associated Lactic Acidosis in the setting of fever, UTI, abdominal wall cellulitis and cellulitis from groining   Continue improving. Wbc down to 21 from 31   Echo : ef wnl no vegetation noted   Repeated ct no fluid collection   Repeated bcx so far negative       2. Leukocytosis   continue monitoring-improving   Cellulitis improving clinically   No abscess on ct         2. Abdominal Wall cellulitis- bacterial and/or fungal and cellulitis of groin   Resolving   Continue abx   Wound care   no abscess per ct scan     3. UTI  Continue abx   cx so far negative   4. Acute Kidney Injury on ckd3  Cr wnl now   5. Moderate Dehydration   Resolved   6. Diastolic CHF, chronic, compensated   7. HTN  Verified home medication  , restarted   8. GERD  ppi   9. HLD  Statin   11. ?Proctitis, not presented per repeated ct   12 acute encephalopathy   Back to her baseline     13 rash (poa)  resolving,   14 hypokalemia    K replacement , will give mg also to help K absorption     Subjective: leg swelling   Nurse: got list from pharmacy     Daughter and  were at the bedside. Review of systems:    General: No fevers or chills. Cardiovascular: No chest pain or pressure. No palpitations. Pulmonary: No shortness of breath. Gastrointestinal: No nausea, vomiting. Msk: leg swelling     Vital signs/Intake and Output:  Visit Vitals    /67 (BP 1 Location: Left leg, BP Patient Position: At rest;Supine)    Pulse 99    Temp 97 °F (36.1 °C)    Resp 18    Ht 4' 11\" (1.499 m)    Wt 78.7 kg (173 lb 6.4 oz)    SpO2 100%    BMI 35.02 kg/m2     Current Shift:  01/24 0701 - 01/24 1900  In: 220 [P.O.:220]  Out: -   Last three shifts:  01/22 1901 - 01/24 0700  In: 840 [P.O.:840]  Out: 200 [Urine:200]    Physical Exam:  General: WD, WN. Alert, cooperative, no acute distress    HEENT: NC, Atraumatic. PERRLA, anicteric sclerae. Lungs: CTA Bilaterally. No Wheezing/Rhonchi/Rales. Heart:  Tachy ,  No murmur, No Rubs, No Gallops  Abdomen: Soft, Non distended, Non tender.  +Bowel sounds, erythema /swelling-lower of abdomen improving, no tenderness  , blisters noted on rt thigh and tenderness   Extremities: No c/c/mild edema   Psych:   Not anxious or agitated. Neurologic:  No acute neurological deficit. Derm: peeling skin noted on face         Labs: Results:       Chemistry Recent Labs      01/24/18   0810  01/23/18   0520  01/22/18   0340   GLU  95  102*  72*   NA  142  140  144   K  3.6  3.2*  3.3*   CL  105  104  109*   CO2  27  27  25   BUN  8  10  14   CREA  1.00  0.99  0.95   CA  8.3*  9.0  8.5   AGAP  10  9  10   BUCR  8*  10*  15      CBC w/Diff Recent Labs      01/24/18   0810  01/23/18   0520  01/22/18   0340   WBC  21.8*  25.6*  29.6*   RBC  3.81*  3.89*  4.13*   HGB  11.2*  11.5*  12.2   HCT  33.4*  34.2*  36.4   PLT  186  198  210   GRANS  53  36*  36*   LYMPH  15*  23  29   EOS  24*  31*  23*      Cardiac Enzymes No results for input(s): CPK, CKND1, CHRISTINA in the last 72 hours. No lab exists for component: CKRMB, TROIP   Coagulation No results for input(s): PTP, INR, APTT in the last 72 hours. No lab exists for component: INREXT, INREXT    Lipid Panel No results found for: CHOL, CHOLPOCT, CHOLX, CHLST, CHOLV, 141021, HDL, LDL, LDLC, DLDLP, 661534, VLDLC, VLDL, TGLX, TRIGL, TRIGP, TGLPOCT, CHHD, CHHDX   BNP No results for input(s): BNPP in the last 72 hours. Liver Enzymes No results for input(s): TP, ALB, TBIL, AP, SGOT, GPT in the last 72 hours.     No lab exists for component: DBIL   Thyroid Studies No results found for: T4, T3U, TSH, TSHEXT, TSHEXT     Procedures/imaging: see electronic medical records for all procedures/Xrays and details which were not copied into this note but were reviewed prior to creation of [address] MD JIE

## 2018-01-25 PROBLEM — E88.09 HYPOALBUMINEMIA: Status: ACTIVE | Noted: 2018-01-25

## 2018-01-25 LAB
ANION GAP SERPL CALC-SCNC: 8 MMOL/L (ref 3–18)
BASOPHILS # BLD: 0 K/UL (ref 0–0.1)
BASOPHILS NFR BLD: 0 % (ref 0–3)
BUN SERPL-MCNC: 6 MG/DL (ref 7–18)
BUN/CREAT SERPL: 6 (ref 12–20)
CALCIUM SERPL-MCNC: 8.3 MG/DL (ref 8.5–10.1)
CHLORIDE SERPL-SCNC: 106 MMOL/L (ref 100–108)
CO2 SERPL-SCNC: 27 MMOL/L (ref 21–32)
CREAT SERPL-MCNC: 0.97 MG/DL (ref 0.6–1.3)
DATE LAST DOSE: ABNORMAL
DIFFERENTIAL METHOD BLD: ABNORMAL
EOSINOPHIL # BLD: 6.6 K/UL (ref 0–0.4)
EOSINOPHIL NFR BLD: 37 % (ref 0–5)
ERYTHROCYTE [DISTWIDTH] IN BLOOD BY AUTOMATED COUNT: 15.8 % (ref 11.6–14.5)
GLUCOSE BLD STRIP.AUTO-MCNC: 105 MG/DL (ref 70–110)
GLUCOSE BLD STRIP.AUTO-MCNC: 171 MG/DL (ref 70–110)
GLUCOSE BLD STRIP.AUTO-MCNC: 90 MG/DL (ref 70–110)
GLUCOSE BLD STRIP.AUTO-MCNC: 98 MG/DL (ref 70–110)
GLUCOSE SERPL-MCNC: 89 MG/DL (ref 74–99)
HCT VFR BLD AUTO: 31.3 % (ref 35–45)
HGB BLD-MCNC: 10.3 G/DL (ref 12–16)
LYMPHOCYTES # BLD: 2.1 K/UL (ref 0.8–3.5)
LYMPHOCYTES NFR BLD: 12 % (ref 20–51)
MAGNESIUM SERPL-MCNC: 1.3 MG/DL (ref 1.6–2.6)
MCH RBC QN AUTO: 28.9 PG (ref 24–34)
MCHC RBC AUTO-ENTMCNC: 32.9 G/DL (ref 31–37)
MCV RBC AUTO: 87.7 FL (ref 74–97)
MONOCYTES # BLD: 0.7 K/UL (ref 0–1)
MONOCYTES NFR BLD: 4 % (ref 2–9)
NEUTS BAND NFR BLD MANUAL: 3 % (ref 0–5)
NEUTS SEG # BLD: 7.9 K/UL (ref 1.8–8)
NEUTS SEG NFR BLD: 44 % (ref 42–75)
PHOSPHATE SERPL-MCNC: 2.1 MG/DL (ref 2.5–4.9)
PLATELET # BLD AUTO: 210 K/UL (ref 135–420)
PMV BLD AUTO: 9 FL (ref 9.2–11.8)
POTASSIUM SERPL-SCNC: 3.8 MMOL/L (ref 3.5–5.5)
RBC # BLD AUTO: 3.57 M/UL (ref 4.2–5.3)
RBC MORPH BLD: ABNORMAL
REPORTED DOSE,DOSE: ABNORMAL UNITS
REPORTED DOSE/TIME,TMG: 1843
SODIUM SERPL-SCNC: 141 MMOL/L (ref 136–145)
VANCOMYCIN TROUGH SERPL-MCNC: 8.9 UG/ML (ref 10–20)
WBC # BLD AUTO: 17.9 K/UL (ref 4.6–13.2)

## 2018-01-25 PROCEDURE — 80202 ASSAY OF VANCOMYCIN: CPT | Performed by: INTERNAL MEDICINE

## 2018-01-25 PROCEDURE — 97164 PT RE-EVAL EST PLAN CARE: CPT

## 2018-01-25 PROCEDURE — 97116 GAIT TRAINING THERAPY: CPT

## 2018-01-25 PROCEDURE — 84100 ASSAY OF PHOSPHORUS: CPT | Performed by: HOSPITALIST

## 2018-01-25 PROCEDURE — 97530 THERAPEUTIC ACTIVITIES: CPT

## 2018-01-25 PROCEDURE — 74011250636 HC RX REV CODE- 250/636: Performed by: INTERNAL MEDICINE

## 2018-01-25 PROCEDURE — P9047 ALBUMIN (HUMAN), 25%, 50ML: HCPCS | Performed by: HOSPITALIST

## 2018-01-25 PROCEDURE — 74011250637 HC RX REV CODE- 250/637: Performed by: INTERNAL MEDICINE

## 2018-01-25 PROCEDURE — 82962 GLUCOSE BLOOD TEST: CPT

## 2018-01-25 PROCEDURE — 65660000000 HC RM CCU STEPDOWN

## 2018-01-25 PROCEDURE — 74011250636 HC RX REV CODE- 250/636: Performed by: HOSPITALIST

## 2018-01-25 PROCEDURE — 83735 ASSAY OF MAGNESIUM: CPT | Performed by: HOSPITALIST

## 2018-01-25 PROCEDURE — 85025 COMPLETE CBC W/AUTO DIFF WBC: CPT | Performed by: HOSPITALIST

## 2018-01-25 PROCEDURE — 74011000250 HC RX REV CODE- 250: Performed by: HOSPITALIST

## 2018-01-25 PROCEDURE — 74011250637 HC RX REV CODE- 250/637: Performed by: HOSPITALIST

## 2018-01-25 PROCEDURE — 80048 BASIC METABOLIC PNL TOTAL CA: CPT | Performed by: HOSPITALIST

## 2018-01-25 PROCEDURE — 97168 OT RE-EVAL EST PLAN CARE: CPT

## 2018-01-25 RX ORDER — ALBUMIN HUMAN 250 G/1000ML
25 SOLUTION INTRAVENOUS ONCE
Status: COMPLETED | OUTPATIENT
Start: 2018-01-25 | End: 2018-01-25

## 2018-01-25 RX ORDER — MAGNESIUM SULFATE HEPTAHYDRATE 40 MG/ML
2 INJECTION, SOLUTION INTRAVENOUS
Status: DISPENSED | OUTPATIENT
Start: 2018-01-25 | End: 2018-01-25

## 2018-01-25 RX ORDER — MAGNESIUM SULFATE HEPTAHYDRATE 40 MG/ML
2 INJECTION, SOLUTION INTRAVENOUS ONCE
Status: COMPLETED | OUTPATIENT
Start: 2018-01-25 | End: 2018-01-25

## 2018-01-25 RX ADMIN — ASPIRIN 325 MG ORAL TABLET 325 MG: 325 PILL ORAL at 09:07

## 2018-01-25 RX ADMIN — MAGNESIUM SULFATE HEPTAHYDRATE 2 G: 40 INJECTION, SOLUTION INTRAVENOUS at 18:52

## 2018-01-25 RX ADMIN — SUCRALFATE 1 G: 1 TABLET ORAL at 13:55

## 2018-01-25 RX ADMIN — MAGNESIUM SULFATE HEPTAHYDRATE 2 G: 40 INJECTION, SOLUTION INTRAVENOUS at 17:01

## 2018-01-25 RX ADMIN — METRONIDAZOLE 500 MG: 500 INJECTION, SOLUTION INTRAVENOUS at 13:55

## 2018-01-25 RX ADMIN — ENOXAPARIN SODIUM 40 MG: 40 INJECTION SUBCUTANEOUS at 13:54

## 2018-01-25 RX ADMIN — SUCRALFATE 1 G: 1 TABLET ORAL at 21:00

## 2018-01-25 RX ADMIN — HYDROCHLOROTHIAZIDE 25 MG: 25 TABLET ORAL at 09:07

## 2018-01-25 RX ADMIN — SPIRONOLACTONE 25 MG: 25 TABLET, FILM COATED ORAL at 09:07

## 2018-01-25 RX ADMIN — LEVOFLOXACIN 750 MG: 5 INJECTION, SOLUTION INTRAVENOUS at 13:55

## 2018-01-25 RX ADMIN — SUCRALFATE 1 G: 1 TABLET ORAL at 09:07

## 2018-01-25 RX ADMIN — NYSTATIN: 100000 POWDER TOPICAL at 09:11

## 2018-01-25 RX ADMIN — ALBUMIN (HUMAN) 25 G: 0.25 INJECTION, SOLUTION INTRAVENOUS at 17:02

## 2018-01-25 RX ADMIN — FAMOTIDINE 20 MG: 20 TABLET ORAL at 09:07

## 2018-01-25 RX ADMIN — FAMOTIDINE 20 MG: 20 TABLET ORAL at 20:48

## 2018-01-25 RX ADMIN — NORTRIPTYLINE HYDROCHLORIDE 25 MG: 25 CAPSULE ORAL at 21:00

## 2018-01-25 RX ADMIN — NYSTATIN: 100000 POWDER TOPICAL at 20:49

## 2018-01-25 RX ADMIN — SODIUM CHLORIDE 1000 MG: 900 INJECTION, SOLUTION INTRAVENOUS at 18:53

## 2018-01-25 RX ADMIN — ALLOPURINOL 300 MG: 300 TABLET ORAL at 09:07

## 2018-01-25 RX ADMIN — SUCRALFATE 1 G: 1 TABLET ORAL at 17:04

## 2018-01-25 RX ADMIN — METRONIDAZOLE 500 MG: 500 INJECTION, SOLUTION INTRAVENOUS at 20:48

## 2018-01-25 RX ADMIN — METRONIDAZOLE 500 MG: 500 INJECTION, SOLUTION INTRAVENOUS at 04:02

## 2018-01-25 NOTE — PROGRESS NOTES
Problem: Mobility Impaired (Adult and Pediatric)  Goal: *Acute Goals and Plan of Care (Insert Text)  Physical Therapy Goals  Initiated 1/18/2018 and to be accomplished within 3-7 day(s)  1. Patient will move from supine to sit and sit to supine  in bed with supervision/set-up. 2.  Patient will transfer from bed to chair and chair to bed with supervision/set-up using the least restrictive device. 3.  Patient will perform sit to stand with supervision/set-up. 4.  Patient will ambulate with supervision/set-up for 150 feet with the least restrictive device. 5.  Patient will ascend/descend 1 stairs without handrail(s) with minimal assistance/contact guard assist.   Outcome: Progressing Towards Goal  physical Therapy RE-EVALUATION and TREATMENT    Patient: Erendira Cedeño (46 y.o. female)  Date: 1/25/2018  Diagnosis: Sepsis ([address]  Cellulitis of abdominal wall  Cellulitis of groin  Renal insufficiency  UTI (urinary tract infection)  SIRS (systemic inflammatory response syndrome) (HCC)  UTI (urinary tract infection)  Abdominal wall cellulitis Cellulitis of abdominal wall  Precautions: Fall    ASSESSMENT:  Pt progressing well with PT. Pt ambulating increased distances however continues to require standing rest breaks. Pt HR, with ambulation today, increased to 140s. Pt would benefit from home health at discharge for continued strength and endurance training. Pt will need RW upon d/c home. Patient's progression toward goals since last assessment: Good     PLAN:  Goals have been updated based on progression since last assessment. Patient continues to benefit from skilled intervention to address the above impairments. Continue to follow the patient 1-2 times per day/4-7 days per week to address goals.   Planned Interventions:  [x]     Bed Mobility Training          [x]     Neuromuscular Re-Education  [x]     Transfer Training                []    Orthotic/Prosthetic Training  [x]     Gait Training []     Modalities  [x]     Therapeutic Exercises       []     Edema Management/Control  [x]     Therapeutic Activities         [x]     Patient and Family Training/Education  []     Other (comment):  Discharge Recommendations: Home Health  Further Equipment Recommendations for Discharge: rolling walker     SUBJECTIVE:   Patient stated I am tired.     OBJECTIVE DATA SUMMARY:   Critical Behavior:  Neurologic State: Alert, Eyes open spontaneously  Orientation Level: Oriented X4  Cognition: Appropriate decision making, Appropriate for age attention/concentration, Appropriate safety awareness, Follows commands  Functional Mobility Training:  Bed Mobility:  Sit to Supine: Minimum assistance; Other (comment) (assist to bring edematous LE's onto bed)   Transfers:  Sit to Stand: Supervision  Stand to Sit: Supervision  Bed to Chair: Supervision  Balance:  Sitting: Intact  Standing: Intact; With support  Standing - Static: Good  Standing - Dynamic : Fair  Ambulation/Gait Training:  Distance (ft): 150 Feet (ft)  Assistive Device: Gait belt;Walker, rolling  Ambulation - Level of Assistance: Contact guard assistance  Gait Abnormalities: Decreased step clearance  Base of Support: Widened  Speed/Xiao: Slow;Shuffled  Step Length: Right shortened;Left shortened  Pain:  Pain Scale 1: Numeric (0 - 10)  Pain Intensity 1: 0  Activity Tolerance:   Good  Please refer to the flowsheet for vital signs taken during this treatment.   After treatment:   []  Patient left in no apparent distress sitting up in chair  [x]  Patient left in no apparent distress in bed  [x]  Call bell left within reach  [x]  Nursing notified  []  Caregiver present  []  Bed alarm activated    Noelle Hyatt   Time Calculation: 23 mins

## 2018-01-25 NOTE — PROGRESS NOTES
Hospitalist Progress Note-critical care note     Patient: Quinn Mcfarland MRN: [id number]  CSN: [id number]    YOB: 1945  Age: 81 y.o. Sex: female    DOA: 1/15/2018 LOS:  LOS: 10 days            Chief complaint:sepsis , sandra on ckds , cellulitis , proctitis , severe sepsis , acute encephalopathy .  Hypoalbuminemia       Assessment/Plan         Hospital Problems  Date Reviewed: 1/15/2018          Codes Class Noted POA    Hypokalemia ICD-10-CM: E87.6  ICD-9-CM: 276.8  1/22/2018 Unknown        Hypomagnesemia ICD-10-CM: E83.42  ICD-9-CM: 275.2  1/22/2018 Unknown        Leukocytosis ICD-10-CM: D72.829  ICD-9-CM: 288.60  1/21/2018 Unknown        Acute encephalopathy ICD-10-CM: G93.40  ICD-9-CM: 348.30  1/18/2018 Unknown        Rash ICD-10-CM: R21  ICD-9-CM: 782.1  1/18/2018 Unknown        Hypotension ICD-10-CM: I95.9  ICD-9-CM: 458.9  1/18/2018 Unknown        Severe sepsis ([address] ICD-10-CM: A41.9, R65.20  ICD-9-CM: 038.9, 995.92  1/18/2018 Unknown        Proctitis ICD-10-CM: K62.89  ICD-9-CM: 569.49  1/17/2018 Unknown        * (Principal)Cellulitis of abdominal wall ICD-10-CM: W38.512  ICD-9-CM: 682.2  1/15/2018 Unknown        Cellulitis of groin ICD-10-CM: L03.314  ICD-9-CM: 682.2  1/15/2018 Unknown        UTI (urinary tract infection) ICD-10-CM: N39.0  ICD-9-CM: 599.0  1/15/2018 Unknown        Renal insufficiency ICD-10-CM: N28.9  ICD-9-CM: 593.9  1/15/2018 Unknown        Sepsis ([address] ICD-10-CM: A41.9  ICD-9-CM: 038.9, 995.91  1/15/2018 Unknown        GERD (gastroesophageal reflux disease) ICD-10-CM: K21.9  ICD-9-CM: 530.81  1/15/2018 Unknown        HLD (hyperlipidemia) ICD-10-CM: E78.5  ICD-9-CM: 272.4  1/15/2018 Unknown        Moderate dehydration ICD-10-CM: E86.0  ICD-9-CM: 276.51  1/15/2018 Unknown        Diastolic CHF, chronic (HCC) ICD-10-CM: I50.32  ICD-9-CM: 428.32, 428.0  1/15/2018 Unknown        SIRS (systemic inflammatory response syndrome) ([facility] ICD-10-CM: R65.10  ICD-9-CM: 995.90  1/15/2018 Unknown        Abdominal wall cellulitis ICD-10-CM: N59.161  ICD-9-CM: 682.2  1/15/2018 Unknown        HTN (hypertension) ICD-10-CM: I10  ICD-9-CM: 401.9  11/4/2013 Yes            1. Severe Sepsis with associated Lactic Acidosis in the setting of fever, UTI, abdominal wall cellulitis and cellulitis from groining   Continue improving. Wbc down to 17   Echo : ef wnl no vegetation noted   Repeated ct no fluid collection   Repeated bcx so far negative       2. Leukocytosis   continue monitoring-improving   Cellulitis improving clinically   No abscess on ct         2. Abdominal Wall cellulitis- bacterial and/or fungal and cellulitis of groin   Resolving   Continue abx   Wound care   no abscess per ct scan     3. UTI  Continue abx   cx so far negative   4. Acute Kidney Injury on ckd3  Cr wnl now   5. Moderate Dehydration   Resolved   6. Diastolic CHF, chronic, compensated   7. HTN  Verified home medication  , restarted   8. GERD  ppi   9. HLD  Statin   11. ?Proctitis, not presented per repeated ct   12 acute encephalopathy   Back to her baseline     13 rash (poa)  resolving,   14 hypomagnesemia    mg replacement   15 bilateral leg swelling-hypoalbuminemia   Will give one dose albumin     Subjective: leg swelling-a little bit better   Nurse: no acute issue     Daughter and  were at the bedside. Disposition: d/c home tomorrow if wbc continue down     Review of systems:    General: No fevers or chills. Cardiovascular: No chest pain or pressure. No palpitations. Pulmonary: No shortness of breath. Gastrointestinal: No nausea, vomiting.    Msk: leg swelling     Vital signs/Intake and Output:  Visit Vitals    /82 (BP 1 Location: Right arm, BP Patient Position: At rest)    Pulse (!) 103    Temp 98.4 °F (36.9 °C)    Resp 18    Ht 4' 11\" (1.499 m)    Wt 74.8 kg (164 lb 14.5 oz)    SpO2 100%    BMI 33.31 kg/m2     Current Shift:     Last three shifts:  01/23 1901 - 01/25 0700  In: 388 [P.O.:700; I.V.:220]  Out: 200 [Urine:200]    Physical Exam:  General: WD, WN. Alert, cooperative, no acute distress    HEENT: NC, Atraumatic. PERRLA, anicteric sclerae. Lungs: CTA Bilaterally. No Wheezing/Rhonchi/Rales. Heart:  Tachy ,  No murmur, No Rubs, No Gallops  Abdomen: Soft, Non distended, Non tender.  +Bowel sounds, erythema /swelling-lower of abdomen improving, no tenderness  , blisters noted on rt thigh and tenderness   Extremities: No c/c/mild edema   Psych:   Not anxious or agitated. Neurologic:  No acute neurological deficit. Derm: peeling skin noted on face         Labs: Results:       Chemistry Recent Labs      01/25/18 0410 01/24/18   0810  01/23/18   0520   GLU  89  95  102*   NA  141  142  140   K  3.8  3.6  3.2*   CL  106  105  104   CO2  27  27  27   BUN  6*  8  10   CREA  0.97  1.00  0.99   CA  8.3*  8.3*  9.0   AGAP  8  10  9   BUCR  6*  8*  10*      CBC w/Diff Recent Labs      01/25/18   0410  01/24/18   0810  01/23/18   0520   WBC  17.9*  21.8*  25.6*   RBC  3.57*  3.81*  3.89*   HGB  10.3*  11.2*  11.5*   HCT  31.3*  33.4*  34.2*   PLT  210  186  198   GRANS  44  53  36*   LYMPH  12*  15*  23   EOS  37*  24*  31*      Cardiac Enzymes No results for input(s): CPK, CKND1, CHRISTINA in the last 72 hours. No lab exists for component: CKRMB, TROIP   Coagulation No results for input(s): PTP, INR, APTT in the last 72 hours. No lab exists for component: INREXT, INREXT    Lipid Panel No results found for: CHOL, CHOLPOCT, CHOLX, CHLST, CHOLV, 006946, HDL, LDL, LDLC, DLDLP, 578579, VLDLC, VLDL, TGLX, TRIGL, TRIGP, TGLPOCT, CHHD, CHHDX   BNP No results for input(s): BNPP in the last 72 hours. Liver Enzymes No results for input(s): TP, ALB, TBIL, AP, SGOT, GPT in the last 72 hours.     No lab exists for component: DBIL   Thyroid Studies No results found for: T4, T3U, TSH, TSHEXT, TSHEXT     Procedures/imaging: see electronic medical records for all procedures/Xrays and details which were not copied into this note but were reviewed prior to creation of William Bruce MD

## 2018-01-25 NOTE — PROGRESS NOTES
0630-Pt had uneventful shift. Rested well, vitals stable, no complaints of pain or SOB, no distress noted.

## 2018-01-25 NOTE — PROGRESS NOTES
0730 Assumed care of pt from [facility]. Pt resting quietly in bed, eyes closed , no signs of distress, call bell within reach. Shift Summary- Shift uneventful. Pt rested throughout shift. Denied chest pain or shortness of breath. Lamount Band to go home.

## 2018-01-25 NOTE — PROGRESS NOTES
Problem: Mobility Impaired (Adult and Pediatric)  Goal: *Acute Goals and Plan of Care (Insert Text)  Physical Therapy Goals  Initiated 1/18/2018 and to be accomplished within 3-7 day(s)  1. Patient will move from supine to sit and sit to supine  in bed with supervision/set-up. 2.  Patient will transfer from bed to chair and chair to bed with supervision/set-up using the least restrictive device. 3.  Patient will perform sit to stand with supervision/set-up. 4.  Patient will ambulate with supervision/set-up for 150 feet with the least restrictive device. 5.  Patient will ascend/descend 1 stairs without handrail(s) with minimal assistance/contact guard assist.   Outcome: Progressing Towards Goal  physical Therapy TREATMENT    Patient: Teresa Arboleda (39 y.o. female)  Date: 1/24/2018  Diagnosis: Sepsis ([address]  Cellulitis of abdominal wall  Cellulitis of groin  Renal insufficiency  UTI (urinary tract infection)  SIRS (systemic inflammatory response syndrome) (HCC)  UTI (urinary tract infection)  Abdominal wall cellulitis Cellulitis of abdominal wall       Precautions: Fall   Chart, physical therapy assessment, plan of care and goals were reviewed. ASSESSMENT:  Pt is mobilizing very well, but requires frequent breaks to recover from SOB. SPO2 range was 95-99% on room air. Progression toward goals:  []      Improving appropriately and progressing toward goals  [x]      Improving slowly and progressing toward goals  []      Not making progress toward goals and plan of care will be adjusted     PLAN:  Patient continues to benefit from skilled intervention to address the above impairments. Continue treatment per established plan of care. Discharge Recommendations:  Home Health  Further Equipment Recommendations for Discharge:  bedside commode and rolling walker     SUBJECTIVE:   Patient stated I'm ok, but they say I'm here until Friday.     OBJECTIVE DATA SUMMARY:   Critical Behavior:  Neurologic State: Alert  Orientation Level: Oriented X4  Cognition: Appropriate decision making, Appropriate for age attention/concentration, Appropriate safety awareness, Follows commands, Recognition of people/places  Functional Mobility Training:  Bed Mobility:  Rolling: Supervision  Supine to Sit: Supervision  Sit to Supine: Supervision  Transfers:  Sit to Stand: Contact guard assistance  Stand to Sit: Contact guard assistance  Balance:  Sitting: Intact  Standing: Impaired  Standing - Static: Fair  Standing - Dynamic : Fair  Ambulation/Gait Training:  Distance (ft): 250 Feet (ft)  Assistive Device: Walker, rolling;Gait belt  Ambulation - Level of Assistance: Contact guard assistance  Gait Abnormalities: Decreased step clearance  Base of Support: Widened  Speed/Xiao: Slow;Shuffled  Step Length: Right shortened;Left shortened  Pain:  Pain Scale 1: Numeric (0 - 10)  Pain Intensity 1: 0  Activity Tolerance:   Good  Please refer to the flowsheet for vital signs taken during this treatment.   After treatment:   [x] Patient left in no apparent distress sitting EOB  [] Patient left in no apparent distress in bed  [x] Call bell left within reach  [x] Nursing notified  [x] Caregiver present  [] Bed alarm activated      Clinton Correia PTA   Time Calculation: 30 mins

## 2018-01-25 NOTE — PROGRESS NOTES
Problem: Falls - Risk of  Goal: *Absence of Falls  Document Santiago Fall Risk and appropriate interventions in the flowsheet.    Outcome: Progressing Towards Goal  Fall Risk Interventions:  Mobility Interventions: Patient to call before getting OOB    Mentation Interventions: Adequate sleep, hydration, pain control, More frequent rounding    Medication Interventions: Patient to call before getting OOB, Teach patient to arise slowly    Elimination Interventions: Call light in reach    History of Falls Interventions: Door open when patient unattended

## 2018-01-25 NOTE — PROGRESS NOTES
Pharmacy Dosing Services: Vancomycin    Vancomycin Trough = 8.9 mcg/mL  Indication = Sepsis  Goal Trough = 15-20 mcg/mL  Increase to Vancomycin 1000mg IV q12h    Pharmacy to follow daily and will make changes to dose and/or frequency based on clinical status.   Shailesh Razo, PharmD  [phone]

## 2018-01-25 NOTE — PROGRESS NOTES
Problem: Self Care Deficits Care Plan (Adult)  Goal: *Acute Goals and Plan of Care (Insert Text)  Occupational Therapy Goals  Initiated 1/25/2018 within 7 day(s). 1.  Patient will perform lower body dressing with setup/S and AE prn for increased independence with ADLs. 2.  Patient will perform grooming with supervision/set-up while standing at sink. 3.  Patient will perform toilet transfers with mod I for toileting. 4.  Patient will perform all aspects of toileting with mod I  5. Patient will perform upper extremity therapeutic exercise/activities with  supervision/set-up for 15 minutes. 6.  Patient will utilize energy conservation techniques during functional activities with verbal cue(s). Goals reviewed and updated. Occupational Therapy Goals  Initiated 1/18/2018 within 7 day(s). 1.  Patient will perform lower body dressing with  moderate assistance  while sitting on EOB  2. Patient will perform grooming with supervision/set-up while standing at sink. 3.  Patient will perform toilet transfers with  minimal assistance/contact guard assist.  4.  Patient will perform all aspects of toileting with  minimal assistance/contact guard assist.  5.  Patient will perform upper extremity therapeutic exercise/activities with  supervision/set-up for 15 minutes. 6.  Patient will utilize energy conservation techniques during functional activities with verbal cue(s). Outcome: Progressing Towards Goal  Occupational Therapy ReEVALUATION    Patient: Abdoul Crespo (89 y.o. female)  Date: 1/25/2018  Diagnosis: Sepsis ([address]  Cellulitis of abdominal wall  Cellulitis of groin  Renal insufficiency  UTI (urinary tract infection)  SIRS (systemic inflammatory response syndrome) (HCC)  UTI (urinary tract infection)  Abdominal wall cellulitis Cellulitis of abdominal wall       Precautions: Fall    ASSESSMENT :  Pt is progressing towards goals.   Pt demonstrates increased independence with functional mobility and self care needs. Neil LE edema limiting pt independence with LB ADL. Min A overall for LB ADL completion. Pt demonstrates S with sit to stand transfers from recliner and S for room ambulation using RW with SOB noted requiring rest break often. Pt had just finished ambulating to/from bathroom with nurse for toileting when OT arrived to room. Pt declined LE elevation in recliner, but agreeable to get in bed for LE elevation. Pt encouraged to perform ankle pumps and performed 1 set x 10 reps of same during OT session. Recommend home with [place] therapy at d/c. Cont OT      Patient will benefit from skilled intervention to address the above impairments. Patients rehabilitation potential is considered to be Good  Factors which may influence rehabilitation potential include:   []                None noted  []                Mental ability/status  []                Medical condition  []                Home/family situation and support systems  []                Safety awareness  []                Pain tolerance/management  []                Other:      PLAN :  Recommendations and Planned Interventions:  [x]                  Self Care Training                  [x]           Therapeutic Activities  [x]                  Functional Mobility Training    []           Cognitive Retraining  [x]                  Therapeutic Exercises           [x]           Endurance Activities  [x]                  Balance Training                   []           Neuromuscular Re-Education  []                  Visual/Perceptual Training     []      Home Safety Training  [x]                  Patient Education                 []           Family Training/Education  []                  Other (comment):    Frequency/Duration: Patient will be followed by occupational therapy 3 times a week to address goals.   Discharge Recommendations: Home Health  Further Equipment Recommendations for Discharge: N/A     SUBJECTIVE:   Patient stated I don't feel like it right now.     OBJECTIVE DATA SUMMARY:   G-Codes (GP)  Self Care    Current  CJ= 20-39%   Goal  CI= 1-19%    The severity rating is based on the Level of Assistance required for Functional Mobility and ADLs. Hospital course since last seen and reason for reevaluation: post 7 days  Cognitive/Behavioral Status:  Neurologic State: Alert, Eyes open spontaneously  Orientation Level: Oriented X4  Cognition: Appropriate decision making, Appropriate for age attention/concentration, Appropriate safety awareness, Follows commands     Edema: brady LE     Coordination:  Coordination: Within functional limits     Balance:  Sitting: Intact  Standing: Intact; With support  Standing - Static: Good  Standing - Dynamic : Fair  Strength:  Strength: Generally decreased, functional     Tone & Sensation:  Tone: Normal     Range of Motion:  AROM: Generally decreased, functional  Functional Mobility and Transfers for ADLs:  Bed Mobility:        Sit to Supine: Minimum assistance; Other (comment) (assist to bring edematous LE's onto bed)     Transfers:  Sit to Stand: Supervision   Bed to Chair: Supervision   Toilet Transfer : Supervision  ADL Assessment:  Feeding: Setup  Oral Facial Hygiene/Grooming: Setup  Bathing: Minimum assistance  Upper Body Dressing: Supervision;Setup  Lower Body Dressing: Minimum assistance  Toileting: Supervision     ADL Intervention:  Lower Body Dressing Assistance  Socks: Minimum assistance    Pain:  Pre-treatment: 0/10  Post-treatment: 0/10    Activity Tolerance:   SOB, requires rest breaks   Please refer to the flowsheet for vital signs taken during this treatment.   After treatment:   [] Patient left in no apparent distress sitting up in chair  [x] Patient left in no apparent distress in bed  [x] Call bell left within reach  [x] Nursing notified  [] Caregiver present  [] Bed alarm activated    COMMUNICATION/EDUCATION:   []    Home safety education was provided and the patient/caregiver indicated understanding. []    Patient/family have participated as able in goal setting and plan of care. [x]    Patient/family agree to work toward stated goals and plan of care. []    Patient understands intent and goals of therapy, but is neutral about his/her participation. []    Patient is unable to participate in goal setting and plan of care. .    Thank you for this referral.  Melissa Hubbard OTR/L  Time Calculation: 20 mins    The severity rating is based on the Level of Assistance required for Functional Mobility and ADLs.

## 2018-01-25 NOTE — PROGRESS NOTES
Attended IDRs. Per MD Carlos Eduardo Banegas, if pts WBCs decrease she will likely discharge tomorrow. Pt arranged with [phone]. CM will cont to follow. Care Management Interventions  PCP Verified by CM: Yes  Mode of Transport at Discharge:  Other (see comment) ( )  Transition of Care Consult (CM Consult): [address]: Yes  Physical Therapy Consult: Yes  Occupational Therapy Consult: Yes  Current Support Network: Lives with Spouse  Plan discussed with Pt/Family/Caregiver: Yes  Freedom of Choice Offered: Yes  Discharge Location  Discharge Placement: Home with home health

## 2018-01-25 NOTE — ROUTINE PROCESS
Bedside and Verbal shift change report given to Arsh Ibrahim RN (oncoming nurse) by Jacques Siemens RN (offgoing nurse). Report included the following information SBAR and Kardex.

## 2018-01-26 VITALS
DIASTOLIC BLOOD PRESSURE: 76 MMHG | HEIGHT: 59 IN | TEMPERATURE: 98.3 F | HEART RATE: 102 BPM | RESPIRATION RATE: 18 BRPM | OXYGEN SATURATION: 99 % | BODY MASS INDEX: 33.24 KG/M2 | WEIGHT: 164.9 LBS | SYSTOLIC BLOOD PRESSURE: 136 MMHG

## 2018-01-26 LAB
ANION GAP SERPL CALC-SCNC: 12 MMOL/L (ref 3–18)
BASOPHILS # BLD: 0 K/UL (ref 0–0.1)
BASOPHILS NFR BLD: 0 % (ref 0–3)
BUN SERPL-MCNC: 6 MG/DL (ref 7–18)
BUN/CREAT SERPL: 5 (ref 12–20)
CALCIUM SERPL-MCNC: 8.4 MG/DL (ref 8.5–10.1)
CHLORIDE SERPL-SCNC: 102 MMOL/L (ref 100–108)
CO2 SERPL-SCNC: 25 MMOL/L (ref 21–32)
CREAT SERPL-MCNC: 1.13 MG/DL (ref 0.6–1.3)
DIFFERENTIAL METHOD BLD: ABNORMAL
EOSINOPHIL # BLD: 6.3 K/UL (ref 0–0.4)
EOSINOPHIL NFR BLD: 38 % (ref 0–5)
ERYTHROCYTE [DISTWIDTH] IN BLOOD BY AUTOMATED COUNT: 16.1 % (ref 11.6–14.5)
GLUCOSE BLD STRIP.AUTO-MCNC: 91 MG/DL (ref 70–110)
GLUCOSE SERPL-MCNC: 96 MG/DL (ref 74–99)
HCT VFR BLD AUTO: 32.2 % (ref 35–45)
HGB BLD-MCNC: 10.6 G/DL (ref 12–16)
LYMPHOCYTES # BLD: 1.7 K/UL (ref 0.8–3.5)
LYMPHOCYTES NFR BLD: 10 % (ref 20–51)
MAGNESIUM SERPL-MCNC: 2.2 MG/DL (ref 1.6–2.6)
MCH RBC QN AUTO: 28.9 PG (ref 24–34)
MCHC RBC AUTO-ENTMCNC: 32.9 G/DL (ref 31–37)
MCV RBC AUTO: 87.7 FL (ref 74–97)
MONOCYTES # BLD: 0.5 K/UL (ref 0–1)
MONOCYTES NFR BLD: 3 % (ref 2–9)
NEUTS BAND NFR BLD MANUAL: 3 % (ref 0–5)
NEUTS SEG # BLD: 7.7 K/UL (ref 1.8–8)
NEUTS SEG NFR BLD: 46 % (ref 42–75)
PHOSPHATE SERPL-MCNC: 2.7 MG/DL (ref 2.5–4.9)
PLATELET # BLD AUTO: 221 K/UL (ref 135–420)
PMV BLD AUTO: 9.1 FL (ref 9.2–11.8)
POTASSIUM SERPL-SCNC: 3.8 MMOL/L (ref 3.5–5.5)
RBC # BLD AUTO: 3.67 M/UL (ref 4.2–5.3)
RBC MORPH BLD: ABNORMAL
SODIUM SERPL-SCNC: 139 MMOL/L (ref 136–145)
WBC # BLD AUTO: 16.7 K/UL (ref 4.6–13.2)

## 2018-01-26 PROCEDURE — 74011250637 HC RX REV CODE- 250/637: Performed by: INTERNAL MEDICINE

## 2018-01-26 PROCEDURE — 85025 COMPLETE CBC W/AUTO DIFF WBC: CPT | Performed by: HOSPITALIST

## 2018-01-26 PROCEDURE — 84100 ASSAY OF PHOSPHORUS: CPT | Performed by: HOSPITALIST

## 2018-01-26 PROCEDURE — 74011000250 HC RX REV CODE- 250: Performed by: HOSPITALIST

## 2018-01-26 PROCEDURE — 74011250637 HC RX REV CODE- 250/637: Performed by: HOSPITALIST

## 2018-01-26 PROCEDURE — 74011250636 HC RX REV CODE- 250/636: Performed by: INTERNAL MEDICINE

## 2018-01-26 PROCEDURE — 80048 BASIC METABOLIC PNL TOTAL CA: CPT | Performed by: HOSPITALIST

## 2018-01-26 PROCEDURE — 83735 ASSAY OF MAGNESIUM: CPT | Performed by: HOSPITALIST

## 2018-01-26 PROCEDURE — 82962 GLUCOSE BLOOD TEST: CPT

## 2018-01-26 RX ORDER — LEVOFLOXACIN 500 MG/1
500 TABLET, FILM COATED ORAL DAILY
Qty: 5 TAB | Refills: 0 | Status: SHIPPED | OUTPATIENT
Start: 2018-01-26 | End: 2018-01-31

## 2018-01-26 RX ORDER — NYSTATIN 100000 [USP'U]/G
POWDER TOPICAL 2 TIMES DAILY
Qty: 60 G | Refills: 0 | Status: SHIPPED | OUTPATIENT
Start: 2018-01-26 | End: 2021-07-07

## 2018-01-26 RX ORDER — SPIRONOLACTONE 25 MG/1
25 TABLET ORAL DAILY
Qty: 30 TAB | Refills: 0 | Status: SHIPPED | OUTPATIENT
Start: 2018-01-26

## 2018-01-26 RX ORDER — HYDROCHLOROTHIAZIDE 25 MG/1
25 TABLET ORAL DAILY
Qty: 30 TAB | Refills: 0 | Status: SHIPPED | OUTPATIENT
Start: 2018-01-26

## 2018-01-26 RX ADMIN — SUCRALFATE 1 G: 1 TABLET ORAL at 09:14

## 2018-01-26 RX ADMIN — HYDROCHLOROTHIAZIDE 25 MG: 25 TABLET ORAL at 09:14

## 2018-01-26 RX ADMIN — ASPIRIN 325 MG ORAL TABLET 325 MG: 325 PILL ORAL at 09:14

## 2018-01-26 RX ADMIN — FAMOTIDINE 20 MG: 20 TABLET ORAL at 09:14

## 2018-01-26 RX ADMIN — SODIUM CHLORIDE 1000 MG: 900 INJECTION, SOLUTION INTRAVENOUS at 09:15

## 2018-01-26 RX ADMIN — METRONIDAZOLE 500 MG: 500 INJECTION, SOLUTION INTRAVENOUS at 04:12

## 2018-01-26 RX ADMIN — NYSTATIN: 100000 POWDER TOPICAL at 09:15

## 2018-01-26 RX ADMIN — ALLOPURINOL 300 MG: 300 TABLET ORAL at 09:14

## 2018-01-26 RX ADMIN — SPIRONOLACTONE 25 MG: 25 TABLET, FILM COATED ORAL at 09:14

## 2018-01-26 NOTE — PROGRESS NOTES
Problem: Mobility Impaired (Adult and Pediatric)  Goal: *Acute Goals and Plan of Care (Insert Text)  Physical Therapy Goals  Initiated 1/18/2018 and to be accomplished within 3-7 day(s)  1. Patient will move from supine to sit and sit to supine  in bed with supervision/set-up. 2.  Patient will transfer from bed to chair and chair to bed with supervision/set-up using the least restrictive device. 3.  Patient will perform sit to stand with supervision/set-up. 4.  Patient will ambulate with supervision/set-up for 150 feet with the least restrictive device. 5.  Patient will ascend/descend 1 stairs without handrail(s) with minimal assistance/contact guard assist.   Outcome: Progressing Towards Goal  Pt refused PT due to:  []  Nausea/vomiting  []  Eating  []  Pain  []  Pt lethargic  [x]  Other, Pt d/c from hospital at this time. Thank you.   Eddy Nichols, PT

## 2018-01-26 NOTE — PROGRESS NOTES
Chart reviewed. Pt to discharge home today with [facility] and family assistance. Care Management Interventions  PCP Verified by CM: Yes  Mode of Transport at Discharge:  Other (see comment) ( )  Transition of Care Consult (CM Consult): [address]: Yes  Physical Therapy Consult: Yes  Occupational Therapy Consult: Yes  Current Support Network: Lives with Spouse  Plan discussed with Pt/Family/Caregiver: Yes  Freedom of Choice Offered: Yes  Discharge Location  Discharge Placement: Home with home health

## 2018-01-26 NOTE — PROGRESS NOTES
Pts family called CM asking for a RW. Pt had previously told CM she has RW. Pt now stating she does not have RW, but has cane. CM explained if pt has received RW in last 5 years, insurance will not pay for it. Pt and pts family verbalized understanding. Given RW from stock.

## 2018-01-26 NOTE — PROGRESS NOTES
2349 Assumed care of pt from [address]. Pt resting in recliner , no signs of distress, call bell within reach. 1000 Verbal order from Dr Edie Castro to discontinue PICC after Levaquin given, remove prior to discharge    1045 Discharge paperwork went over in room with pt,  and daughter. Family did not have any questions at this time. Will dc as soon as vanc is finished infusing      1105 PICC line removed, pt reclined in recliner, pressure held for 5 minutes, no bleeding or hematoma noted, pt tolerated removal, instructed to stay reclined until transport comes     Shift Summary- Shift uneventful. Pt rested throughout shift. Denied chest pain or shortness of breath.

## 2018-01-26 NOTE — PROGRESS NOTES
Dual AVS reviewed with DUANE Bravo RN. All medications reviewed individually with patient. Opportunities for questions and concerns provided. Patient discharged via (mode of transport ie. Car, ambulance or air transport) car  Patient's arm band appropriately discarded.         Pt waiting on transportation

## 2018-01-26 NOTE — DISCHARGE INSTRUCTIONS
DISCHARGE SUMMARY from Nurse    PATIENT INSTRUCTIONS:    After general anesthesia or intravenous sedation, for 24 hours or while taking prescription Narcotics:  · Limit your activities  · Do not drive and operate hazardous machinery  · Do not make important personal or business decisions  · Do  not drink alcoholic beverages  · If you have not urinated within 8 hours after discharge, please contact your surgeon on call. Report the following to your surgeon:  · Excessive pain, swelling, redness or odor of or around the surgical area  · Temperature over 100.5  · Nausea and vomiting lasting longer than 4 hours or if unable to take medications  · Any signs of decreased circulation or nerve impairment to extremity: change in color, persistent  numbness, tingling, coldness or increase pain  · Any questions    What to do at Home:  Recommended activity: Activity as tolerated    *  Please give a list of your current medications to your Primary Care Provider. *  Please update this list whenever your medications are discontinued, doses are      changed, or new medications (including over-the-counter products) are added. *  Please carry medication information at all times in case of emergency situations. These are general instructions for a healthy lifestyle:    No smoking/ No tobacco products/ Avoid exposure to second hand smoke  Surgeon General's Warning:  Quitting smoking now greatly reduces serious risk to your health. Obesity, smoking, and sedentary lifestyle greatly increases your risk for illness    A healthy diet, regular physical exercise & weight monitoring are important for maintaining a healthy lifestyle    You may be retaining fluid if you have a history of heart failure or if you experience any of the following symptoms:  Weight gain of 3 pounds or more overnight or 5 pounds in a week, increased swelling in our hands or feet or shortness of breath while lying flat in bed.   Please call your doctor as soon as you notice any of these symptoms; do not wait until your next office visit. Recognize signs and symptoms of STROKE:    F-face looks uneven    A-arms unable to move or move unevenly    S-speech slurred or non-existent    T-time-call 911 as soon as signs and symptoms begin-DO NOT go       Back to bed or wait to see if you get better-TIME IS BRAIN. Warning Signs of HEART ATTACK     Call 911 if you have these symptoms:   Chest discomfort. Most heart attacks involve discomfort in the center of the chest that lasts more than a few minutes, or that goes away and comes back. It can feel like uncomfortable pressure, squeezing, fullness, or pain.  Discomfort in other areas of the upper body. Symptoms can include pain or discomfort in one or both arms, the back, neck, jaw, or stomach.  Shortness of breath with or without chest discomfort.  Other signs may include breaking out in a cold sweat, nausea, or lightheadedness. Don't wait more than five minutes to call 911 - MINUTES MATTER! Fast action can save your life. Calling 911 is almost always the fastest way to get lifesaving treatment. Emergency Medical Services staff can begin treatment when they arrive -- up to an hour sooner than if someone gets to the hospital by car. The discharge information has been reviewed with the patient. The patient verbalized understanding. Discharge medications reviewed with the patient and appropriate educational materials and side effects teaching were provided.   ___________________________________________________________________________________________________________________________________    Patient armband removed and shredded

## 2018-01-26 NOTE — PROGRESS NOTES
Problem: Falls - Risk of  Goal: *Absence of Falls  Document Santiago Fall Risk and appropriate interventions in the flowsheet.    Outcome: Progressing Towards Goal  Fall Risk Interventions:  Mobility Interventions: Patient to call before getting OOB    Mentation Interventions: Door open when patient unattended    Medication Interventions: Patient to call before getting OOB, Teach patient to arise slowly    Elimination Interventions: Call light in reach    History of Falls Interventions: Door open when patient unattended

## 2018-01-26 NOTE — ROUTINE PROCESS
Bedside and Verbal shift change report given to Jacques Siemens RN (oncoming nurse) by Juliana Anderson RN (offgoing nurse). Report included the following information SBAR, Kardex, ED Summary, Procedure Summary, Intake/Output, MAR, Accordion, Recent Results and Med Rec Status.

## 2018-01-26 NOTE — ROUTINE PROCESS
Bedside and Verbal shift change report given to Lamin Boogie RN (oncoming nurse) by Shelbi Palm RN (offgoing nurse). Report included the following information SBAR and Kardex.

## 2018-01-26 NOTE — PROGRESS NOTES
Pt had uneventful shift.  Rested well, vitals stable, no complaints of pain or SOB, no distress noted

## 2018-01-26 NOTE — PROGRESS NOTES
Problem: Falls - Risk of  Goal: *Absence of Falls  Document Santiago Fall Risk and appropriate interventions in the flowsheet.    Outcome: Progressing Towards Goal  Fall Risk Interventions:  Mobility Interventions: Bed/chair exit alarm, PT Consult for mobility concerns, PT Consult for assist device competence, Patient to call before getting OOB    Mentation Interventions: Bed/chair exit alarm, Adequate sleep, hydration, pain control, Increase mobility, Reorient patient    Medication Interventions: Bed/chair exit alarm, Patient to call before getting OOB, Teach patient to arise slowly    Elimination Interventions: Call light in reach, Toilet paper/wipes in reach, Toileting schedule/hourly rounds    History of Falls Interventions: Bed/chair exit alarm, Investigate reason for fall, Room close to nurse's station

## 2018-01-26 NOTE — DISCHARGE SUMMARY
Discharge Summary    Patient: Luis F Fernandez MRN: [id number]  CSN: [id number]    YOB: 1945  Age: 81 y.o.   Sex: female    DOA: 1/15/2018 LOS:  LOS: 11 days   Discharge Date:      Primary Care Provider:  Ava Wolff MD    Admission Diagnoses: Sepsis [facility])  Cellulitis of abdominal wall  Cellulitis of groin  Renal insufficiency  UTI (urinary tract infection)  SIRS (systemic inflammatory response syndrome) (HCC)  UTI (urinary tract infection)  Abdominal wall cellulitis    Discharge Diagnoses:    Hospital Problems  Date Reviewed: 1/15/2018          Codes Class Noted POA    Hypoalbuminemia ICD-10-CM: E88.09  ICD-9-CM: 273.8  1/25/2018 Unknown        Hypokalemia ICD-10-CM: E87.6  ICD-9-CM: 276.8  1/22/2018 Unknown        Hypomagnesemia ICD-10-CM: E83.42  ICD-9-CM: 275.2  1/22/2018 Unknown        Leukocytosis ICD-10-CM: D72.829  ICD-9-CM: 288.60  1/21/2018 Unknown        Acute encephalopathy ICD-10-CM: G93.40  ICD-9-CM: 348.30  1/18/2018 Unknown        Rash ICD-10-CM: R21  ICD-9-CM: 782.1  1/18/2018 Unknown        Hypotension ICD-10-CM: I95.9  ICD-9-CM: 458.9  1/18/2018 Unknown        Severe sepsis ([address] ICD-10-CM: A41.9, R65.20  ICD-9-CM: 038.9, 995.92  1/18/2018 Unknown        Proctitis ICD-10-CM: K62.89  ICD-9-CM: 569.49  1/17/2018 Unknown        * (Principal)Cellulitis of abdominal wall ICD-10-CM: H28.998  ICD-9-CM: 682.2  1/15/2018 Unknown        Cellulitis of groin ICD-10-CM: L03.314  ICD-9-CM: 682.2  1/15/2018 Unknown        UTI (urinary tract infection) ICD-10-CM: N39.0  ICD-9-CM: 599.0  1/15/2018 Unknown        Renal insufficiency ICD-10-CM: N28.9  ICD-9-CM: 593.9  1/15/2018 Unknown        Sepsis ([address] ICD-10-CM: A41.9  ICD-9-CM: 038.9, 995.91  1/15/2018 Unknown        GERD (gastroesophageal reflux disease) ICD-10-CM: K21.9  ICD-9-CM: 530.81  1/15/2018 Unknown        HLD (hyperlipidemia) ICD-10-CM: E78.5  ICD-9-CM: 272.4  1/15/2018 Unknown        Moderate dehydration ICD-10-CM: E86.0  ICD-9-CM: 276.51  1/15/2018 Unknown        Diastolic CHF, chronic (HCC) ICD-10-CM: I50.32  ICD-9-CM: 428.32, 428.0  1/15/2018 Unknown        SIRS (systemic inflammatory response syndrome) ([facility] ICD-10-CM: R65.10  ICD-9-CM: 995.90  1/15/2018 Unknown        Abdominal wall cellulitis ICD-10-CM: H85.264  ICD-9-CM: 682.2  1/15/2018 Unknown        HTN (hypertension) ICD-10-CM: I10  ICD-9-CM: 401.9  11/4/2013 Yes              Discharge Condition: Stable    Discharge Medications:     Current Discharge Medication List      START taking these medications    Details   nystatin (MYCOSTATIN) powder Apply  to affected area two (2) times a day. Qty: 60 g, Refills: 0      levoFLOXacin (LEVAQUIN) 500 mg tablet Take 1 Tab by mouth daily for 5 days. Qty: 5 Tab, Refills: 0         CONTINUE these medications which have NOT CHANGED    Details   allopurinol (ZYLOPRIM) 300 mg tablet Take 300 mg by mouth daily. spironolactone (ALDACTONE) 25 mg tablet Take  by mouth daily. hydroCHLOROthiazide (HYDRODIURIL) 25 mg tablet Take 25 mg by mouth daily. sucralfate (CARAFATE) 1 gram tablet Take 1 Tab by mouth four (4) times daily. Qty: 60 Tab, Refills: 2      nortriptyline (PAMELOR) 25 mg capsule Take 25 mg by mouth nightly. lovastatin (MEVACOR) 40 mg tablet Take 40 mg by mouth nightly. cyclobenzaprine (FLEXERIL) 10 mg tablet Take 10 mg by mouth three (3) times daily as needed for Muscle Spasm(s). aspirin (ASPIRIN) 325 mg tablet Take 325 mg by mouth daily. famotidine (PEPCID) 20 mg tablet Take 20 mg by mouth two (2) times a day.        ezetimibe (ZETIA) 10 mg tablet Take 10 mg by mouth nightly.          STOP taking these medications       losartan (COZAAR) 100 mg tablet Comments:   Reason for Stopping:               Procedures : central line and picc line     Consults: Pulmonary/Critical Care      PHYSICAL EXAM  Visit Vitals    /76    Pulse (!) 102    Temp 98.3 °F (36.8 °C)    Resp 18    Ht 4' 11\" (1.499 m)    Wt 74.8 kg (164 lb 14.5 oz)    SpO2 99%    BMI 33.31 kg/m2     General: Awake, cooperative, no acute distress    HEENT: NC, Atraumatic. PERRLA, EOMI. Anicteric sclerae. Lungs:  CTA Bilaterally. No Wheezing/Rhonchi/Rales. Heart:  Regular  rhythm,  No murmur, No Rubs, No Gallops  Abdomen: Soft, Non distended, Non tender. +Bowel sounds,   Extremities: No c/c/e  Psych:   Not anxious or agitated. Neurologic:  No acute neurological deficits. Admission HPI :   Gus Sahu is a 81 y.o. female who comes to the ed with complaints of abd pain. Patient states she has been having abd pain for the past week or so but it was very severe today therefore came to the ed. Due to this pain, she has not been eating and drinking much. She states pain is on the outside of the abd on the skin pain, its tender and warm to touch. It sharp and constant in nature with 10/10 when worst. No nausea vomiting and other complaints. Denies any fevers, chills, urinary symptoms at home. Does report of dry mouth. Other medical conditions stable at this time and takes her meds as prescribed. In the ED she was noted to have elevated WBC, HR and Cr. CT A/P done did not show any acute findings. On physical exam she was noted to have erythematous region of her lower abd    Hospital Course :   She presented sever sepsis on admission due to cellulitis. Broad coverage abx with antifungal medication were administrated. She presented fever and leukocytosis. Blood cx was repeated and no growing. Iv steroid was given for rash. She was transferred to icu due to hypotension. Central line was placed. She was transferred back to the floor after bp imprShe presented sever sepsis on admission due to cellulitis. Broad coverage abx with antifungal medication were administrated. She presented fever and leukocytosis. Blood cx was repeated and no growing. Iv steroid was given for rash.  She was transferred to icu due to hyoved. With the treatment, her leukocytosis down to 16 k from 31 K and she remained afebrile. Local wound care was on board and cellulitis resolving on discharge. Activity: Activity as tolerated    Diet: Cardiac Diet    [facility]     Disposition: home with home health     Minutes spent on discharge: 50 min       Labs: Results:       Chemistry Recent Labs      01/26/18 0415 01/25/18   0410  01/24/18   0810   GLU  96  89  95   NA  139  141  142   K  3.8  3.8  3.6   CL  102  106  105   CO2  25  27  27   BUN  6*  6*  8   CREA  1.13  0.97  1.00   CA  8.4*  8.3*  8.3*   AGAP  12  8  10   BUCR  5*  6*  8*      CBC w/Diff Recent Labs      01/26/18 0415 01/25/18 0410 01/24/18   0810   WBC  16.7*  17.9*  21.8*   RBC  3.67*  3.57*  3.81*   HGB  10.6*  10.3*  11.2*   HCT  32.2*  31.3*  33.4*   PLT  221  210  186   GRANS  46  44  53   LYMPH  10*  12*  15*   EOS  38*  37*  24*      Cardiac Enzymes No results for input(s): CPK, CKND1, CHRISTINA in the last 72 hours. No lab exists for component: CKRMB, TROIP   Coagulation No results for input(s): PTP, INR, APTT in the last 72 hours. No lab exists for component: INREXT    Lipid Panel No results found for: CHOL, CHOLPOCT, CHOLX, CHLST, CHOLV, 262804, HDL, LDL, LDLC, DLDLP, 239153, VLDLC, VLDL, TGLX, TRIGL, TRIGP, TGLPOCT, CHHD, CHHDX   BNP No results for input(s): BNPP in the last 72 hours. Liver Enzymes No results for input(s): TP, ALB, TBIL, AP, SGOT, GPT in the last 72 hours. No lab exists for component: DBIL   Thyroid Studies No results found for: T4, T3U, TSH, TSHEXT         Significant Diagnostic Studies: Ct Femur Rt Wo Cont    Result Date: 1/21/2018  EXAM: CT right femur INDICATION: Right thigh cellulitis COMPARISON: None. TECHNIQUE: Axial CT imaging of the right lower extremity was performed without intravenous contrast. Dose reduction techniques used:  Automated exposure control, adjustment of the mAs and/or kVp according to patient's size, and iterative reconstruction techniques. The specific techniques utilized on this CT exam have been documented in the patient's electronic medical record. _______________ FINDINGS: There is diffuse subcutaneous inflammatory stranding extending from the right hip to the anterior thigh above the knee. There is no fluid collection or abscess identified. There is no bony erosion or fracture. Mild atherosclerosis is noted. A dystrophic calcified structure is seen medial to the femur and the proximal thigh. _______________     IMPRESSION: Right hip and anterior thigh cellulitis without evidence of abscess    Ct Abd Pelv Wo Cont    Result Date: 1/18/2018  EXAM: CT of the Abdomen and Pelvis INDICATION: Abdominal wall cellulitis, questionable abscess formation, patient still febrile. COMPARISON: 01/15/2018 TECHNIQUE: Axial CT imaging of the abdomen and pelvis was performed without intravenous contrast. Multiplanar reformats were generated. Dose reduction techniques used: Automated exposure control, adjustment of the mAs and/or kVp according to patient's size, and iterative reconstruction techniques. _______________ FINDINGS: LOWER CHEST: Right middle lobe and left lower lung persistent atelectasis. No consolidation or pleural effusion. No convincing pericardial effusion. Large hiatus hernia, similar to prior exam.. LIVER, BILIARY: Noncontrast evaluation of the liver demonstrates an unchanged stable hypodensity in the left lobe, too small to characterize, probably benign. No acute or interval change. No biliary dilation. Gallbladder is unremarkable. PANCREAS: No acute abnormality. SPLEEN: Normal. ADRENALS: Normal. KIDNEYS/URETERS/BLADDER: No findings of hydronephrosis. Interval developing mild left pararenal stranding and a tiny amount of fluid. No hydroureter. Decompressed bladder with Gonzalez catheter in place. LYMPH NODES: No enlarged lymph nodes. GASTROINTESTINAL TRACT: No bowel dilation or wall thickening.  PELVIC ORGANS: Multiple coarse calcification stations within the uterus, in keeping with degenerative leiomyoma. VASCULATURE: Atherosclerotic vascular calcification of the abdominal aorta without aneurysm. BONES: No acute or aggressive osseous abnormalities identified. Stable CT appearance of multilevel severe degenerative disc disease notably at T12-L2, L4-5 and L5-S1. Stable endplate degenerative sclerosis and irregularity prior exams with degenerative partial vertebral body fusion at L1-2. Stable minimal grade 1 anterolisthesis of L4/5. Severe left hip DJD. OTHER: -Trace amount of fluid in the upper left paracolic gutter with minimal left pararenal fascial thickening. -Interval developing left greater the right soft tissue anasarca with left abdominal to pelvic dermal thickening with soft tissue stranding. _______________     IMPRESSION: 1. Nonspecific CT appearance of interval mild developing left pararenal stranding, tiny amount of perinephric fluid, small amount of fluid in the upper left paracolic gutter with localized left perirenal fascial thickening. Diagnostic considerations may include infectious etiology such as left-sided pyelonephritis. Recommend clinical correlation to include urinalysis. 2. Developing soft tissue anasarca, left greater the right with nonspecific left abdominal to pelvic dermal thickening with soft tissue stranding. Diagnostic considerations include cellulitis and/or nonspecific edema. Otherwise, no discrete organized fluid collection on this noncontrast exam.     Ct Abd Pelv Wo Cont    Result Date: 1/15/2018  EXAM: CT of the abdomen and pelvis INDICATION: Abdominal pain COMPARISON: January 1, 2017 TECHNIQUE: Axial CT imaging of the abdomen and pelvis was performed without intravenous contrast. Multiplanar reformats were generated. Dose reduction techniques used: Automated exposure control, adjustment of the mAs and/or kVp according to patient's size, and iterative reconstruction techniques.  The specific techniques utilized on this CT exam have been documented in the patient's electronic medical record. _______________ FINDINGS: LOWER CHEST: There is dependent atelectasis in the lung bases. LIVER, BILIARY: Within the left hepatic lobe, there is a stable hypodensity too small to adequately characterize, likely benign. No biliary dilation. Gallbladder is unremarkable. PANCREAS: Normal. SPLEEN: Normal. ADRENALS: Normal. KIDNEYS/URETERS/BLADDER: Normal. PELVIC ORGANS: The uterus contains multiple calcifications. VASCULATURE: Unremarkable LYMPH NODES: No enlarged lymph nodes. GASTROINTESTINAL TRACT: A large hiatal hernia is present. No bowel dilation or wall thickening. Improved appearance of rectal wall thickening and inflammatory changes. BONES: No acute or aggressive osseous abnormalities identified. Severe left hip degenerative changes OTHER: None. _______________     IMPRESSION: 1. Improved at imaging findings related to proctitis 2. No acute findings within the abdomen or pelvis since the previous study     Xr Chest Port    Result Date: 1/19/2018  EXAM: Portable upright chest, one view INDICATION: Hypoxia COMPARISON: Chest x-ray 1/18/2018, CT abdomen 1/18/2018 _______________ FINDINGS: Right jugular central venous catheter tip projects at the distal SVC. Cardiac silhouette and pulmonary vascularity are normal. Slight interval improvement of right basilar subsegmental atelectasis. Eventration of the right hemidiaphragm is unchanged. Hazy opacity is present across the left hemithorax base without change most or all of which is secondary to the patient's known hiatal hernia. Adjacent atelectasis not excluded. _______________     IMPRESSION: Slight interval improvement of right basilar subsegmental atelectasis. Hiatal hernia plus or minus adjacent left basilar atelectasis without change. Xr Chest Port    Result Date: 1/18/2018  Portable chest HISTORY: Central line placement.  FINDINGS: Cardiac size is normal. Lungs mildly hypoinflated with bandlike opacities in lung bases compatible with atelectasis. Elevation right hemidiaphragm. Normal pulmonary vasculature. Thoracic spondylosis. Severe bilateral shoulder osteoarthritis. Right central venous catheter tip at cavoatrial junction. IMPRESSION: Bibasilar atelectasis without significant change. Interval placement right central venous catheter in good position. Xr Chest Port    Result Date: 1/18/2018  EXAM:Chest X-Ray  History: Hypoxia Technique:  Portable Frontal View Comparison: 01/15/2018, 01/01/2017 _______________ FINDINGS: Pulmonary hypoinflation rotation degraded exam. Redemonstration of a large hiatus hernia with minimal hazy reticular opacities in both lower lungs. Stable cardiac silhouette and mediastinal contours left rotation degraded exam. No pneumothorax or effusion. Stable intact osseous structures _______________     IMPRESSION: 1. Pulmonary hypoinflation and rotation degraded examination with bilateral lower lung opacities, probably representing atelectasis. 2. Large hiatus hernia. Xr Chest Port    Result Date: 1/16/2018  CHEST AP PORTABLE Indication: Fever and shortness of breath. Comparison: 01/01/2017. Findings: The lungs appear clear. No evidence for pneumothorax or pleural effusion. Cardiac silhouette and pulmonary vascularity appear within normal limits. Aortic atherosclerosis. Again noted is moderate to large hiatal hernia. IMPRESSION: No acute cardiopulmonary disease. Moderate to large hiatal hernia. Ir Fluoro Guide Picc Insertion    Result Date: 1/22/2018  EXAM: PICC Placement. CLINICAL: IV access. The procedure was performed following standard maximal barrier technique which includes cap, mask, sterile gown, sterile gloves, sterile full body drape, hand hygiene with alcohol foam, and 2% chlorhexidine for cutaneous antisepsis. Sterile ultrasound gel and a sterile cover for the ultrasound probe was used.  Fluoro dose (reference air kerma): 3  mGy. Time Out: 46 Start Time:1226 Stop Time: 4797 Procedure, risks, benefits, and alternatives were discussed with the patient and written, informed consent was obtained. Patient's left arm was prepped, as the patient has a history of prior right-sided lymphadenectomy. Timeout was observed. Lidocaine was used for local anesthesia. Using direct ultrasound visualization a 21 gauge needle was inserted into the left basilic vein which was patent. Permanent ultrasound recording placed in patient's chart. The guide wire was inserted and advanced under fluoroscopic guidance to the caval atrial junction. The distance between the caval atrial junction and skin puncture site was measured and a PICC line was cut to 38 cm length. The five [employer] double lumen PICC was inserted under fluoroscopic guidance. A frontal chest image was obtained to document catheter position, in the distal one third of the SVC at the cavo-atrial junction. The catheter was flushed with heparinized saline and a sterile dressing applied. GUIDANCE:  Ultrasound and fluoroscopy guidance was used to position (and confirm the position of) the needle / catheter. Image(s) saved in PACS: Ultrasound and fluoroscopy Findings: The left basilic vein was patent demonstrating normal grayscale appearance and compression. The tip of the PICC line is in good position in the distal one third SVC. Both lumens flushed easily and there is good blood return. Catheter length is 38 cm. IMPRESSION: Successful ultrasound and fluoroscopic guided insertion of a non-tunneled double lumen power PICC line via the left arm. PICC is ready for immediate use.              [facility]     CC: Naa Blackburn MD

## 2018-01-28 ENCOUNTER — HOME CARE VISIT (OUTPATIENT)
Dept: SCHEDULING | Facility: HOME HEALTH | Age: 73
End: 2018-01-28
Payer: MEDICARE

## 2018-01-28 PROCEDURE — 3331090001 HH PPS REVENUE CREDIT

## 2018-01-28 PROCEDURE — 3331090002 HH PPS REVENUE DEBIT

## 2018-01-28 PROCEDURE — 400013 HH SOC

## 2018-01-28 PROCEDURE — G0299 HHS/HOSPICE OF RN EA 15 MIN: HCPCS

## 2018-01-29 VITALS
RESPIRATION RATE: 16 BRPM | DIASTOLIC BLOOD PRESSURE: 78 MMHG | HEART RATE: 84 BPM | OXYGEN SATURATION: 98 % | SYSTOLIC BLOOD PRESSURE: 132 MMHG | TEMPERATURE: 98.5 F

## 2018-01-29 PROCEDURE — 3331090002 HH PPS REVENUE DEBIT

## 2018-01-29 PROCEDURE — 3331090001 HH PPS REVENUE CREDIT

## 2018-01-30 ENCOUNTER — PATIENT OUTREACH (OUTPATIENT)
Dept: CASE MANAGEMENT | Age: 73
End: 2018-01-30

## 2018-01-30 PROCEDURE — 3331090002 HH PPS REVENUE DEBIT

## 2018-01-30 PROCEDURE — 3331090001 HH PPS REVENUE CREDIT

## 2018-01-30 NOTE — PROGRESS NOTES
Hospital Discharge Follow-Up          Patient listed on discharge ([facility] [employer]) report on 1/29/18. Patient hospitalized at [facility] 1/15-1/26/18 for cellulitis of abdominal wall. RRAT score: 21 High    Medical History:     Past Medical History:   Diagnosis Date    Arthritis     Blood clot in vein 2004    left leg     Breast cancer ([address]     Cancer ([address]     right breast    GERD (gastroesophageal reflux disease)     High cholesterol     Hypertension     Other ill-defined conditions(179.89)     high cholestrol    Vertigo      NN attempted to contact patient at listed number. VM left identifying self, direct contact information given with request for return call. NN will attempt to contact patient at later time.

## 2018-01-31 ENCOUNTER — HOME CARE VISIT (OUTPATIENT)
Dept: SCHEDULING | Facility: HOME HEALTH | Age: 73
End: 2018-01-31
Payer: MEDICARE

## 2018-01-31 VITALS
DIASTOLIC BLOOD PRESSURE: 70 MMHG | OXYGEN SATURATION: 97 % | TEMPERATURE: 98.4 F | HEART RATE: 109 BPM | SYSTOLIC BLOOD PRESSURE: 100 MMHG

## 2018-01-31 PROCEDURE — G0151 HHCP-SERV OF PT,EA 15 MIN: HCPCS

## 2018-01-31 PROCEDURE — 3331090001 HH PPS REVENUE CREDIT

## 2018-01-31 PROCEDURE — G0299 HHS/HOSPICE OF RN EA 15 MIN: HCPCS

## 2018-01-31 PROCEDURE — 3331090002 HH PPS REVENUE DEBIT

## 2018-01-31 PROCEDURE — G0152 HHCP-SERV OF OT,EA 15 MIN: HCPCS

## 2018-02-01 ENCOUNTER — HOME CARE VISIT (OUTPATIENT)
Dept: HOME HEALTH SERVICES | Facility: HOME HEALTH | Age: 73
End: 2018-02-01
Payer: MEDICARE

## 2018-02-01 VITALS
TEMPERATURE: 98.4 F | HEART RATE: 110 BPM | DIASTOLIC BLOOD PRESSURE: 78 MMHG | SYSTOLIC BLOOD PRESSURE: 110 MMHG | OXYGEN SATURATION: 98 %

## 2018-02-01 PROCEDURE — 3331090001 HH PPS REVENUE CREDIT

## 2018-02-01 PROCEDURE — 3331090002 HH PPS REVENUE DEBIT

## 2018-02-02 ENCOUNTER — HOME CARE VISIT (OUTPATIENT)
Dept: HOME HEALTH SERVICES | Facility: HOME HEALTH | Age: 73
End: 2018-02-02
Payer: MEDICARE

## 2018-02-02 ENCOUNTER — HOME CARE VISIT (OUTPATIENT)
Dept: SCHEDULING | Facility: HOME HEALTH | Age: 73
End: 2018-02-02
Payer: MEDICARE

## 2018-02-02 PROCEDURE — 3331090002 HH PPS REVENUE DEBIT

## 2018-02-02 PROCEDURE — 3331090001 HH PPS REVENUE CREDIT

## 2018-02-02 PROCEDURE — G0157 HHC PT ASSISTANT EA 15: HCPCS

## 2018-02-03 PROCEDURE — 3331090002 HH PPS REVENUE DEBIT

## 2018-02-03 PROCEDURE — 3331090001 HH PPS REVENUE CREDIT

## 2018-02-04 PROCEDURE — 3331090002 HH PPS REVENUE DEBIT

## 2018-02-04 PROCEDURE — 3331090001 HH PPS REVENUE CREDIT

## 2018-02-05 PROCEDURE — 3331090002 HH PPS REVENUE DEBIT

## 2018-02-05 PROCEDURE — 3331090001 HH PPS REVENUE CREDIT

## 2018-02-06 ENCOUNTER — HOME CARE VISIT (OUTPATIENT)
Dept: SCHEDULING | Facility: HOME HEALTH | Age: 73
End: 2018-02-06
Payer: MEDICARE

## 2018-02-06 PROCEDURE — 3331090002 HH PPS REVENUE DEBIT

## 2018-02-06 PROCEDURE — G0157 HHC PT ASSISTANT EA 15: HCPCS

## 2018-02-06 PROCEDURE — 3331090001 HH PPS REVENUE CREDIT

## 2018-02-07 VITALS
TEMPERATURE: 98.2 F | OXYGEN SATURATION: 99 % | HEART RATE: 88 BPM | RESPIRATION RATE: 16 BRPM | DIASTOLIC BLOOD PRESSURE: 70 MMHG | SYSTOLIC BLOOD PRESSURE: 118 MMHG

## 2018-02-07 PROCEDURE — 3331090001 HH PPS REVENUE CREDIT

## 2018-02-07 PROCEDURE — 3331090002 HH PPS REVENUE DEBIT

## 2018-02-08 ENCOUNTER — HOME CARE VISIT (OUTPATIENT)
Dept: SCHEDULING | Facility: HOME HEALTH | Age: 73
End: 2018-02-08
Payer: MEDICARE

## 2018-02-08 PROCEDURE — 3331090001 HH PPS REVENUE CREDIT

## 2018-02-08 PROCEDURE — 3331090003 HH PPS REVENUE ADJ

## 2018-02-08 PROCEDURE — G0151 HHCP-SERV OF PT,EA 15 MIN: HCPCS

## 2018-02-08 PROCEDURE — 3331090002 HH PPS REVENUE DEBIT

## 2018-02-09 ENCOUNTER — PATIENT OUTREACH (OUTPATIENT)
Dept: CASE MANAGEMENT | Age: 73
End: 2018-02-09

## 2018-02-09 PROCEDURE — 3331090002 HH PPS REVENUE DEBIT

## 2018-02-09 PROCEDURE — 3331090001 HH PPS REVENUE CREDIT

## 2018-02-10 PROCEDURE — 3331090002 HH PPS REVENUE DEBIT

## 2018-02-10 PROCEDURE — 3331090001 HH PPS REVENUE CREDIT

## 2018-02-11 PROCEDURE — 3331090002 HH PPS REVENUE DEBIT

## 2018-02-11 PROCEDURE — 3331090001 HH PPS REVENUE CREDIT

## 2018-02-12 PROCEDURE — 3331090001 HH PPS REVENUE CREDIT

## 2018-02-12 PROCEDURE — 3331090002 HH PPS REVENUE DEBIT

## 2018-02-13 PROCEDURE — 3331090001 HH PPS REVENUE CREDIT

## 2018-02-13 PROCEDURE — 3331090002 HH PPS REVENUE DEBIT

## 2018-02-23 ENCOUNTER — PATIENT OUTREACH (OUTPATIENT)
Dept: CASE MANAGEMENT | Age: 73
End: 2018-02-23

## 2018-02-23 NOTE — PROGRESS NOTES
NN has been unable to contact patient since [facility] [employer] discharge on 1/26/18. Will resolve this episode.

## 2018-04-02 NOTE — ED TRIAGE NOTES
Constipated, unable to pass large, hard stool at this time, now having abdominal pain and rectal pain normal... Well appearing, well nourished, awake, alert, oriented to person, place, time/situation and in no apparent distress.

## 2020-04-07 ENCOUNTER — APPOINTMENT (OUTPATIENT)
Dept: GENERAL RADIOLOGY | Age: 75
End: 2020-04-07
Attending: PHYSICIAN ASSISTANT
Payer: MEDICARE

## 2020-04-07 ENCOUNTER — HOSPITAL ENCOUNTER (EMERGENCY)
Age: 75
Discharge: HOME | End: 2020-04-07
Attending: EMERGENCY MEDICINE
Payer: MEDICARE

## 2020-04-07 VITALS
RESPIRATION RATE: 14 BRPM | OXYGEN SATURATION: 100 % | TEMPERATURE: 96.8 F | SYSTOLIC BLOOD PRESSURE: 133 MMHG | DIASTOLIC BLOOD PRESSURE: 83 MMHG | HEART RATE: 79 BPM

## 2020-04-07 DIAGNOSIS — K59.00 CONSTIPATION, UNSPECIFIED CONSTIPATION TYPE: Primary | ICD-10-CM

## 2020-04-07 DIAGNOSIS — F11.90 OPIATE USE: ICD-10-CM

## 2020-04-07 LAB
ALBUMIN SERPL-MCNC: 3.1 G/DL (ref 3.4–5)
ALBUMIN/GLOB SERPL: 0.8 {RATIO} (ref 0.8–1.7)
ALP SERPL-CCNC: 102 U/L (ref 45–117)
ALT SERPL-CCNC: 22 U/L (ref 13–56)
ANION GAP SERPL CALC-SCNC: 5 MMOL/L (ref 3–18)
APPEARANCE UR: CLEAR
AST SERPL-CCNC: 21 U/L (ref 10–38)
BACTERIA URNS QL MICRO: ABNORMAL /HPF
BASOPHILS # BLD: 0 K/UL (ref 0–0.1)
BASOPHILS NFR BLD: 0 % (ref 0–2)
BILIRUB SERPL-MCNC: 0.3 MG/DL (ref 0.2–1)
BILIRUB UR QL: NEGATIVE
BUN SERPL-MCNC: 18 MG/DL (ref 7–18)
BUN/CREAT SERPL: 14 (ref 12–20)
CALCIUM SERPL-MCNC: 9.6 MG/DL (ref 8.5–10.1)
CHLORIDE SERPL-SCNC: 105 MMOL/L (ref 100–111)
CO2 SERPL-SCNC: 26 MMOL/L (ref 21–32)
COLOR UR: YELLOW
CREAT SERPL-MCNC: 1.33 MG/DL (ref 0.6–1.3)
DIFFERENTIAL METHOD BLD: NORMAL
EOSINOPHIL # BLD: 0.1 K/UL (ref 0–0.4)
EOSINOPHIL NFR BLD: 2 % (ref 0–5)
EPITH CASTS URNS QL MICRO: ABNORMAL /LPF (ref 0–5)
ERYTHROCYTE [DISTWIDTH] IN BLOOD BY AUTOMATED COUNT: 13.5 % (ref 11.6–14.5)
GLOBULIN SER CALC-MCNC: 4.1 G/DL (ref 2–4)
GLUCOSE SERPL-MCNC: 98 MG/DL (ref 74–99)
GLUCOSE UR STRIP.AUTO-MCNC: NEGATIVE MG/DL
HCT VFR BLD AUTO: 41.6 % (ref 35–45)
HGB BLD-MCNC: 13.6 G/DL (ref 12–16)
HGB UR QL STRIP: NEGATIVE
KETONES UR QL STRIP.AUTO: NEGATIVE MG/DL
LEUKOCYTE ESTERASE UR QL STRIP.AUTO: ABNORMAL
LIPASE SERPL-CCNC: 86 U/L (ref 73–393)
LYMPHOCYTES # BLD: 2 K/UL (ref 0.9–3.6)
LYMPHOCYTES NFR BLD: 27 % (ref 21–52)
MCH RBC QN AUTO: 29.7 PG (ref 24–34)
MCHC RBC AUTO-ENTMCNC: 32.7 G/DL (ref 31–37)
MCV RBC AUTO: 90.8 FL (ref 74–97)
MONOCYTES # BLD: 0.5 K/UL (ref 0.05–1.2)
MONOCYTES NFR BLD: 7 % (ref 3–10)
NEUTS SEG # BLD: 4.9 K/UL (ref 1.8–8)
NEUTS SEG NFR BLD: 64 % (ref 40–73)
NITRITE UR QL STRIP.AUTO: NEGATIVE
PH UR STRIP: 5 [PH] (ref 5–8)
PLATELET # BLD AUTO: 238 K/UL (ref 135–420)
PMV BLD AUTO: 9.4 FL (ref 9.2–11.8)
POTASSIUM SERPL-SCNC: 4.6 MMOL/L (ref 3.5–5.5)
PROT SERPL-MCNC: 7.2 G/DL (ref 6.4–8.2)
PROT UR STRIP-MCNC: NEGATIVE MG/DL
RBC # BLD AUTO: 4.58 M/UL (ref 4.2–5.3)
RBC #/AREA URNS HPF: ABNORMAL /HPF (ref 0–5)
SODIUM SERPL-SCNC: 136 MMOL/L (ref 136–145)
SP GR UR REFRACTOMETRY: 1.01 (ref 1–1.03)
UROBILINOGEN UR QL STRIP.AUTO: 0.2 EU/DL (ref 0.2–1)
WBC # BLD AUTO: 7.6 K/UL (ref 4.6–13.2)
WBC URNS QL MICRO: ABNORMAL /HPF (ref 0–5)

## 2020-04-07 PROCEDURE — 87086 URINE CULTURE/COLONY COUNT: CPT

## 2020-04-07 PROCEDURE — 74011250636 HC RX REV CODE- 250/636: Performed by: PHYSICIAN ASSISTANT

## 2020-04-07 PROCEDURE — 81001 URINALYSIS AUTO W/SCOPE: CPT

## 2020-04-07 PROCEDURE — 74011250637 HC RX REV CODE- 250/637: Performed by: PHYSICIAN ASSISTANT

## 2020-04-07 PROCEDURE — 99284 EMERGENCY DEPT VISIT MOD MDM: CPT

## 2020-04-07 PROCEDURE — 85025 COMPLETE CBC W/AUTO DIFF WBC: CPT

## 2020-04-07 PROCEDURE — 83690 ASSAY OF LIPASE: CPT

## 2020-04-07 PROCEDURE — 74018 RADEX ABDOMEN 1 VIEW: CPT

## 2020-04-07 PROCEDURE — 80053 COMPREHEN METABOLIC PANEL: CPT

## 2020-04-07 RX ORDER — POLYETHYLENE GLYCOL 3350 17 G/17G
17 POWDER, FOR SOLUTION ORAL DAILY
Qty: 289 G | Refills: 0 | OUTPATIENT
Start: 2020-04-07 | End: 2021-06-29

## 2020-04-07 RX ORDER — MAGNESIUM CITRATE
296 SOLUTION, ORAL ORAL
Status: COMPLETED | OUTPATIENT
Start: 2020-04-07 | End: 2020-04-07

## 2020-04-07 RX ADMIN — MAGNESIUM CITRATE 296 ML: 1.75 LIQUID ORAL at 10:28

## 2020-04-07 RX ADMIN — SODIUM CHLORIDE 1000 ML: 900 INJECTION, SOLUTION INTRAVENOUS at 10:28

## 2020-04-07 NOTE — ED TRIAGE NOTES
Pt to ED for rectal pain starting today, pt c/o constipation, but reports last BM was two days PTA. Pt denies N/V/D, abdominal pain, fever/chills.

## 2020-04-07 NOTE — ED PROVIDER NOTES
EMERGENCY DEPARTMENT HISTORY AND PHYSICAL EXAM    Date: 4/7/2020  Patient Name: Zulema Lesch    History of Presenting Illness     Chief Complaint   Patient presents with    Constipation         History Provided By: Patient    Chief Complaint: constipation    HPI(Context):   9:14 AM  Zulema Lesch is a 70 y.o. female with PMHX of BCA, HTN, HLP who presents to the emergency department C/O constipation. Associated sxs include nausea. Reports no BM for past 24-36 hours. Passed hard stool in ED while waiting for evaluation today. Pt has hx of constipation. Pt takes norco for hip pain. Pt takes colace for constipation. Pt denies fever, chills, vomiting, diarrhea, back pain, and any other sxs or complaints. PCP: Robert Gunderson MD    Current Outpatient Medications   Medication Sig Dispense Refill    polyethylene glycol (Miralax) 17 gram/dose powder Take 17 g by mouth daily. 1 tablespoon with 8 oz of water daily  Indications: constipation 289 g 0    ergocalciferol (VITAMIN D2) 50,000 unit capsule Take 50,000 Units by mouth every seven (7) days. Dr. Cheryl Dutta with [facility]  Indications: Vitamin D Deficiency      raNITIdine hcl 300 mg cap Take 1 Cap by mouth daily. Saman barclay with [facility] metoprolol ta-hydrochlorothiaz (LOPRESSOR HCT) 100-50 mg per tablet Take 1 Tab by mouth two (2) times a day. Indications: hypertension      nystatin (NYAMYC) powder Apply 1 g to affected area two (2) times a day. Indications: CUTANEOUS CANDIDIASIS, Diaper Rash      nystatin (MYCOSTATIN) powder Apply  to affected area two (2) times a day. 60 g 0    hydroCHLOROthiazide (HYDRODIURIL) 25 mg tablet Take 1 Tab by mouth daily. 30 Tab 0    spironolactone (ALDACTONE) 25 mg tablet Take 1 Tab by mouth daily. 30 Tab 0    sucralfate (CARAFATE) 1 gram tablet Take 1 Tab by mouth four (4) times daily. (Patient not taking: Reported on 2/8/2018) 60 Tab 2    lovastatin (MEVACOR) 40 mg tablet Take 40 mg by mouth nightly.  cyclobenzaprine (FLEXERIL) 10 mg tablet Take 10 mg by mouth three (3) times daily as needed for Muscle Spasm(s).  aspirin (ASPIRIN) 325 mg tablet Take 325 mg by mouth daily.  famotidine (PEPCID) 20 mg tablet Take 20 mg by mouth two (2) times a day. Past History     Past Medical History:  Past Medical History:   Diagnosis Date    Arthritis     Blood clot in vein 2004    left leg     Breast [facility] ([facility] [address]     Cancer ([facility] [address]     right breast    GERD (gastroesophageal reflux disease)     High cholesterol     Hypertension     Other ill-defined conditions(799.89)     high cholestrol    Vertigo        Past Surgical History:  Past Surgical History:   Procedure Laterality Date    ABDOMEN SURGERY PROC UNLISTED      removal of tumor abdomen    HX BREAST LUMPECTOMY      HX GI      colonosocpy    HX KNEE REPLACEMENT      HX ORTHOPAEDIC      total knee replacement bilateral    HX ORTHOPAEDIC      excision of cyst left hand    HX TUBAL LIGATION      HX TUMOR REMOVAL         Family History:  History reviewed. No pertinent family history. Social History:  Social History     Tobacco Use    Smoking status: Passive Smoke Exposure - Never Smoker    Smokeless tobacco: Never Used   Substance Use Topics    Alcohol use: No    Drug use: No       Allergies: Allergies   Allergen Reactions    Amiloride Nausea and Vomiting    Amoxicillin Rash    Bactrim [Sulfamethoprim Ds] Unknown (comments)    Gabapentin Rash    Ibuprofen Unknown (comments)    Zestoretic [Lisinopril-Hydrochlorothiazide] Unknown (comments)         Review of Systems   Review of Systems   Constitutional: Negative for chills and fever. Respiratory: Negative for cough. Gastrointestinal: Positive for constipation and nausea. Negative for abdominal pain, blood in stool, diarrhea and vomiting. Genitourinary: Negative for dysuria. Musculoskeletal: Negative for back pain.    All other systems reviewed and are negative. Physical Exam     Vitals:    04/07/20 0856 04/07/20 1346   BP: (!) 182/111 133/83   Pulse: (!) 102 79   Resp: 20 14   Temp: 95.3 °F (35.2 °C) 96.8 °F (36 °C)   SpO2: 100% 100%     Physical Exam  Vitals signs and nursing note reviewed. Constitutional:       General: She is not in acute distress. Appearance: She is well-developed. She is not diaphoretic. Comments: AA female in NAD. Alert. Sitting on toilet in ED room. HENT:      Head: Normocephalic and atraumatic. Right Ear: External ear normal.      Left Ear: External ear normal.      Nose: Nose normal.   Eyes:      General: No scleral icterus. Conjunctiva/sclera: Conjunctivae normal.   Neck:      Musculoskeletal: Normal range of motion. Cardiovascular:      Rate and Rhythm: Normal rate and regular rhythm. Heart sounds: Normal heart sounds. No murmur. No friction rub. No gallop. Pulmonary:      Effort: Pulmonary effort is normal. No tachypnea, accessory muscle usage or respiratory distress. Breath sounds: Normal breath sounds. No decreased breath sounds, wheezing, rhonchi or rales. Abdominal:      Palpations: Abdomen is soft. Tenderness: There is no abdominal tenderness. There is no right CVA tenderness, left CVA tenderness, guarding or rebound. Negative signs include Rovsing's sign and McBurney's sign. Musculoskeletal: Normal range of motion. Skin:     General: Skin is warm and dry. Neurological:      Mental Status: She is alert and oriented to person, place, and time.    Psychiatric:         Judgment: Judgment normal.             Diagnostic Study Results     Labs -     Recent Results (from the past 12 hour(s))   CBC WITH AUTOMATED DIFF    Collection Time: 04/07/20 10:29 AM   Result Value Ref Range    WBC 7.6 4.6 - 13.2 K/uL    RBC 4.58 4.20 - 5.30 M/uL    HGB 13.6 12.0 - 16.0 g/dL    HCT 41.6 35.0 - 45.0 %    MCV 90.8 74.0 - 97.0 FL    MCH 29.7 24.0 - 34.0 PG    MCHC 32.7 31.0 - 37.0 g/dL    RDW 13.5 11.6 - 14.5 %    PLATELET 317 317 - 840 K/uL    MPV 9.4 9.2 - 11.8 FL    NEUTROPHILS 64 40 - 73 %    LYMPHOCYTES 27 21 - 52 %    MONOCYTES 7 3 - 10 %    EOSINOPHILS 2 0 - 5 %    BASOPHILS 0 0 - 2 %    ABS. NEUTROPHILS 4.9 1.8 - 8.0 K/UL    ABS. LYMPHOCYTES 2.0 0.9 - 3.6 K/UL    ABS. MONOCYTES 0.5 0.05 - 1.2 K/UL    ABS. EOSINOPHILS 0.1 0.0 - 0.4 K/UL    ABS. BASOPHILS 0.0 0.0 - 0.1 K/UL    DF AUTOMATED     METABOLIC PANEL, COMPREHENSIVE    Collection Time: 04/07/20 10:29 AM   Result Value Ref Range    Sodium 136 136 - 145 mmol/L    Potassium 4.6 3.5 - 5.5 mmol/L    Chloride 105 100 - 111 mmol/L    CO2 26 21 - 32 mmol/L    Anion gap 5 3.0 - 18 mmol/L    Glucose 98 74 - 99 mg/dL    BUN 18 7.0 - 18 MG/DL    Creatinine 1.33 (H) 0.6 - 1.3 MG/DL    BUN/Creatinine ratio 14 12 - 20      GFR est AA 47 (L) >60 ml/min/1.73m2    GFR est non-AA 39 (L) >60 ml/min/1.73m2    Calcium 9.6 8.5 - 10.1 MG/DL    Bilirubin, total 0.3 0.2 - 1.0 MG/DL    ALT (SGPT) 22 13 - 56 U/L    AST (SGOT) 21 10 - 38 U/L    Alk.  phosphatase 102 45 - 117 U/L    Protein, total 7.2 6.4 - 8.2 g/dL    Albumin 3.1 (L) 3.4 - 5.0 g/dL    Globulin 4.1 (H) 2.0 - 4.0 g/dL    A-G Ratio 0.8 0.8 - 1.7     LIPASE    Collection Time: 04/07/20 10:29 AM   Result Value Ref Range    Lipase 86 73 - 393 U/L   URINALYSIS W/ RFLX MICROSCOPIC    Collection Time: 04/07/20  1:10 PM   Result Value Ref Range    Color YELLOW      Appearance CLEAR      Specific gravity 1.007 1.005 - 1.030      pH (UA) 5.0 5.0 - 8.0      Protein Negative NEG mg/dL    Glucose Negative NEG mg/dL    Ketone Negative NEG mg/dL    Bilirubin Negative NEG      Blood Negative NEG      Urobilinogen 0.2 0.2 - 1.0 EU/dL    Nitrites Negative NEG      Leukocyte Esterase TRACE (A) NEG     URINE MICROSCOPIC ONLY    Collection Time: 04/07/20  1:10 PM   Result Value Ref Range    WBC 2 to 3 0 - 5 /hpf    RBC 0 to 1 0 - 5 /hpf    Epithelial cells 2+ 0 - 5 /lpf    Bacteria 2+ (A) NEG /hpf         XR ABD (KUB)   Final Result   IMPRESSION:      Nonobstructive bowel gas pattern. CT Results  (Last 48 hours)    None        CXR Results  (Last 48 hours)    None          Medications given in the ED-  Medications   sodium chloride 0.9 % bolus infusion 1,000 mL (0 mL IntraVENous IV Completed 4/7/20 1150)   magnesium citrate solution 296 mL (296 mL Oral Given 4/7/20 1028)         Medical Decision Making   I am the first provider for this patient. I reviewed the vital signs, available nursing notes, past medical history, past surgical history, family history and social history. Vital Signs-Reviewed the patient's vital signs. Pulse Oximetry Analysis - 100% on RA     Records Reviewed: Nursing Notes    Provider Notes (Medical Decision Making): constipation on opiates for hip pain. No abdominal pain complaints with benign abdomen. Pt has negative abdominal CT abdomen[facility]pel[facility] ED on [facility]/25/2020 that was negative. Will order KUB, check labs, and give mag citrate    Procedures:  Procedures    ED Course:   9:14 AM Initial assessment performed. The patients presenting problems have been discussed, and they are in agreement with the care plan formulated and outlined with them. I have encouraged them to ask questions as they arise throughout their visit. Diagnosis and Disposition       Feels better after BM. Labs unremarkable. Benign abdomen with no abdominal pain complaints. Discussed high fiber diet. Miralax. PO fluids. Stop or limit opiates. PCP FU. Reasons to RTED discussed with pt. All questions answered. Pt feels comfortable going home at this time. Pt expressed understanding and she agrees with plan. 1. Constipation, unspecified constipation type    2. Opiate use        PLAN:  1. D/C Home  2. Discharge Medication List as of 4/7/2020  1:34 PM      START taking these medications    Details   polyethylene glycol (Miralax) 17 gram/dose powder Take 17 g by mouth daily.  1 tablespoon with 8 oz of water daily Indications: constipation, Print, Disp-289 g, R-0         CONTINUE these medications which have NOT CHANGED    Details   ergocalciferol (VITAMIN D2) 50,000 unit capsule Take 50,000 Units by mouth every seven (7) days. Dr. Adrienne Xie with [facility]  Indications: Vitamin D Deficiency, Historical Med      raNITIdine hcl 300 mg cap Take 1 Cap by mouth daily. Saman barclay with [facility], Historical Med      metoprolol ta-hydrochlorothiaz (LOPRESSOR HCT) 100-50 mg per tablet Take 1 Tab by mouth two (2) times a day. Indications: hypertension, Historical Med      !! nystatin (NYAMYC) powder Apply 1 g to affected area two (2) times a day. Indications: CUTANEOUS CANDIDIASIS, Diaper Rash, Historical Med      !! nystatin (MYCOSTATIN) powder Apply  to affected area two (2) times a day., Normal, Disp-60 g, R-0      hydroCHLOROthiazide (HYDRODIURIL) 25 mg tablet Take 1 Tab by mouth daily. , Normal, Disp-30 Tab, R-0      spironolactone (ALDACTONE) 25 mg tablet Take 1 Tab by mouth daily. , Normal, Disp-30 Tab, R-0      sucralfate (CARAFATE) 1 gram tablet Take 1 Tab by mouth four (4) times daily. , Print, Disp-60 Tab, R-2      lovastatin (MEVACOR) 40 mg tablet Take 40 mg by mouth nightly., Historical Med      cyclobenzaprine (FLEXERIL) 10 mg tablet Take 10 mg by mouth three (3) times daily as needed for Muscle Spasm(s). , Historical Med      aspirin (ASPIRIN) 325 mg tablet Take 325 mg by mouth daily. , Historical Med      famotidine (PEPCID) 20 mg tablet Take 20 mg by mouth two (2) times a day.  , Historical Med       !! - Potential duplicate medications found. Please discuss with provider.         3.   Follow-up Information     Follow up With Specialties Details Why Contact Info    Charlene Mccormack MD Internal Medicine   [address] [address] [phone]      [facility] Emergency Medicine  As needed, If symptoms worsen [address] [id number] [phone] _______________________________    Attestations: This note is prepared by Odalys Casas PA-C.  _______________________________        Please note that this dictation was completed with [employer], the computer voice recognition software. Quite often unanticipated grammatical, syntax, homophones, and other interpretive errors are inadvertently transcribed by the computer software. Please disregard these errors. Please excuse any errors that have escaped final proofreading.

## 2020-04-07 NOTE — ED TRIAGE NOTES
Attempted to triage pt upon arrival with EMS, pt refusing as she wishes to use bathroom, will reattempt once pt returns from restroom.

## 2020-04-08 LAB
BACTERIA SPEC CULT: NORMAL
CC UR VC: NORMAL
SERVICE CMNT-IMP: NORMAL

## 2021-06-29 ENCOUNTER — HOSPITAL ENCOUNTER (EMERGENCY)
Age: 76
Discharge: HOME | End: 2021-06-29
Attending: EMERGENCY MEDICINE
Payer: MEDICARE

## 2021-06-29 ENCOUNTER — APPOINTMENT (OUTPATIENT)
Dept: CT IMAGING | Age: 76
End: 2021-06-29
Attending: EMERGENCY MEDICINE
Payer: MEDICARE

## 2021-06-29 VITALS
OXYGEN SATURATION: 96 % | SYSTOLIC BLOOD PRESSURE: 143 MMHG | WEIGHT: 140 LBS | HEART RATE: 59 BPM | RESPIRATION RATE: 11 BRPM | HEIGHT: 59 IN | DIASTOLIC BLOOD PRESSURE: 86 MMHG | BODY MASS INDEX: 28.22 KG/M2 | TEMPERATURE: 97.8 F

## 2021-06-29 DIAGNOSIS — R19.7 NAUSEA VOMITING AND DIARRHEA: ICD-10-CM

## 2021-06-29 DIAGNOSIS — R10.31 ABDOMINAL PAIN, RIGHT LOWER QUADRANT: Primary | ICD-10-CM

## 2021-06-29 DIAGNOSIS — R11.2 NAUSEA VOMITING AND DIARRHEA: ICD-10-CM

## 2021-06-29 DIAGNOSIS — E86.0 DEHYDRATION: ICD-10-CM

## 2021-06-29 LAB
ABO + RH BLD: NORMAL
ALBUMIN SERPL-MCNC: 3.7 G/DL (ref 3.4–5)
ALBUMIN/GLOB SERPL: 0.9 {RATIO} (ref 0.8–1.7)
ALP SERPL-CCNC: 82 U/L (ref 45–117)
ALT SERPL-CCNC: 19 U/L (ref 13–56)
ANION GAP SERPL CALC-SCNC: 9 MMOL/L (ref 3–18)
APPEARANCE UR: CLEAR
APTT PPP: 25.1 SEC (ref 23–36.4)
AST SERPL-CCNC: 24 U/L (ref 10–38)
BACTERIA URNS QL MICRO: NORMAL /HPF
BASOPHILS # BLD: 0 K/UL (ref 0–0.1)
BASOPHILS NFR BLD: 0 % (ref 0–2)
BILIRUB SERPL-MCNC: 0.6 MG/DL (ref 0.2–1)
BILIRUB UR QL: NEGATIVE
BLOOD GROUP ANTIBODIES SERPL: NORMAL
BUN SERPL-MCNC: 18 MG/DL (ref 7–18)
BUN/CREAT SERPL: 12 (ref 12–20)
CALCIUM SERPL-MCNC: 9.9 MG/DL (ref 8.5–10.1)
CHLORIDE SERPL-SCNC: 105 MMOL/L (ref 100–111)
CK MB CFR SERPL CALC: 1.8 % (ref 0–4)
CK MB SERPL-MCNC: 5.9 NG/ML (ref 5–25)
CK SERPL-CCNC: 334 U/L (ref 26–192)
CO2 SERPL-SCNC: 25 MMOL/L (ref 21–32)
COLOR UR: YELLOW
CREAT SERPL-MCNC: 1.48 MG/DL (ref 0.6–1.3)
DIFFERENTIAL METHOD BLD: ABNORMAL
EOSINOPHIL # BLD: 0.1 K/UL (ref 0–0.4)
EOSINOPHIL NFR BLD: 1 % (ref 0–5)
EPITH CASTS URNS QL MICRO: NORMAL /LPF (ref 0–5)
ERYTHROCYTE [DISTWIDTH] IN BLOOD BY AUTOMATED COUNT: 13.6 % (ref 11.6–14.5)
GLOBULIN SER CALC-MCNC: 4.3 G/DL (ref 2–4)
GLUCOSE SERPL-MCNC: 104 MG/DL (ref 74–99)
GLUCOSE UR STRIP.AUTO-MCNC: NEGATIVE MG/DL
HCT VFR BLD AUTO: 46.1 % (ref 35–45)
HGB BLD-MCNC: 14.9 G/DL (ref 12–16)
HGB UR QL STRIP: NEGATIVE
HYALINE CASTS URNS QL MICRO: NORMAL /LPF (ref 0–2)
INR PPP: 1.1 (ref 0.8–1.2)
KETONES UR QL STRIP.AUTO: NEGATIVE MG/DL
LEUKOCYTE ESTERASE UR QL STRIP.AUTO: ABNORMAL
LIPASE SERPL-CCNC: 117 U/L (ref 73–393)
LYMPHOCYTES # BLD: 2.3 K/UL (ref 0.9–3.6)
LYMPHOCYTES NFR BLD: 25 % (ref 21–52)
MCH RBC QN AUTO: 29.4 PG (ref 24–34)
MCHC RBC AUTO-ENTMCNC: 32.3 G/DL (ref 31–37)
MCV RBC AUTO: 91.1 FL (ref 74–97)
MONOCYTES # BLD: 0.4 K/UL (ref 0.05–1.2)
MONOCYTES NFR BLD: 5 % (ref 3–10)
NEUTS SEG # BLD: 6.5 K/UL (ref 1.8–8)
NEUTS SEG NFR BLD: 69 % (ref 40–73)
NITRITE UR QL STRIP.AUTO: NEGATIVE
PH UR STRIP: 5.5 [PH] (ref 5–8)
PLATELET # BLD AUTO: 274 K/UL (ref 135–420)
PMV BLD AUTO: 9.4 FL (ref 9.2–11.8)
POTASSIUM SERPL-SCNC: 4.2 MMOL/L (ref 3.5–5.5)
PROT SERPL-MCNC: 8 G/DL (ref 6.4–8.2)
PROT UR STRIP-MCNC: NEGATIVE MG/DL
PROTHROMBIN TIME: 13.6 SEC (ref 11.5–15.2)
RBC # BLD AUTO: 5.06 M/UL (ref 4.2–5.3)
RBC #/AREA URNS HPF: NEGATIVE /HPF (ref 0–5)
SODIUM SERPL-SCNC: 139 MMOL/L (ref 136–145)
SP GR UR REFRACTOMETRY: >1.03 (ref 1–1.03)
SPECIMEN EXP DATE BLD: NORMAL
TROPONIN I SERPL-MCNC: <0.02 NG/ML (ref 0–0.04)
UROBILINOGEN UR QL STRIP.AUTO: 0.2 EU/DL (ref 0.2–1)
WBC # BLD AUTO: 9.4 K/UL (ref 4.6–13.2)
WBC URNS QL MICRO: NORMAL /HPF (ref 0–5)

## 2021-06-29 PROCEDURE — 85025 COMPLETE CBC W/AUTO DIFF WBC: CPT

## 2021-06-29 PROCEDURE — 83690 ASSAY OF LIPASE: CPT

## 2021-06-29 PROCEDURE — 85610 PROTHROMBIN TIME: CPT

## 2021-06-29 PROCEDURE — 96375 TX/PRO/DX INJ NEW DRUG ADDON: CPT

## 2021-06-29 PROCEDURE — 74011000636 HC RX REV CODE- 636: Performed by: EMERGENCY MEDICINE

## 2021-06-29 PROCEDURE — 85730 THROMBOPLASTIN TIME PARTIAL: CPT

## 2021-06-29 PROCEDURE — 81001 URINALYSIS AUTO W/SCOPE: CPT

## 2021-06-29 PROCEDURE — 74011250636 HC RX REV CODE- 250/636: Performed by: EMERGENCY MEDICINE

## 2021-06-29 PROCEDURE — 96374 THER/PROPH/DIAG INJ IV PUSH: CPT

## 2021-06-29 PROCEDURE — 82550 ASSAY OF CK (CPK): CPT

## 2021-06-29 PROCEDURE — 86901 BLOOD TYPING SEROLOGIC RH(D): CPT

## 2021-06-29 PROCEDURE — 74177 CT ABD & PELVIS W/CONTRAST: CPT

## 2021-06-29 PROCEDURE — 93005 ELECTROCARDIOGRAM TRACING: CPT

## 2021-06-29 PROCEDURE — 99285 EMERGENCY DEPT VISIT HI MDM: CPT

## 2021-06-29 PROCEDURE — 96361 HYDRATE IV INFUSION ADD-ON: CPT

## 2021-06-29 PROCEDURE — 80053 COMPREHEN METABOLIC PANEL: CPT

## 2021-06-29 RX ORDER — ONDANSETRON 4 MG/1
4 TABLET, ORALLY DISINTEGRATING ORAL
Qty: 12 TABLET | Refills: 0 | Status: SHIPPED | OUTPATIENT
Start: 2021-06-29 | End: 2021-07-07

## 2021-06-29 RX ORDER — MORPHINE SULFATE 4 MG/ML
4 INJECTION INTRAVENOUS
Status: COMPLETED | OUTPATIENT
Start: 2021-06-29 | End: 2021-06-29

## 2021-06-29 RX ORDER — ONDANSETRON 2 MG/ML
4 INJECTION INTRAMUSCULAR; INTRAVENOUS
Status: COMPLETED | OUTPATIENT
Start: 2021-06-29 | End: 2021-06-29

## 2021-06-29 RX ADMIN — MORPHINE SULFATE 4 MG: 4 INJECTION INTRAVENOUS at 15:40

## 2021-06-29 RX ADMIN — IOPAMIDOL 100 ML: 612 INJECTION, SOLUTION INTRAVENOUS at 16:30

## 2021-06-29 RX ADMIN — ONDANSETRON 4 MG: 2 INJECTION INTRAMUSCULAR; INTRAVENOUS at 15:40

## 2021-06-29 RX ADMIN — SODIUM CHLORIDE 1000 ML: 900 INJECTION, SOLUTION INTRAVENOUS at 15:40

## 2021-06-29 NOTE — ED PROVIDER NOTES
EMERGENCY DEPARTMENT HISTORY AND PHYSICAL EXAM    Date: 6/29/2021  Patient Name: Kati Hooper    History of Presenting Illness     Chief Complaint   Patient presents with    Abdominal Pain         History Provided By: Patient and EMS    3:20 PM  Kati Hooper is a 78 y.o. female with PMHX of hypertension, hyperlipidemia, prior lower extremity blood clots on a blood thinner but she does not recall what kind who presents to the emergency department C/O abdominal pain with nausea, vomiting, diarrhea. Per patient symptoms started today after eating breakfast.  She reports the pain is in the right side of her abdomen and severe and worse when she is moving and better when she rests. She denies any urinary complaints, fever, cough, chest pain, shortness of breath. No known sick contacts or recent travel. Reports she had an abdominal surgery before to remove some sort of tumor but does not recall the details. PCP: Latoya Marino MD    Current Outpatient Medications   Medication Sig Dispense Refill    ondansetron (ZOFRAN ODT) 4 mg disintegrating tablet Take 1 Tablet by mouth every eight (8) hours as needed for Nausea for up to 12 doses. 12 Tablet 0    ergocalciferol (VITAMIN D2) 50,000 unit capsule Take 50,000 Units by mouth every seven (7) days. Dr. Latoya Marino with [facility]  Indications: Vitamin D Deficiency      raNITIdine hcl 300 mg cap Take 1 Cap by mouth daily. Saman barclay with [facility] metoprolol ta-hydrochlorothiaz (LOPRESSOR HCT) 100-50 mg per tablet Take 1 Tab by mouth two (2) times a day. Indications: hypertension      nystatin (NYAMYC) powder Apply 1 g to affected area two (2) times a day. Indications: CUTANEOUS CANDIDIASIS, Diaper Rash      nystatin (MYCOSTATIN) powder Apply  to affected area two (2) times a day. 60 g 0    hydroCHLOROthiazide (HYDRODIURIL) 25 mg tablet Take 1 Tab by mouth daily. 30 Tab 0    spironolactone (ALDACTONE) 25 mg tablet Take 1 Tab by mouth daily. 30 Tab 0    sucralfate (CARAFATE) 1 gram tablet Take 1 Tab by mouth four (4) times daily. (Patient not taking: Reported on 2/8/2018) 60 Tab 2    lovastatin (MEVACOR) 40 mg tablet Take 40 mg by mouth nightly.  cyclobenzaprine (FLEXERIL) 10 mg tablet Take 10 mg by mouth three (3) times daily as needed for Muscle Spasm(s).  aspirin (ASPIRIN) 325 mg tablet Take 325 mg by mouth daily.  famotidine (PEPCID) 20 mg tablet Take 20 mg by mouth two (2) times a day. Past History     Past Medical History:  Past Medical History:   Diagnosis Date    Arthritis     Blood clot in vein 2004    left leg     [facility] [facility]     Cancer ([facility] [address]     right breast    GERD (gastroesophageal reflux disease)     High cholesterol     Hypertension     Other ill-defined conditions(799.89)     high cholestrol    Vertigo        Past Surgical History:  Past Surgical History:   Procedure Laterality Date    HX BREAST LUMPECTOMY      HX GI      colonosocpy    HX KNEE REPLACEMENT      HX ORTHOPAEDIC      total knee replacement bilateral    HX ORTHOPAEDIC      excision of cyst left hand    HX TUBAL LIGATION      HX TUMOR REMOVAL      GA ABDOMEN SURGERY PROC UNLISTED      removal of tumor abdomen       Family History:  History reviewed. No pertinent family history. Social History:  Social History     Tobacco Use    Smoking status: Passive Smoke Exposure - Never Smoker    Smokeless tobacco: Never Used   Substance Use Topics    Alcohol use: No    Drug use: No       Allergies: Allergies   Allergen Reactions    Amiloride Nausea and Vomiting    Amoxicillin Rash    Bactrim [Sulfamethoprim Ds] Unknown (comments)    Gabapentin Rash    Ibuprofen Unknown (comments)    Zestoretic [Lisinopril-Hydrochlorothiazide] Unknown (comments)         Review of Systems   Review of Systems   Constitutional: Negative for fever. Respiratory: Negative for shortness of breath. Cardiovascular: Negative for chest pain. Gastrointestinal: Positive for abdominal pain, diarrhea, nausea and vomiting. All other systems reviewed and are negative. Physical Exam     Vitals:    06/29/21 1520 06/29/21 1541   BP: (!) 143/86    Pulse: (!) 59    Resp: 11    Temp: 97.8 °F (36.6 °C)    SpO2: 100% 96%   Weight: 63.5 kg (140 lb)    Height: 4' 11\" (1.499 m)      Physical Exam    Nursing notes and vital signs reviewed    Constitutional: Non toxic appearing, moderate distress  Head: Normocephalic, Atraumatic  Eyes: EOMI  Neck: Supple  Cardiovascular: Regular rate and rhythm, no murmurs, rubs, or gallops  Chest: Normal work of breathing and chest excursion bilaterally  Lungs: Clear to ausculation bilaterally  Abdomen: Soft, exquisitely tender with guarding diffusely, non distended  Back: No evidence of trauma or deformity  Extremities: No evidence of trauma or deformity  Skin: Warm and dry, normal cap refill  Neuro: Alert and appropriate  Psychiatric: Normal mood and affect      Diagnostic Study Results     Labs -     Recent Results (from the past 12 hour(s))   CBC WITH AUTOMATED DIFF    Collection Time: 06/29/21  3:25 PM   Result Value Ref Range    WBC 9.4 4.6 - 13.2 K/uL    RBC 5.06 4.20 - 5.30 M/uL    HGB 14.9 12.0 - 16.0 g/dL    HCT 46.1 (H) 35.0 - 45.0 %    MCV 91.1 74.0 - 97.0 FL    MCH 29.4 24.0 - 34.0 PG    MCHC 32.3 31.0 - 37.0 g/dL    RDW 13.6 11.6 - 14.5 %    PLATELET 193 921 - 635 K/uL    MPV 9.4 9.2 - 11.8 FL    NEUTROPHILS 69 40 - 73 %    LYMPHOCYTES 25 21 - 52 %    MONOCYTES 5 3 - 10 %    EOSINOPHILS 1 0 - 5 %    BASOPHILS 0 0 - 2 %    ABS. NEUTROPHILS 6.5 1.8 - 8.0 K/UL    ABS. LYMPHOCYTES 2.3 0.9 - 3.6 K/UL    ABS. MONOCYTES 0.4 0.05 - 1.2 K/UL    ABS. EOSINOPHILS 0.1 0.0 - 0.4 K/UL    ABS.  BASOPHILS 0.0 0.0 - 0.1 K/UL    DF AUTOMATED     METABOLIC PANEL, COMPREHENSIVE    Collection Time: 06/29/21  3:25 PM   Result Value Ref Range    Sodium 139 136 - 145 mmol/L    Potassium 4.2 3.5 - 5.5 mmol/L    Chloride 105 100 - 111 mmol/L    CO2 25 21 - 32 mmol/L    Anion gap 9 3.0 - 18 mmol/L    Glucose 104 (H) 74 - 99 mg/dL    BUN 18 7.0 - 18 MG/DL    Creatinine 1.48 (H) 0.6 - 1.3 MG/DL    BUN/Creatinine ratio 12 12 - 20      GFR est AA 42 (L) >60 ml/min/1.73m2    GFR est non-AA 34 (L) >60 ml/min/1.73m2    Calcium 9.9 8.5 - 10.1 MG/DL    Bilirubin, total 0.6 0.2 - 1.0 MG/DL    ALT (SGPT) 19 13 - 56 U/L    AST (SGOT) 24 10 - 38 U/L    Alk. phosphatase 82 45 - 117 U/L    Protein, total 8.0 6.4 - 8.2 g/dL    Albumin 3.7 3.4 - 5.0 g/dL    Globulin 4.3 (H) 2.0 - 4.0 g/dL    A-G Ratio 0.9 0.8 - 1.7     LIPASE    Collection Time: 06/29/21  3:25 PM   Result Value Ref Range    Lipase 117 73 - 393 U/L   PROTHROMBIN TIME + INR    Collection Time: 06/29/21  3:25 PM   Result Value Ref Range    Prothrombin time 13.6 11.5 - 15.2 sec    INR 1.1 0.8 - 1.2     PTT    Collection Time: 06/29/21  3:25 PM   Result Value Ref Range    aPTT 25.1 23.0 - 36.4 SEC   CARDIAC PANEL,(CK, CKMB & TROPONIN)    Collection Time: 06/29/21  3:25 PM   Result Value Ref Range    CK - MB 5.9 (H) <3.6 ng/ml    CK-MB Index 1.8 0.0 - 4.0 %     (H) 26 - 192 U/L    Troponin-I, QT <0.02 0.0 - 0.045 NG/ML   TYPE & SCREEN    Collection Time: 06/29/21  3:39 PM   Result Value Ref Range    Crossmatch Expiration 07/02/2021,2359     ABO/Rh(D) A POSITIVE     Antibody screen NEG    EKG, 12 LEAD, INITIAL    Collection Time: 06/29/21  4:06 PM   Result Value Ref Range    Ventricular Rate 56 BPM    Atrial Rate 56 BPM    P-R Interval 180 ms    QRS Duration 66 ms    Q-T Interval 438 ms    QTC Calculation (Bezet) 422 ms    Calculated P Axis 56 degrees    Calculated R Axis -3 degrees    Calculated T Axis -2 degrees    Diagnosis       Sinus bradycardia  Septal infarct , age undetermined  Abnormal ECG  When compared with ECG of 15-MARIAELENA-2018 18:28,  Vent.  rate has decreased BY  86 BPM  T wave inversion now evident in Inferior leads     URINALYSIS W/ RFLX MICROSCOPIC    Collection Time: 06/29/21  5:20 PM Result Value Ref Range    Color YELLOW      Appearance CLEAR      Specific gravity >1.030 (H) 1.005 - 1.030    pH (UA) 5.5 5.0 - 8.0      Protein Negative NEG mg/dL    Glucose Negative NEG mg/dL    Ketone Negative NEG mg/dL    Bilirubin Negative NEG      Blood Negative NEG      Urobilinogen 0.2 0.2 - 1.0 EU/dL    Nitrites Negative NEG      Leukocyte Esterase TRACE (A) NEG     URINE MICROSCOPIC ONLY    Collection Time: 06/29/21  5:20 PM   Result Value Ref Range    WBC 1 to 3 0 - 5 /hpf    RBC Negative 0 - 5 /hpf    Epithelial cells FEW 0 - 5 /lpf    Bacteria RARE NEG /hpf    Hyaline cast 0 to 1 0 - 2 /lpf       Radiologic Studies -   CT ABD PELV W CONT   Final Result      1. No focal bowel wall thickening or inflammatory change. No morphology of   bowel obstruction. Normal appendix. 2. Large hiatus hernia. 3. Mild left basilar atelectasis with otherwise clear lung bases. 4. Grade 2 anterolisthesis L4 on L5 with advanced lower lumbar predominant   spondylosis and facet joint osteoarthritis. 5. Advanced, end stage osteoarthritis of the left hip joint        CT Results  (Last 48 hours)               06/29/21 1644  CT ABD PELV W CONT Final result    Impression:      1. No focal bowel wall thickening or inflammatory change. No morphology of   bowel obstruction. Normal appendix. 2. Large hiatus hernia. 3. Mild left basilar atelectasis with otherwise clear lung bases. 4. Grade 2 anterolisthesis L4 on L5 with advanced lower lumbar predominant   spondylosis and facet joint osteoarthritis. 5. Advanced, end stage osteoarthritis of the left hip joint       Narrative:  EXAM: CT of the abdomen and pelvis       INDICATION: 44-year-old patient with abdominal pain       COMPARISON: CT performed 1/18/2018       TECHNIQUE: Axial CT imaging of the abdomen and pelvis was performed with   intravenous contrast. Multiplanar reformats were generated.        One or more dose reduction techniques were used on this CT: automated exposure   control, adjustment of the mAs and/or kVp according to patient size, and   iterative reconstruction techniques. The specific techniques used on this CT   exam have been documented in the patient's electronic medical record. Digital   Imaging and Communications in Medicine (DICOM) format image data are available   to nonaffiliated external healthcare facilities or entities on a secure, media   free, reciprocally searchable basis with patient authorization for at least a   12-month period after this study. _______________       FINDINGS:       LOWER CHEST: Mild basilar changes of atelectasis without alveolar consolidation   or pleural effusion. Normal cardiac size. No pericardial effusion. LIVER, BILIARY: Hepatic parenchymal enhancement is uniform. There are scattered   hepatic hypodensities redemonstrated which are either too small accurately   characterize or are in keeping with simple cysts. No biliary dilation. Gallbladder is unremarkable. PANCREAS: Normal.       SPLEEN: Normal.       ADRENALS: Normal.       KIDNEYS/URETERS/BLADDER: Symmetric renal enhancement. No hydronephrosis. Multiple bilateral renal hypodensities, some too small to accurately   characterize, with others representing simple cysts which require no further   dedicated imaging follow-up. Urinary bladder is unremarkable. PELVIC ORGANS: Several calcified and noncalcified uterine leiomyomata. Adnexa   appear within normal limits. VASCULATURE: Diffuse atherosclerosis. No evidence of aneurysmal dilatation. LYMPH NODES: No enlarged lymph nodes. GASTROINTESTINAL TRACT: There is a large hiatus hernia. No evidence of   perigastric inflammatory change or stranding. Gastroduodenal junction and is   below the level of the diaphragm. Evidence of prior colonic resection and   reanastomosis. No morphology of bowel obstruction. No free intraperitoneal gas. Normal appendix.        BONES: Moderate anterolisthesis of L4 on L5. Multilevel lower thoracic and   lumbar spondylosis as well as lower lumbar predominant facet joint   osteoarthritis, as previously. 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 OTHER: None.       _______________               CXR Results  (Last 48 hours)    None          Medications given in the ED-  Medications   sodium chloride 0.9 % bolus infusion 1,000 mL (1,000 mL IntraVENous New Bag 6/29/21 1540)   ondansetron (ZOFRAN) injection 4 mg (4 mg IntraVENous Given 6/29/21 1540)   morphine injection 4 mg (4 mg IntraVENous Given 6/29/21 1540)   iopamidoL (ISOVUE 300) 61 % contrast injection 100 mL (100 mL IntraVENous Given 6/29/21 1630)         Medical Decision Making   I am the first provider for this patient. I reviewed the vital signs, available nursing notes, past medical history, past surgical history, family history and social history. Vital Signs-Reviewed the patient's vital signs. Pulse Oximetry Analysis - 96% on room air, not hypoxic     Cardiac Monitor:  Rate: 62 bpm  Rhythm: Normal sinus    EKG interpretation: (Preliminary)  EKG read by Dr. Scout Vela at 4:13 PM  Sinus bradycardia at a rate of 56 bpm, [place] interval of 180 ms, QRS duration 66 ms similar when compared to prior from January 2018 with some increased ST flattening    Records Reviewed: Nursing Notes    Provider Notes (Medical Decision Making): Nilam Hightower is a 78 y.o. female presenting with abdominal pain, nausea, vomiting, diarrhea. Other than mild dehydration labs and imaging are benign. Patient is hemodynamically stable. Patient was recently started on laxative for constipation and this may be contributing to patient's symptoms. Encouraged her to stop the laxatives for now and will add nausea management.  Encourage p.o. hydration and early primary care follow-up with strict return precautions. Patient understands and agrees with this plan. Procedures:  Procedures    ED Course:   6:03 PM  Updated patient on all results and plan. All questions answered. Diagnosis and Disposition     Critical Care: None    DISCHARGE NOTE:    Pili Alvarado  results have been reviewed with her. She has been counseled regarding her diagnosis, treatment, and plan. She verbally conveys understanding and agreement of the signs, symptoms, diagnosis, treatment and prognosis and additionally agrees to follow up as discussed. She also agrees with the care-plan and conveys that all of her questions have been answered. I have also provided discharge instructions for her that include: educational information regarding their diagnosis and treatment, and list of reasons why they would want to return to the ED prior to their follow-up appointment, should her condition change. She has been provided with education for proper emergency department utilization. CLINICAL IMPRESSION:    1. Abdominal pain, right lower quadrant    2. Nausea vomiting and diarrhea    3. Dehydration        PLAN:  1. D/C Home  2. Current Discharge Medication List      START taking these medications    Details   ondansetron (ZOFRAN ODT) 4 mg disintegrating tablet Take 1 Tablet by mouth every eight (8) hours as needed for Nausea for up to 12 doses. Qty: 12 Tablet, Refills: 0  Start date: 6/29/2021           3.    Follow-up Information     Follow up With Specialties Details Why Contact Info    Alberta Barriso MD Internal Medicine Schedule an appointment as soon as possible for a visit   [address] [facility] [address]  [phone]      [facility] EMERGENCY DEPT Emergency Medicine  If symptoms worsen [address] [place] [id number]  [phone]        _______________________________      Please note that this dictation was completed with [employer], the computer voice recognition software. Quite often unanticipated grammatical, syntax, homophones, and other interpretive errors are inadvertently transcribed by the computer software. Please disregard these errors. Please excuse any errors that have escaped final proofreading.

## 2021-06-30 ENCOUNTER — HOSPITAL ENCOUNTER (EMERGENCY)
Age: 76
Discharge: HOME | End: 2021-06-30
Attending: EMERGENCY MEDICINE
Payer: MEDICARE

## 2021-06-30 VITALS
RESPIRATION RATE: 26 BRPM | SYSTOLIC BLOOD PRESSURE: 161 MMHG | HEART RATE: 62 BPM | DIASTOLIC BLOOD PRESSURE: 86 MMHG | OXYGEN SATURATION: 99 % | TEMPERATURE: 98.2 F

## 2021-06-30 DIAGNOSIS — R10.31 ABDOMINAL PAIN, RIGHT LOWER QUADRANT: Primary | ICD-10-CM

## 2021-06-30 DIAGNOSIS — R74.8 ELEVATED LIPASE: ICD-10-CM

## 2021-06-30 LAB
ALBUMIN SERPL-MCNC: 3.4 G/DL (ref 3.4–5)
ALBUMIN/GLOB SERPL: 0.8 {RATIO} (ref 0.8–1.7)
ALP SERPL-CCNC: 79 U/L (ref 45–117)
ALT SERPL-CCNC: 18 U/L (ref 13–56)
ANION GAP SERPL CALC-SCNC: 6 MMOL/L (ref 3–18)
AST SERPL-CCNC: 38 U/L (ref 10–38)
ATRIAL RATE: 56 BPM
BASOPHILS # BLD: 0 K/UL (ref 0–0.1)
BASOPHILS NFR BLD: 0 % (ref 0–2)
BILIRUB SERPL-MCNC: 0.7 MG/DL (ref 0.2–1)
BUN SERPL-MCNC: 17 MG/DL (ref 7–18)
BUN/CREAT SERPL: 14 (ref 12–20)
CALCIUM SERPL-MCNC: 9.3 MG/DL (ref 8.5–10.1)
CALCULATED P AXIS, ECG09: 56 DEGREES
CALCULATED R AXIS, ECG10: -3 DEGREES
CALCULATED T AXIS, ECG11: -2 DEGREES
CHLORIDE SERPL-SCNC: 102 MMOL/L (ref 100–111)
CO2 SERPL-SCNC: 27 MMOL/L (ref 21–32)
CREAT SERPL-MCNC: 1.25 MG/DL (ref 0.6–1.3)
DIAGNOSIS, 93000: NORMAL
DIFFERENTIAL METHOD BLD: NORMAL
EOSINOPHIL # BLD: 0.1 K/UL (ref 0–0.4)
EOSINOPHIL NFR BLD: 2 % (ref 0–5)
ERYTHROCYTE [DISTWIDTH] IN BLOOD BY AUTOMATED COUNT: 13.7 % (ref 11.6–14.5)
GLOBULIN SER CALC-MCNC: 4.1 G/DL (ref 2–4)
GLUCOSE SERPL-MCNC: 84 MG/DL (ref 74–99)
HCT VFR BLD AUTO: 43.2 % (ref 35–45)
HGB BLD-MCNC: 14 G/DL (ref 12–16)
LACTATE BLD-SCNC: 1.62 MMOL/L (ref 0.4–2)
LIPASE SERPL-CCNC: 645 U/L (ref 73–393)
LYMPHOCYTES # BLD: 2.5 K/UL (ref 0.9–3.6)
LYMPHOCYTES NFR BLD: 32 % (ref 21–52)
MCH RBC QN AUTO: 29.5 PG (ref 24–34)
MCHC RBC AUTO-ENTMCNC: 32.4 G/DL (ref 31–37)
MCV RBC AUTO: 90.9 FL (ref 74–97)
MONOCYTES # BLD: 0.5 K/UL (ref 0.05–1.2)
MONOCYTES NFR BLD: 6 % (ref 3–10)
NEUTS SEG # BLD: 4.6 K/UL (ref 1.8–8)
NEUTS SEG NFR BLD: 59 % (ref 40–73)
P-R INTERVAL, ECG05: 180 MS
PLATELET # BLD AUTO: 258 K/UL (ref 135–420)
PMV BLD AUTO: 9.5 FL (ref 9.2–11.8)
POTASSIUM SERPL-SCNC: 4.8 MMOL/L (ref 3.5–5.5)
PROT SERPL-MCNC: 7.5 G/DL (ref 6.4–8.2)
Q-T INTERVAL, ECG07: 438 MS
QRS DURATION, ECG06: 66 MS
QTC CALCULATION (BEZET), ECG08: 422 MS
RBC # BLD AUTO: 4.75 M/UL (ref 4.2–5.3)
SODIUM SERPL-SCNC: 135 MMOL/L (ref 136–145)
VENTRICULAR RATE, ECG03: 56 BPM
WBC # BLD AUTO: 7.8 K/UL (ref 4.6–13.2)

## 2021-06-30 PROCEDURE — 99285 EMERGENCY DEPT VISIT HI MDM: CPT

## 2021-06-30 PROCEDURE — 83690 ASSAY OF LIPASE: CPT

## 2021-06-30 PROCEDURE — 83605 ASSAY OF LACTIC ACID: CPT

## 2021-06-30 PROCEDURE — 74011250636 HC RX REV CODE- 250/636: Performed by: PHYSICIAN ASSISTANT

## 2021-06-30 PROCEDURE — 80053 COMPREHEN METABOLIC PANEL: CPT

## 2021-06-30 PROCEDURE — 74011250637 HC RX REV CODE- 250/637: Performed by: PHYSICIAN ASSISTANT

## 2021-06-30 PROCEDURE — 85025 COMPLETE CBC W/AUTO DIFF WBC: CPT

## 2021-06-30 RX ORDER — ACETAMINOPHEN 500 MG
1000 TABLET ORAL
Status: COMPLETED | OUTPATIENT
Start: 2021-06-30 | End: 2021-06-30

## 2021-06-30 RX ADMIN — SODIUM CHLORIDE 1000 ML: 900 INJECTION, SOLUTION INTRAVENOUS at 18:07

## 2021-06-30 RX ADMIN — ACETAMINOPHEN 1000 MG: 500 TABLET ORAL at 18:00

## 2021-06-30 NOTE — ED NOTES
I have reviewed discharge instructions with the patient and caregiver. The patient and caregiver verbalized understanding. Patient armband removed and shredded.   This RN had a very length conversation with family regarding patient's condition to include follow up with GI, diet and pain control

## 2021-06-30 NOTE — DISCHARGE INSTRUCTIONS
Please see your doctor in 12 to 24 hours for recheck. Please return for increasing pain, weakness, fevers, chills or any other concerning symptoms.   Your lipase was mildly elevated, it is very important that you have your doctor repeat check this laboratory tests

## 2021-06-30 NOTE — ED NOTES
Ce Castro RN  reviewed discharge instructions with the patient. The patient verbalized understanding.  Iv discontinued pt left ED in stable condition in wheel chair in no distress

## 2021-06-30 NOTE — ED PROVIDER NOTES
EMERGENCY DEPARTMENT HISTORY AND PHYSICAL EXAM    Date: 6/30/2021  Patient Name: Ekaterina Bolanos    History of Presenting Illness     Chief Complaint   Patient presents with    Abdominal Pain         History Provided By: Patient    Chief Complaint: abd pain       Additional History (Context):   4:24 PM  Ekaterina Bolanos is a 78 y.o. female with PMHX lumpectomy, breast cancer, GERD, DVT, hypertension, high cholesterol, vertigo presents to the emergency department C/O right lower abdominal pain that is been going on for some time. Patient was seen here yesterday for same complaint. She had lab work and a CT of her abdomen that showed no acute process. Today she states that she had some cramping in her lower right abdomen and then had an episode of diarrhea. At that time she felt very hot and \"felt steam going up through her body\". States she tried to drink some water to cool herself down but it did not work. States she is not having any nausea or vomiting today. Denies any recent antibiotic use. States she had a \"tumor removed from her abdomen\". States she has been having some difficulty with urinating. No fevers. Denies any black tarry stool. No chest pain or shortness of breath    PCP: Octavia Agrawal MD    Current Outpatient Medications   Medication Sig Dispense Refill    ondansetron (ZOFRAN ODT) 4 mg disintegrating tablet Take 1 Tablet by mouth every eight (8) hours as needed for Nausea for up to 12 doses. 12 Tablet 0    ergocalciferol (VITAMIN D2) 50,000 unit capsule Take 50,000 Units by mouth every seven (7) days. Dr. Octavia Agrawal with [facility]  Indications: Vitamin D Deficiency      raNITIdine hcl 300 mg cap Take 1 Cap by mouth daily. Saman barclay with Jay[facility] Delgado metoprolol ta-hydrochlorothiaz (LOPRESSOR HCT) 100-50 mg per tablet Take 1 Tab by mouth two (2) times a day. Indications: hypertension      nystatin (NYAMYC) powder Apply 1 g to affected area two (2) times a day. Indications: CUTANEOUS CANDIDIASIS, Diaper Rash      nystatin (MYCOSTATIN) powder Apply  to affected area two (2) times a day. 60 g 0    hydroCHLOROthiazide (HYDRODIURIL) 25 mg tablet Take 1 Tab by mouth daily. 30 Tab 0    spironolactone (ALDACTONE) 25 mg tablet Take 1 Tab by mouth daily. 30 Tab 0    sucralfate (CARAFATE) 1 gram tablet Take 1 Tab by mouth four (4) times daily. (Patient not taking: Reported on 2/8/2018) 60 Tab 2    lovastatin (MEVACOR) 40 mg tablet Take 40 mg by mouth nightly.  cyclobenzaprine (FLEXERIL) 10 mg tablet Take 10 mg by mouth three (3) times daily as needed for Muscle Spasm(s).  aspirin (ASPIRIN) 325 mg tablet Take 325 mg by mouth daily.  famotidine (PEPCID) 20 mg tablet Take 20 mg by mouth two (2) times a day. Past History     Past Medical History:  Past Medical History:   Diagnosis Date    Arthritis     Blood clot in vein 2004    left leg     [facility] [facility]     Cancer ([facility] [facility]     right breast    GERD (gastroesophageal reflux disease)     High cholesterol     Hypertension     Other ill-defined conditions(799.89)     high cholestrol    Vertigo        Past Surgical History:  Past Surgical History:   Procedure Laterality Date    HX BREAST LUMPECTOMY      HX GI      colonosocpy    HX KNEE REPLACEMENT      HX ORTHOPAEDIC      total knee replacement bilateral    HX ORTHOPAEDIC      excision of cyst left hand    HX TUBAL LIGATION      HX TUMOR REMOVAL      [place] ABDOMEN SURGERY PROC UNLISTED      removal of tumor abdomen       Family History:  History reviewed. No pertinent family history. Social History:  Social History     Tobacco Use    Smoking status: Passive Smoke Exposure - Never Smoker    Smokeless tobacco: Never Used   Substance Use Topics    Alcohol use: No    Drug use: No       Allergies:   Allergies   Allergen Reactions    Amiloride Nausea and Vomiting    Amoxicillin Rash    Bactrim [Sulfamethoprim Ds] Unknown (comments)    Gabapentin Rash    Ibuprofen Unknown (comments)    Zestoretic [Lisinopril-Hydrochlorothiazide] Unknown (comments)       Review of Systems   Review of Systems   Constitutional: Negative for chills and fever. Respiratory: Negative for shortness of breath. Cardiovascular: Negative for chest pain. Gastrointestinal: Positive for abdominal pain and diarrhea. Negative for nausea and vomiting. Genitourinary: Positive for difficulty urinating. Negative for decreased urine volume. Musculoskeletal: Negative for back pain. Neurological: Negative for weakness and numbness. All other systems reviewed and are negative. Physical Exam     Vitals:    06/30/21 1626 06/30/21 1630 06/30/21 1700 06/30/21 1830   BP: (!) 173/85 (!) 170/90 (!) 164/88 (!) 161/86   Pulse: 62      Resp: 26      Temp: 98.2 °F (36.8 °C)      SpO2: 100% 99% 97% 99%     Physical Exam  Vitals and nursing note reviewed. Constitutional:       Appearance: She is well-developed. Comments:  alert oriented nontoxic and in no acute distress, sitting up on his bedside commode initially during exam   HENT:      Head: Normocephalic and atraumatic. Cardiovascular:      Rate and Rhythm: Normal rate and regular rhythm. Heart sounds: Normal heart sounds. No murmur heard. Pulmonary:      Effort: Pulmonary effort is normal. No respiratory distress. Breath sounds: Normal breath sounds. No wheezing or rales. Abdominal:      General: Bowel sounds are normal.      Palpations: Abdomen is soft. Tenderness: There is generalized abdominal tenderness. There is no right CVA tenderness or left CVA tenderness. Musculoskeletal:      Cervical back: Normal range of motion and neck supple. Neurological:      Mental Status: She is alert and oriented to person, place, and time.    Psychiatric:         Judgment: Judgment normal.         Diagnostic Study Results     Labs:     Recent Results (from the past 12 hour(s))   CBC WITH AUTOMATED DIFF Collection Time: 06/30/21  5:06 PM   Result Value Ref Range    WBC 7.8 4.6 - 13.2 K/uL    RBC 4.75 4.20 - 5.30 M/uL    HGB 14.0 12.0 - 16.0 g/dL    HCT 43.2 35.0 - 45.0 %    MCV 90.9 74.0 - 97.0 FL    MCH 29.5 24.0 - 34.0 PG    MCHC 32.4 31.0 - 37.0 g/dL    RDW 13.7 11.6 - 14.5 %    PLATELET 494 185 - 607 K/uL    MPV 9.5 9.2 - 11.8 FL    NEUTROPHILS 59 40 - 73 %    LYMPHOCYTES 32 21 - 52 %    MONOCYTES 6 3 - 10 %    EOSINOPHILS 2 0 - 5 %    BASOPHILS 0 0 - 2 %    ABS. NEUTROPHILS 4.6 1.8 - 8.0 K/UL    ABS. LYMPHOCYTES 2.5 0.9 - 3.6 K/UL    ABS. MONOCYTES 0.5 0.05 - 1.2 K/UL    ABS. EOSINOPHILS 0.1 0.0 - 0.4 K/UL    ABS. BASOPHILS 0.0 0.0 - 0.1 K/UL    DF AUTOMATED     METABOLIC PANEL, COMPREHENSIVE    Collection Time: 06/30/21  5:06 PM   Result Value Ref Range    Sodium 135 (L) 136 - 145 mmol/L    Potassium 4.8 3.5 - 5.5 mmol/L    Chloride 102 100 - 111 mmol/L    CO2 27 21 - 32 mmol/L    Anion gap 6 3.0 - 18 mmol/L    Glucose 84 74 - 99 mg/dL    BUN 17 7.0 - 18 MG/DL    Creatinine 1.25 0.6 - 1.3 MG/DL    BUN/Creatinine ratio 14 12 - 20      GFR est AA 51 (L) >60 ml/min/1.73m2    GFR est non-AA 42 (L) >60 ml/min/1.73m2    Calcium 9.3 8.5 - 10.1 MG/DL    Bilirubin, total 0.7 0.2 - 1.0 MG/DL    ALT (SGPT) 18 13 - 56 U/L    AST (SGOT) 38 10 - 38 U/L    Alk. phosphatase 79 45 - 117 U/L    Protein, total 7.5 6.4 - 8.2 g/dL    Albumin 3.4 3.4 - 5.0 g/dL    Globulin 4.1 (H) 2.0 - 4.0 g/dL    A-G Ratio 0.8 0.8 - 1.7     LIPASE    Collection Time: 06/30/21  5:06 PM   Result Value Ref Range    Lipase 645 (H) 73 - 393 U/L   POC LACTIC ACID    Collection Time: 06/30/21  5:14 PM   Result Value Ref Range    Lactic Acid (POC) 1.62 0.40 - 2.00 mmol/L       Radiologic Studies:   No orders to display     CT Results  (Last 48 hours)               06/29/21 1644  CT ABD PELV W CONT Final result    Impression:      1. No focal bowel wall thickening or inflammatory change. No morphology of   bowel obstruction. Normal appendix.    2. Large hiatus hernia. 3. Mild left basilar atelectasis with otherwise clear lung bases. 4. Grade 2 anterolisthesis L4 on L5 with advanced lower lumbar predominant   spondylosis and facet joint osteoarthritis. 5. Advanced, end stage osteoarthritis of the left hip joint       Narrative:  EXAM: CT of the abdomen and pelvis       INDICATION: 30-year-old patient with abdominal pain       COMPARISON: CT performed 1/18/2018       TECHNIQUE: Axial CT imaging of the abdomen and pelvis was performed with   intravenous contrast. Multiplanar reformats were generated. One or more dose reduction techniques were used on this CT: automated exposure   control, adjustment of the mAs and/or kVp according to patient size, and   iterative reconstruction techniques. The specific techniques used on this CT   exam have been documented in the patient's electronic medical record. Digital   Imaging and Communications in Medicine (DICOM) format image data are available   to nonaffiliated external healthcare facilities or entities on a secure, media   free, reciprocally searchable basis with patient authorization for at least a   12-month period after this study. _______________       FINDINGS:       LOWER CHEST: Mild basilar changes of atelectasis without alveolar consolidation   or pleural effusion. Normal cardiac size. No pericardial effusion. LIVER, BILIARY: Hepatic parenchymal enhancement is uniform. There are scattered   hepatic hypodensities redemonstrated which are either too small accurately   characterize or are in keeping with simple cysts. No biliary dilation. Gallbladder is unremarkable. PANCREAS: Normal.       SPLEEN: Normal.       ADRENALS: Normal.       KIDNEYS/URETERS/BLADDER: Symmetric renal enhancement. No hydronephrosis. Multiple bilateral renal hypodensities, some too small to accurately   characterize, with others representing simple cysts which require no further   dedicated imaging follow-up. Urinary bladder is unremarkable. PELVIC ORGANS: Several calcified and noncalcified uterine leiomyomata. Adnexa   appear within normal limits. VASCULATURE: Diffuse atherosclerosis. No evidence of aneurysmal dilatation. LYMPH NODES: No enlarged lymph nodes. GASTROINTESTINAL TRACT: There is a large hiatus hernia. No evidence of   perigastric inflammatory change or stranding. Gastroduodenal junction and is   below the level of the diaphragm. Evidence of prior colonic resection and   reanastomosis. No morphology of bowel obstruction. No free intraperitoneal gas. Normal appendix. BONES: Moderate anterolisthesis of L4 on L5. Multilevel lower thoracic and   lumbar spondylosis as well as lower lumbar predominant facet joint   osteoarthritis, as previously. 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 OTHER: None.       _______________               CXR Results  (Last 48 hours)    None          Medical Decision Making   I am the first provider for this patient. I reviewed the vital signs, available nursing notes, past medical history, past surgical history, family history and social history. Vital Signs: Reviewed the patient's vital signs. Pulse Oximetry Analysis: 99% on RA       Records Reviewed: Nursing Notes and Old Medical Records    Procedures:  Procedures    ED Course:   4:24 PM Initial assessment performed. The patients presenting problems have been discussed, and they are in agreement with the care plan formulated and outlined with them. I have encouraged them to ask questions as they arise throughout their visit. Case discussed with ED attending, Jonn Finch, see face to face note    Discussion:  Pt presents with feeling warm and right-sided abdominal pain.   Patient was seen here yesterday for right-sided abdominal pain and had a full work-up including abdominal CT showed no acute findings. Today she was having a bowel movement and felt an episode of \"steam in her body\" and came to the ER. Labs unchanged with the exception of slightly elevated lipase however patient has no left-sided abdominal pain and no vomiting. No indication for repeat imaging. Urine yesterday was normal.  Patient follow-up with her primary doctor to recheck her lipase level. Patient was seen by ED attending see face-to-face note she agrees with plan to discharge patient with Tylenol and advised to take Zofran at home as previously directed. Strict return precautions given, pt offering no questions or complaints. Diagnosis and Disposition     DISCHARGE NOTE:  Lorne Cowan  results have been reviewed with her. She has been counseled regarding her diagnosis, treatment, and plan. She verbally conveys understanding and agreement of the signs, symptoms, diagnosis, treatment and prognosis and additionally agrees to follow up as discussed. She also agrees with the care-plan and conveys that all of her questions have been answered. I have also provided discharge instructions for her that include: educational information regarding their diagnosis and treatment, and list of reasons why they would want to return to the ED prior to their follow-up appointment, should her condition change. She has been provided with education for proper emergency department utilization. CLINICAL IMPRESSION:    1. Abdominal pain, right lower quadrant    2. Elevated lipase        PLAN:  1. D/C Home  2. Discharge Medication List as of 6/30/2021  6:57 PM        3.    Follow-up Information     Follow up With Specialties Details Why Contact Info    Angelo Castillo MD Internal Medicine Schedule an appointment as soon as possible for a visit   [facility] [address] [facility] [address]  [phone]      [facility] EMERGENCY DEPT Emergency Medicine  If symptoms worsen [address] [place] [id number]  [phone]    [facility] EMERGENCY DEPT Emergency Medicine  If symptoms worsen [address] [place]  [phone]    Selina Jung MD Gastroenterology   [address] [place] [address] [facility] [phone]               Please note that this dictation was completed with [employer], the computer voice recognition software. Quite often unanticipated grammatical, syntax, homophones, and other interpretive errors are inadvertently transcribed by the computer software. Please disregard these errors. Please excuse any errors that have escaped final proofreading.

## 2021-06-30 NOTE — ED TRIAGE NOTES
Pt to ED via EMS with c/o N/V and diarrhea.  Pt had same symptoms yesturday was seen here in the ED and was prescribed Zofran that pt reports she has not yet taken

## 2021-07-01 ENCOUNTER — APPOINTMENT (OUTPATIENT)
Dept: CT IMAGING | Age: 76
End: 2021-07-01
Attending: EMERGENCY MEDICINE
Payer: MEDICARE

## 2021-07-01 ENCOUNTER — HOSPITAL ENCOUNTER (OUTPATIENT)
Age: 76
Setting detail: OBSERVATION
Discharge: HOME HEALTH | End: 2021-07-07
Attending: EMERGENCY MEDICINE | Admitting: HOSPITALIST
Payer: MEDICARE

## 2021-07-01 ENCOUNTER — APPOINTMENT (OUTPATIENT)
Dept: ULTRASOUND IMAGING | Age: 76
End: 2021-07-01
Attending: EMERGENCY MEDICINE
Payer: MEDICARE

## 2021-07-01 DIAGNOSIS — R11.2 INTRACTABLE VOMITING WITH NAUSEA, UNSPECIFIED VOMITING TYPE: Primary | ICD-10-CM

## 2021-07-01 DIAGNOSIS — K44.9 LARGE HIATAL HERNIA: ICD-10-CM

## 2021-07-01 DIAGNOSIS — K21.9 GASTROESOPHAGEAL REFLUX DISEASE, UNSPECIFIED WHETHER ESOPHAGITIS PRESENT: ICD-10-CM

## 2021-07-01 PROBLEM — E87.1 HYPONATREMIA: Status: ACTIVE | Noted: 2021-07-01

## 2021-07-01 PROBLEM — R11.10 INTRACTABLE VOMITING: Status: ACTIVE | Noted: 2021-07-01

## 2021-07-01 PROBLEM — R10.31 RIGHT LOWER QUADRANT ABDOMINAL PAIN: Status: ACTIVE | Noted: 2021-07-01

## 2021-07-01 LAB
ALBUMIN SERPL-MCNC: 3.4 G/DL (ref 3.4–5)
ALBUMIN/GLOB SERPL: 0.9 {RATIO} (ref 0.8–1.7)
ALP SERPL-CCNC: 76 U/L (ref 45–117)
ALT SERPL-CCNC: 17 U/L (ref 13–56)
ANION GAP SERPL CALC-SCNC: 8 MMOL/L (ref 3–18)
APPEARANCE UR: CLEAR
AST SERPL-CCNC: 36 U/L (ref 10–38)
ATRIAL RATE: 73 BPM
BASOPHILS # BLD: 0 K/UL (ref 0–0.1)
BASOPHILS NFR BLD: 0 % (ref 0–2)
BILIRUB SERPL-MCNC: 0.7 MG/DL (ref 0.2–1)
BILIRUB UR QL: NEGATIVE
BUN SERPL-MCNC: 14 MG/DL (ref 7–18)
BUN/CREAT SERPL: 12 (ref 12–20)
CALCIUM SERPL-MCNC: 9.4 MG/DL (ref 8.5–10.1)
CALCULATED P AXIS, ECG09: 66 DEGREES
CALCULATED R AXIS, ECG10: 12 DEGREES
CALCULATED T AXIS, ECG11: 20 DEGREES
CHLORIDE SERPL-SCNC: 96 MMOL/L (ref 100–111)
CK MB CFR SERPL CALC: 1.3 % (ref 0–4)
CK MB SERPL-MCNC: 5.1 NG/ML (ref 5–25)
CK SERPL-CCNC: 406 U/L (ref 26–192)
CO2 SERPL-SCNC: 26 MMOL/L (ref 21–32)
COLOR UR: YELLOW
CREAT SERPL-MCNC: 1.18 MG/DL (ref 0.6–1.3)
DIAGNOSIS, 93000: NORMAL
DIFFERENTIAL METHOD BLD: NORMAL
EOSINOPHIL # BLD: 0 K/UL (ref 0–0.4)
EOSINOPHIL NFR BLD: 1 % (ref 0–5)
ERYTHROCYTE [DISTWIDTH] IN BLOOD BY AUTOMATED COUNT: 13.4 % (ref 11.6–14.5)
GLOBULIN SER CALC-MCNC: 4 G/DL (ref 2–4)
GLUCOSE SERPL-MCNC: 98 MG/DL (ref 74–99)
GLUCOSE UR STRIP.AUTO-MCNC: NEGATIVE MG/DL
HCT VFR BLD AUTO: 42.2 % (ref 35–45)
HGB BLD-MCNC: 13.5 G/DL (ref 12–16)
HGB UR QL STRIP: NEGATIVE
KETONES UR QL STRIP.AUTO: NEGATIVE MG/DL
LEUKOCYTE ESTERASE UR QL STRIP.AUTO: NEGATIVE
LIPASE SERPL-CCNC: 203 U/L (ref 73–393)
LYMPHOCYTES # BLD: 2 K/UL (ref 0.9–3.6)
LYMPHOCYTES NFR BLD: 25 % (ref 21–52)
MAGNESIUM SERPL-MCNC: 1.4 MG/DL (ref 1.6–2.6)
MCH RBC QN AUTO: 29 PG (ref 24–34)
MCHC RBC AUTO-ENTMCNC: 32 G/DL (ref 31–37)
MCV RBC AUTO: 90.6 FL (ref 74–97)
MONOCYTES # BLD: 0.5 K/UL (ref 0.05–1.2)
MONOCYTES NFR BLD: 6 % (ref 3–10)
NEUTS SEG # BLD: 5.3 K/UL (ref 1.8–8)
NEUTS SEG NFR BLD: 67 % (ref 40–73)
NITRITE UR QL STRIP.AUTO: NEGATIVE
P-R INTERVAL, ECG05: 186 MS
PH UR STRIP: 6.5 [PH] (ref 5–8)
PLATELET # BLD AUTO: 248 K/UL (ref 135–420)
PMV BLD AUTO: 9.4 FL (ref 9.2–11.8)
POTASSIUM SERPL-SCNC: 4.7 MMOL/L (ref 3.5–5.5)
PROT SERPL-MCNC: 7.4 G/DL (ref 6.4–8.2)
PROT UR STRIP-MCNC: NEGATIVE MG/DL
Q-T INTERVAL, ECG07: 418 MS
QRS DURATION, ECG06: 78 MS
QTC CALCULATION (BEZET), ECG08: 460 MS
RBC # BLD AUTO: 4.66 M/UL (ref 4.2–5.3)
SODIUM SERPL-SCNC: 130 MMOL/L (ref 136–145)
SP GR UR REFRACTOMETRY: 1.01 (ref 1–1.03)
TROPONIN I SERPL-MCNC: <0.02 NG/ML (ref 0–0.04)
UROBILINOGEN UR QL STRIP.AUTO: 0.2 EU/DL (ref 0.2–1)
VENTRICULAR RATE, ECG03: 73 BPM
WBC # BLD AUTO: 7.9 K/UL (ref 4.6–13.2)

## 2021-07-01 PROCEDURE — 99218 HC RM OBSERVATION: CPT

## 2021-07-01 PROCEDURE — 82553 CREATINE MB FRACTION: CPT

## 2021-07-01 PROCEDURE — 99285 EMERGENCY DEPT VISIT HI MDM: CPT

## 2021-07-01 PROCEDURE — 81003 URINALYSIS AUTO W/O SCOPE: CPT

## 2021-07-01 PROCEDURE — 74011000636 HC RX REV CODE- 636: Performed by: EMERGENCY MEDICINE

## 2021-07-01 PROCEDURE — 76705 ECHO EXAM OF ABDOMEN: CPT

## 2021-07-01 PROCEDURE — 93005 ELECTROCARDIOGRAM TRACING: CPT

## 2021-07-01 PROCEDURE — 74177 CT ABD & PELVIS W/CONTRAST: CPT

## 2021-07-01 PROCEDURE — 96376 TX/PRO/DX INJ SAME DRUG ADON: CPT

## 2021-07-01 PROCEDURE — 74011250636 HC RX REV CODE- 250/636: Performed by: EMERGENCY MEDICINE

## 2021-07-01 PROCEDURE — 96375 TX/PRO/DX INJ NEW DRUG ADDON: CPT

## 2021-07-01 PROCEDURE — 74011000258 HC RX REV CODE- 258: Performed by: EMERGENCY MEDICINE

## 2021-07-01 PROCEDURE — 85025 COMPLETE CBC W/AUTO DIFF WBC: CPT

## 2021-07-01 PROCEDURE — 80053 COMPREHEN METABOLIC PANEL: CPT

## 2021-07-01 PROCEDURE — 74011250637 HC RX REV CODE- 250/637: Performed by: EMERGENCY MEDICINE

## 2021-07-01 PROCEDURE — 83690 ASSAY OF LIPASE: CPT

## 2021-07-01 PROCEDURE — 84300 ASSAY OF URINE SODIUM: CPT

## 2021-07-01 PROCEDURE — 74011250636 HC RX REV CODE- 250/636: Performed by: HOSPITALIST

## 2021-07-01 PROCEDURE — 83735 ASSAY OF MAGNESIUM: CPT

## 2021-07-01 PROCEDURE — 74011250637 HC RX REV CODE- 250/637: Performed by: HOSPITALIST

## 2021-07-01 PROCEDURE — 96374 THER/PROPH/DIAG INJ IV PUSH: CPT

## 2021-07-01 PROCEDURE — 96372 THER/PROPH/DIAG INJ SC/IM: CPT

## 2021-07-01 RX ORDER — ENOXAPARIN SODIUM 100 MG/ML
40 INJECTION SUBCUTANEOUS EVERY 24 HOURS
Status: DISCONTINUED | OUTPATIENT
Start: 2021-07-01 | End: 2021-07-05

## 2021-07-01 RX ORDER — MORPHINE SULFATE 2 MG/ML
2 INJECTION, SOLUTION INTRAMUSCULAR; INTRAVENOUS
Status: COMPLETED | OUTPATIENT
Start: 2021-07-01 | End: 2021-07-01

## 2021-07-01 RX ORDER — FAMOTIDINE 20 MG/1
20 TABLET, FILM COATED ORAL DAILY
Status: DISCONTINUED | OUTPATIENT
Start: 2021-07-02 | End: 2021-07-02

## 2021-07-01 RX ORDER — DICYCLOMINE HYDROCHLORIDE 10 MG/1
20 CAPSULE ORAL
Status: COMPLETED | OUTPATIENT
Start: 2021-07-01 | End: 2021-07-01

## 2021-07-01 RX ORDER — SODIUM CHLORIDE 9 MG/ML
75 INJECTION, SOLUTION INTRAVENOUS CONTINUOUS
Status: DISCONTINUED | OUTPATIENT
Start: 2021-07-01 | End: 2021-07-07 | Stop reason: HOSPADM

## 2021-07-01 RX ORDER — ASPIRIN 325 MG
325 TABLET ORAL DAILY
Status: DISCONTINUED | OUTPATIENT
Start: 2021-07-02 | End: 2021-07-07 | Stop reason: HOSPADM

## 2021-07-01 RX ORDER — HYDROCHLOROTHIAZIDE 25 MG/1
25 TABLET ORAL DAILY
Status: DISCONTINUED | OUTPATIENT
Start: 2021-07-02 | End: 2021-07-07 | Stop reason: HOSPADM

## 2021-07-01 RX ORDER — MORPHINE SULFATE 2 MG/ML
1 INJECTION, SOLUTION INTRAMUSCULAR; INTRAVENOUS
Status: DISCONTINUED | OUTPATIENT
Start: 2021-07-01 | End: 2021-07-07 | Stop reason: HOSPADM

## 2021-07-01 RX ORDER — ONDANSETRON 2 MG/ML
4 INJECTION INTRAMUSCULAR; INTRAVENOUS
Status: COMPLETED | OUTPATIENT
Start: 2021-07-01 | End: 2021-07-01

## 2021-07-01 RX ORDER — ONDANSETRON 2 MG/ML
4 INJECTION INTRAMUSCULAR; INTRAVENOUS
Status: DISCONTINUED | OUTPATIENT
Start: 2021-07-01 | End: 2021-07-07 | Stop reason: HOSPADM

## 2021-07-01 RX ORDER — SPIRONOLACTONE 25 MG/1
25 TABLET ORAL DAILY
Status: DISCONTINUED | OUTPATIENT
Start: 2021-07-02 | End: 2021-07-07 | Stop reason: HOSPADM

## 2021-07-01 RX ORDER — ATORVASTATIN CALCIUM 10 MG/1
10 TABLET, FILM COATED ORAL
Status: DISCONTINUED | OUTPATIENT
Start: 2021-07-01 | End: 2021-07-07 | Stop reason: HOSPADM

## 2021-07-01 RX ORDER — METOPROLOL TARTRATE 50 MG/1
100 TABLET ORAL 2 TIMES DAILY
Status: DISCONTINUED | OUTPATIENT
Start: 2021-07-01 | End: 2021-07-06

## 2021-07-01 RX ADMIN — PROMETHAZINE HYDROCHLORIDE 12.5 MG: 25 INJECTION INTRAMUSCULAR; INTRAVENOUS at 08:33

## 2021-07-01 RX ADMIN — ENOXAPARIN SODIUM 40 MG: 40 INJECTION SUBCUTANEOUS at 16:31

## 2021-07-01 RX ADMIN — MORPHINE SULFATE 1 MG: 2 INJECTION, SOLUTION INTRAMUSCULAR; INTRAVENOUS at 21:07

## 2021-07-01 RX ADMIN — DICYCLOMINE HYDROCHLORIDE 20 MG: 10 CAPSULE ORAL at 04:50

## 2021-07-01 RX ADMIN — IOPAMIDOL 75 ML: 612 INJECTION, SOLUTION INTRAVENOUS at 07:28

## 2021-07-01 RX ADMIN — SODIUM CHLORIDE 1000 ML: 900 INJECTION, SOLUTION INTRAVENOUS at 04:45

## 2021-07-01 RX ADMIN — MORPHINE SULFATE 2 MG: 2 INJECTION, SOLUTION INTRAMUSCULAR; INTRAVENOUS at 04:46

## 2021-07-01 RX ADMIN — ONDANSETRON 4 MG: 2 INJECTION INTRAMUSCULAR; INTRAVENOUS at 04:45

## 2021-07-01 RX ADMIN — METOPROLOL TARTRATE 100 MG: 50 TABLET, FILM COATED ORAL at 22:55

## 2021-07-01 RX ADMIN — SODIUM CHLORIDE 100 ML/HR: 900 INJECTION, SOLUTION INTRAVENOUS at 16:24

## 2021-07-01 RX ADMIN — ATORVASTATIN CALCIUM 10 MG: 10 TABLET, FILM COATED ORAL at 22:55

## 2021-07-01 RX ADMIN — MORPHINE SULFATE 1 MG: 2 INJECTION, SOLUTION INTRAMUSCULAR; INTRAVENOUS at 16:27

## 2021-07-01 NOTE — ED PROVIDER NOTES
EMERGENCY DEPARTMENT HISTORY AND PHYSICAL EXAM    Date: 7/1/2021  Patient Name: Ekaterina Bolanos    History of Presenting Illness     Chief Complaint   Patient presents with    Abdominal Pain    Rectal Bleeding         History Provided By: Patient    Additional History (Context):   5:51 AM  Ekaterina Bolanos is a 78 y.o. female with PMHX of hypertension, high cholesterol, distant history of breast cancer with distant history of DVT, reflux with very large hiatal hernia who presents to the emergency department C/O abdominal pain and vomiting. Patient was brought by her family back to the emergency department for worsening abdominal pain and vomiting. I spoke with the family on the phone earlier where they said \"she was stuck on the toilet\". They were concerned about ability to care for at home with persistent diarrhea but I told them without a dangerous infectious problems and would not have ability for admission for such a problem. After she arrived she had intractable vomiting despite home medications of Zofran and Reglan. She was somewhat tearful and anxious with some of the symptoms but clearly had pain and tenderness to the epigastric and right upper quadrant. She had previous CAT scan what was unremarkable but no abdominal ultrasound. We also need to consider cardiac as a possible source. She is got a history of multiple orthopedic and breast lumpectomies but a vague history of some type of abdominal tumor which was removed and years in a distant past.  Both she and her family deny any fevers or chills or urinary color changes other than there is diminution in her urinary output. .    Social History  No smoking drinking or drugs    Family History  Positive for high blood pressure and diabetes but negative for bleeding disorders blood clots were autoimmune disease. Negative for Crohn's disease ulcerative colitis or colon cancer.       PCP: Octavia Agrawal MD    Current Outpatient Medications Medication Sig Dispense Refill    ondansetron (ZOFRAN ODT) 4 mg disintegrating tablet Take 1 Tablet by mouth every eight (8) hours as needed for Nausea for up to 12 doses. 12 Tablet 0    ergocalciferol (VITAMIN D2) 50,000 unit capsule Take 50,000 Units by mouth every seven (7) days. Dr. Randi Pereira with [facility]  Indications: Vitamin D Deficiency      raNITIdine hcl 300 mg cap Take 1 Cap by mouth daily. Saman barclay with [facility] Pemberton metoprolol ta-hydrochlorothiaz (LOPRESSOR HCT) 100-50 mg per tablet Take 1 Tab by mouth two (2) times a day. Indications: hypertension      nystatin (NYAMYC) powder Apply 1 g to affected area two (2) times a day. Indications: CUTANEOUS CANDIDIASIS, Diaper Rash      nystatin (MYCOSTATIN) powder Apply  to affected area two (2) times a day. 60 g 0    hydroCHLOROthiazide (HYDRODIURIL) 25 mg tablet Take 1 Tab by mouth daily. 30 Tab 0    spironolactone (ALDACTONE) 25 mg tablet Take 1 Tab by mouth daily. 30 Tab 0    sucralfate (CARAFATE) 1 gram tablet Take 1 Tab by mouth four (4) times daily. (Patient not taking: Reported on 2/8/2018) 60 Tab 2    lovastatin (MEVACOR) 40 mg tablet Take 40 mg by mouth nightly.  cyclobenzaprine (FLEXERIL) 10 mg tablet Take 10 mg by mouth three (3) times daily as needed for Muscle Spasm(s).  aspirin (ASPIRIN) 325 mg tablet Take 325 mg by mouth daily.  famotidine (PEPCID) 20 mg tablet Take 20 mg by mouth two (2) times a day.            Past History     Past Medical History:  Past Medical History:   Diagnosis Date    Arthritis     Blood clot in vein 2004    left leg     [facility] ([facility] [address]     Cancer ([facility] [address]     right breast    GERD (gastroesophageal reflux disease)     High cholesterol     Hypertension     Intractable vomiting 7/1/2021    Other ill-defined conditions(799.89)     high cholestrol    Vertigo        Past Surgical History:  Past Surgical History:   Procedure Laterality Date    HX BREAST LUMPECTOMY      HX GI colonosocpy    HX KNEE REPLACEMENT      HX ORTHOPAEDIC      total knee replacement bilateral    HX ORTHOPAEDIC      excision of cyst left hand    HX TUBAL LIGATION      HX TUMOR REMOVAL      MI ABDOMEN SURGERY PROC UNLISTED      removal of tumor abdomen       Family History:  No family history on file. Social History:  Social History     Tobacco Use    Smoking status: Passive Smoke Exposure - Never Smoker    Smokeless tobacco: Never Used   Substance Use Topics    Alcohol use: No    Drug use: No       Allergies: Allergies   Allergen Reactions    Amiloride Nausea and Vomiting    Amoxicillin Rash    Bactrim [Sulfamethoprim Ds] Unknown (comments)    Gabapentin Rash    Ibuprofen Unknown (comments)    Zestoretic [Lisinopril-Hydrochlorothiazide] Unknown (comments)         Review of Systems   Review of Systems   Constitutional: Positive for appetite change and fatigue. Gastrointestinal: Positive for abdominal pain, diarrhea, nausea and vomiting. Genitourinary: Positive for decreased urine volume. Neurological: Positive for dizziness. All other systems reviewed and are negative. Physical Exam     Vitals:    07/01/21 0830 07/01/21 0837 07/01/21 0845 07/01/21 0900   BP: (!) 165/83  (!) 172/84 (!) 152/76   Pulse:   64 (!) 57   Resp:   27 21   Temp:       SpO2: 100% 100% 98% 100%   Weight:       Height:         Physical Exam  Vitals and nursing note reviewed. Constitutional:       General: She is not in acute distress. Appearance: She is well-developed. She is not diaphoretic. HENT:      Head: Normocephalic and atraumatic. Eyes:      General: No scleral icterus. Extraocular Movements:      Right eye: Normal extraocular motion. Left eye: Normal extraocular motion. Conjunctiva/sclera: Conjunctivae normal.      Pupils: Pupils are equal, round, and reactive to light. Neck:      Trachea: No tracheal deviation.    Cardiovascular:      Rate and Rhythm: Normal rate and regular rhythm. Heart sounds: Normal heart sounds. Pulmonary:      Effort: Pulmonary effort is normal. No respiratory distress. Breath sounds: Normal breath sounds. No stridor. Abdominal:      General: Bowel sounds are normal. There is no distension. Palpations: Abdomen is soft. Tenderness: There is abdominal tenderness in the right upper quadrant, right lower quadrant and epigastric area. There is right CVA tenderness and guarding. There is no rebound. Positive signs include Sam's sign. Musculoskeletal:         General: No tenderness. Normal range of motion. Cervical back: Normal range of motion and neck supple. Comments: Grossly unremarkable without abnormalities   Skin:     General: Skin is warm and dry. Capillary Refill: Capillary refill takes less than 2 seconds. Findings: No erythema or rash. Neurological:      Mental Status: She is alert and oriented to person, place, and time. GCS: GCS eye subscore is 4. GCS verbal subscore is 5. GCS motor subscore is 6. Cranial Nerves: No cranial nerve deficit. Sensory: Sensation is intact. Motor: No weakness. Coordination: Coordination is intact. Psychiatric:         Attention and Perception: Attention normal.         Mood and Affect: Mood is anxious. Affect is tearful. Speech: Speech normal.         Behavior: Behavior normal.         Thought Content:  Thought content normal.         Judgment: Judgment normal.      Comments: Tearful and anxious       Diagnostic Study Results     Labs -  Recent Results (from the past 24 hour(s))   CBC WITH AUTOMATED DIFF    Collection Time: 06/30/21  5:06 PM   Result Value Ref Range    WBC 7.8 4.6 - 13.2 K/uL    RBC 4.75 4.20 - 5.30 M/uL    HGB 14.0 12.0 - 16.0 g/dL    HCT 43.2 35.0 - 45.0 %    MCV 90.9 74.0 - 97.0 FL    MCH 29.5 24.0 - 34.0 PG    MCHC 32.4 31.0 - 37.0 g/dL    RDW 13.7 11.6 - 14.5 %    PLATELET 784 535 - 531 K/uL    MPV 9.5 9.2 - 11.8 FL NEUTROPHILS 59 40 - 73 %    LYMPHOCYTES 32 21 - 52 %    MONOCYTES 6 3 - 10 %    EOSINOPHILS 2 0 - 5 %    BASOPHILS 0 0 - 2 %    ABS. NEUTROPHILS 4.6 1.8 - 8.0 K/UL    ABS. LYMPHOCYTES 2.5 0.9 - 3.6 K/UL    ABS. MONOCYTES 0.5 0.05 - 1.2 K/UL    ABS. EOSINOPHILS 0.1 0.0 - 0.4 K/UL    ABS. BASOPHILS 0.0 0.0 - 0.1 K/UL    DF AUTOMATED     METABOLIC PANEL, COMPREHENSIVE    Collection Time: 06/30/21  5:06 PM   Result Value Ref Range    Sodium 135 (L) 136 - 145 mmol/L    Potassium 4.8 3.5 - 5.5 mmol/L    Chloride 102 100 - 111 mmol/L    CO2 27 21 - 32 mmol/L    Anion gap 6 3.0 - 18 mmol/L    Glucose 84 74 - 99 mg/dL    BUN 17 7.0 - 18 MG/DL    Creatinine 1.25 0.6 - 1.3 MG/DL    BUN/Creatinine ratio 14 12 - 20      GFR est AA 51 (L) >60 ml/min/1.73m2    GFR est non-AA 42 (L) >60 ml/min/1.73m2    Calcium 9.3 8.5 - 10.1 MG/DL    Bilirubin, total 0.7 0.2 - 1.0 MG/DL    ALT (SGPT) 18 13 - 56 U/L    AST (SGOT) 38 10 - 38 U/L    Alk. phosphatase 79 45 - 117 U/L    Protein, total 7.5 6.4 - 8.2 g/dL    Albumin 3.4 3.4 - 5.0 g/dL    Globulin 4.1 (H) 2.0 - 4.0 g/dL    A-G Ratio 0.8 0.8 - 1.7     LIPASE    Collection Time: 06/30/21  5:06 PM   Result Value Ref Range    Lipase 645 (H) 73 - 393 U/L   POC LACTIC ACID    Collection Time: 06/30/21  5:14 PM   Result Value Ref Range    Lactic Acid (POC) 1.62 0.40 - 2.00 mmol/L   CBC WITH AUTOMATED DIFF    Collection Time: 07/01/21  3:45 AM   Result Value Ref Range    WBC 7.9 4.6 - 13.2 K/uL    RBC 4.66 4.20 - 5.30 M/uL    HGB 13.5 12.0 - 16.0 g/dL    HCT 42.2 35.0 - 45.0 %    MCV 90.6 74.0 - 97.0 FL    MCH 29.0 24.0 - 34.0 PG    MCHC 32.0 31.0 - 37.0 g/dL    RDW 13.4 11.6 - 14.5 %    PLATELET 283 556 - 351 K/uL    MPV 9.4 9.2 - 11.8 FL    NEUTROPHILS 67 40 - 73 %    LYMPHOCYTES 25 21 - 52 %    MONOCYTES 6 3 - 10 %    EOSINOPHILS 1 0 - 5 %    BASOPHILS 0 0 - 2 %    ABS. NEUTROPHILS 5.3 1.8 - 8.0 K/UL    ABS. LYMPHOCYTES 2.0 0.9 - 3.6 K/UL    ABS. MONOCYTES 0.5 0.05 - 1.2 K/UL    ABS. EOSINOPHILS 0.0 0.0 - 0.4 K/UL    ABS. BASOPHILS 0.0 0.0 - 0.1 K/UL    DF AUTOMATED     METABOLIC PANEL, COMPREHENSIVE    Collection Time: 07/01/21  3:45 AM   Result Value Ref Range    Sodium 130 (L) 136 - 145 mmol/L    Potassium 4.7 3.5 - 5.5 mmol/L    Chloride 96 (L) 100 - 111 mmol/L    CO2 26 21 - 32 mmol/L    Anion gap 8 3.0 - 18 mmol/L    Glucose 98 74 - 99 mg/dL    BUN 14 7.0 - 18 MG/DL    Creatinine 1.18 0.6 - 1.3 MG/DL    BUN/Creatinine ratio 12 12 - 20      GFR est AA 54 (L) >60 ml/min/1.73m2    GFR est non-AA 45 (L) >60 ml/min/1.73m2    Calcium 9.4 8.5 - 10.1 MG/DL    Bilirubin, total 0.7 0.2 - 1.0 MG/DL    ALT (SGPT) 17 13 - 56 U/L    AST (SGOT) 36 10 - 38 U/L    Alk.  phosphatase 76 45 - 117 U/L    Protein, total 7.4 6.4 - 8.2 g/dL    Albumin 3.4 3.4 - 5.0 g/dL    Globulin 4.0 2.0 - 4.0 g/dL    A-G Ratio 0.9 0.8 - 1.7     LIPASE    Collection Time: 07/01/21  3:45 AM   Result Value Ref Range    Lipase 203 73 - 393 U/L   CARDIAC PANEL,(CK, CKMB & TROPONIN)    Collection Time: 07/01/21  3:45 AM   Result Value Ref Range    CK - MB 5.1 (H) <3.6 ng/ml    CK-MB Index 1.3 0.0 - 4.0 %     (H) 26 - 192 U/L    Troponin-I, QT <0.02 0.0 - 0.045 NG/ML   EKG, 12 LEAD, INITIAL    Collection Time: 07/01/21  4:59 AM   Result Value Ref Range    Ventricular Rate 73 BPM    Atrial Rate 73 BPM    P-R Interval 186 ms    QRS Duration 78 ms    Q-T Interval 418 ms    QTC Calculation (Bezet) 460 ms    Calculated P Axis 66 degrees    Calculated R Axis 12 degrees    Calculated T Axis 20 degrees    Diagnosis       Normal sinus rhythm  Normal ECG  When compared with ECG of 29-JUN-2021 16:06,  Criteria for Septal infarct are no longer present     URINALYSIS W/ RFLX MICROSCOPIC    Collection Time: 07/01/21  7:28 AM   Result Value Ref Range    Color YELLOW      Appearance CLEAR      Specific gravity 1.009 1.005 - 1.030      pH (UA) 6.5 5.0 - 8.0      Protein Negative NEG mg/dL    Glucose Negative NEG mg/dL    Ketone Negative NEG mg/dL    Bilirubin Negative NEG      Blood Negative NEG      Urobilinogen 0.2 0.2 - 1.0 EU/dL    Nitrites Negative NEG      Leukocyte Esterase Negative NEG          Radiologic Studies -   CT ABD PELV W CONT   Final Result      1. Redemonstration of a large hiatus hernia without clearly acute   intra-abdominal or pelvic abnormality demonstrated on today's examination.      > No abnormal bowel wall thickening or inflammatory change appreciated. 2. Unchanged small hepatic and renal cysts. 3. Grade 2 anterolisthesis L4 on L5; advanced, end stage osteoarthritis left hip   joint. US ABD LTD   Final Result      1. Unremarkable sonographic exam of the gallbladder, with no new acute findings. 2. Stable small benign-appearing hepatic and right renal cysts. CT Results  (Last 48 hours)               07/01/21 0727  CT ABD PELV W CONT Final result    Impression:      1. Redemonstration of a large hiatus hernia without clearly acute   intra-abdominal or pelvic abnormality demonstrated on today's examination.      > No abnormal bowel wall thickening or inflammatory change appreciated. 2. Unchanged small hepatic and renal cysts. 3. Grade 2 anterolisthesis L4 on L5; advanced, end stage osteoarthritis left hip   joint. Narrative:  EXAM: CT of the abdomen and pelvis       INDICATION: Nausea, vomiting, blood in stool. COMPARISON: CT June 29, 2021, 1/18/2018       TECHNIQUE: Axial CT imaging of the abdomen and pelvis was performed with   intravenous contrast. Multiplanar reformats were generated. One or more dose reduction techniques were used on this CT: automated exposure   control, adjustment of the mAs and/or kVp according to patient size, and   iterative reconstruction techniques. The specific techniques used on this CT   exam have been documented in the patient's electronic medical record.   Digital   Imaging and Communications in Medicine (DICOM) format image data are available   to nonaffiliated external healthcare facilities or entities on a secure, media   free, reciprocally searchable basis with patient authorization for at least a   12-month period after this study. _______________       FINDINGS:       LOWER CHEST: Mild bibasilar atelectasis without evidence of alveolar   consolidation or pleural effusion. Cardiac size is within normal limits. Multivessel coronary arterial atherosclerotic vascular calcifications. LIVER, BILIARY: Redemonstration of multiple hepatic hypodensities, either too   small to accurately characterize, or in keeping with simple cysts. No biliary   dilation. Gallbladder is unremarkable. PANCREAS: Normal.       SPLEEN: Normal.       ADRENALS: Normal.       KIDNEYS/URETERS/BLADDER: Symmetric renal enhancement without hydronephrosis. Bilateral renal hypodensities, either too small to accurately characterize are   in keeping with simple cysts. These appear unchanged from prior. No   urolithiasis. Urinary bladder unremarkable. PELVIC ORGANS: Several calcified and noncalcified uterine leiomyomata. VASCULATURE: Atherosclerosis. No evidence of aneurysmal dilatation. LYMPH NODES: No enlarged lymph nodes. GASTROINTESTINAL TRACT: Large hiatus hernia redemonstrated. No abnormal bowel   wall thickening or dilatation. Prior colonic resection and reanastomosis within   the right upper quadrant of the abdomen. BONES: No acute or aggressive osseous abnormalities identified. Multilevel   spondylosis throughout the lower thoracic and lumbar spine. Grade 2   anterolisthesis L4 on L5. Advanced, end stage osteoarthritis of the left hip   joint; all appearing unchanged. OTHER: None.       _______________           06/29/21 1644  CT ABD PELV W CONT Final result    Impression:      1. No focal bowel wall thickening or inflammatory change. No morphology of   bowel obstruction. Normal appendix.    2. Large hiatus hernia. 3. Mild left basilar atelectasis with otherwise clear lung bases. 4. Grade 2 anterolisthesis L4 on L5 with advanced lower lumbar predominant   spondylosis and facet joint osteoarthritis. 5. Advanced, end stage osteoarthritis of the left hip joint       Narrative:  EXAM: CT of the abdomen and pelvis       INDICATION: 44-year-old patient with abdominal pain       COMPARISON: CT performed 1/18/2018       TECHNIQUE: Axial CT imaging of the abdomen and pelvis was performed with   intravenous contrast. Multiplanar reformats were generated. One or more dose reduction techniques were used on this CT: automated exposure   control, adjustment of the mAs and/or kVp according to patient size, and   iterative reconstruction techniques. The specific techniques used on this CT   exam have been documented in the patient's electronic medical record. Digital   Imaging and Communications in Medicine (DICOM) format image data are available   to nonaffiliated external healthcare facilities or entities on a secure, media   free, reciprocally searchable basis with patient authorization for at least a   12-month period after this study. _______________       FINDINGS:       LOWER CHEST: Mild basilar changes of atelectasis without alveolar consolidation   or pleural effusion. Normal cardiac size. No pericardial effusion. LIVER, BILIARY: Hepatic parenchymal enhancement is uniform. There are scattered   hepatic hypodensities redemonstrated which are either too small accurately   characterize or are in keeping with simple cysts. No biliary dilation. Gallbladder is unremarkable. PANCREAS: Normal.       SPLEEN: Normal.       ADRENALS: Normal.       KIDNEYS/URETERS/BLADDER: Symmetric renal enhancement. No hydronephrosis. Multiple bilateral renal hypodensities, some too small to accurately   characterize, with others representing simple cysts which require no further   dedicated imaging follow-up. Urinary bladder is unremarkable. PELVIC ORGANS: Several calcified and noncalcified uterine leiomyomata. Adnexa   appear within normal limits. VASCULATURE: Diffuse atherosclerosis. No evidence of aneurysmal dilatation. LYMPH NODES: No enlarged lymph nodes. GASTROINTESTINAL TRACT: There is a large hiatus hernia. No evidence of   perigastric inflammatory change or stranding. Gastroduodenal junction and is   below the level of the diaphragm. Evidence of prior colonic resection and   reanastomosis. No morphology of bowel obstruction. No free intraperitoneal gas. Normal appendix. BONES: Moderate anterolisthesis of L4 on L5. Multilevel lower thoracic and   lumbar spondylosis as well as lower lumbar predominant facet joint   osteoarthritis, as previously. 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 OTHER: None.       _______________               CXR Results  (Last 48 hours)    None          Medications given in the ED-  Medications   dicyclomine (BENTYL) capsule 20 mg (20 mg Oral Given 7/1/21 0450)   sodium chloride 0.9 % bolus infusion 1,000 mL (0 mL IntraVENous IV Completed 7/1/21 0700)   ondansetron (ZOFRAN) injection 4 mg (4 mg IntraVENous Given 7/1/21 0445)   morphine injection 2 mg (2 mg IntraVENous Given 7/1/21 0446)   iopamidoL (ISOVUE 300) 61 % contrast injection  mL (75 mL IntraVENous Given 7/1/21 0728)   promethazine (PHENERGAN) 12.5 mg in 0.9% sodium chloride 50 mL IVPB (0 mg IntraVENous IV Completed 7/1/21 0845)         Medical Decision Making   I am the first provider for this patient. I reviewed the vital signs, available nursing notes, past medical history, past surgical history, family history and social history. Vital Signs-Reviewed the patient's vital signs.     Pulse Oximetry Analysis -98% on room air    Cardiac Monitor:  Rate 81 bpm  Rhythm: Sinus rhythm     EKG interpretation: (Preliminary)  4:59 AM   Normal sinus rhythm, rate 73, normal ST segments, QTC is 460  EKG read by Ludwig Lopez MD      Records Reviewed: NURSING NOTES AND PREVIOUS MEDICAL RECORDS    Provider Notes (Medical Decision Making):   Patient returns with recurrent abdominal pain and vomiting. She is on medications to assess for this however it is mostly antiemetics. She been trying home Reglan and Zofran without much improvement. She was given IV medications here to help contribute for her other symptoms. Ultrasound was performed in order to look for biliary disease as well as cardiac contributions however those test did come back unremarkable (as anticipated). We see no evidence of biliary contribution or cardiac contribution to her pain and recurrent vomiting. CAT scan was repeated as she had pretty graphic amounts of emesis and dry heaving therefore esophageal disruption or tear or mesenteric rents were considered in her differential.  For this reason CT was repeated however we found no evidence of free air or new inflammatory response. This likely symptom from her very large hiatal hernia. It is possible GI services would consider endoscopy. Is very unlikely this represents any kind of blockage or obstruction from a missed polyp or adenomatous lesion in her abdomen. She had a endoscopy done as 2 years ago. I called asked the medicine team to consider bringing her for period of observation mostly just to control her symptoms. There looks to be no surgical blockage or infection however it may be difficult to control her symptoms without fine tuning down within a brief 24-hour hospital stay. She will be placed in a period of observation. No findings of urinary blockage obstruction or infection were found as contributions. Patient family contacts  Granddaughter's name is [facility]  Record [phone].     Daughter is Bernabe Kang  [phone]    Procedures:  Procedures    ED Course:   5:51 AM: Initial assessment performed. The patients presenting problems have been discussed, and they are in agreement with the care plan formulated and outlined with them. I have encouraged them to ask questions as they arise throughout their visit. CONSULT NOTE:   9:02 AM  Nisa Altman MD   spoke with Dr. Linda Horne  Specialty: Hospitalist  Discussed pt's hx, disposition, and available diagnostic and imaging results  over the telephone. Reviewed care plans. Consulting physician agrees with plans as outlined. .        Diagnosis and Disposition     Critical Care Time: 0 minutes    Core Measures:  For Hospitalized Patients:    1. Hospitalization Decision Time:  The decision to hospitalize the patient was made by Nisa Altman MD     at 6:15 AM on 7/1/2021    2. Aspirin: Aspirin was not given because the patient is a bleeding risk    9:15 AM  Patient is being admitted to the hospital by Dr. Abundio Ko. The results of their tests and reasons for their admission have been discussed with them and/or available family. They convey agreement and understanding for the need to be admitted and for their admission diagnosis. CONDITIONS ON ADMISSION:  Sepsis is not present at the time of admission. Deep Vein Thrombosis is not present at the time of admission. Thrombosis is not present at the time of admission. Urinary Tract Infection is not present at the time of admission. Pneumonia is not present at the time of admission. MRSA is not present at the time of admission. Wound infection is not present at the time of admission. Pressure Ulcer is not present at the time of admission. CLINICAL IMPRESSION:    1. Intractable vomiting with nausea, unspecified vomiting type    2. Large hiatal hernia    3.  Gastroesophageal reflux disease, unspecified whether esophagitis present        _______________________________    This note was partially transcribed via voice recognition software. Although efforts have been made to catch any discrepancies, it may contain sound alike words, grammatical errors, or nonsensical words.

## 2021-07-01 NOTE — H&P
History & Physical    Patient: Alison Jones MRN: [id number]  CSN: [id number]    YOB: 1945  Age: 78 y.o. Sex: female      DOA: 7/1/2021  Primary Care Provider:  Mamie Hadley MD      Assessment/Plan     Patient Active Problem List   Diagnosis Code    HTN (hypertension) I10    Diverticulosis of colon K57.30    Internal hemorrhoids K64.8    GERD (gastroesophageal reflux disease) K21.9    HLD (hyperlipidemia) W88.9    Diastolic CHF, chronic (HCC) I50.32    Proctitis K62.89    Hypoalbuminemia E88.09    Intractable vomiting R11.10    Hiatal hernia with GERD K21.9, K44.9    Hypertension I10    High cholesterol E78.00    Hyponatremia E87.1    Right lower quadrant abdominal pain R10.31       Admit to medical floor    Intractable nausea vomiting-  Unclear etiology, possibly secondary to large hiatal hernia. Antiemetics as needed. CT abdomen pelvis with no evidence of obstruction. Abdominal pain-  Her abdominal pain and. physical exam is disproportionate to CT findings. At this point will put her on clear liquids diet. Her WBC normal range lactic acid in normal range. General surgery consulted. Diarrhea-  Send stool for C. difficile, enteric bacteria panel, WBC. White count and lactic acid in normal range. Will hold onto antibiotics. She is afebrile. Hyponatremia-  Started on IV fluids, check urine sodium. Hypertension-  Continue with home medications, monitor blood pressure. GERD-  Continue with antacid medications. DVT prophylaxis with Lovenox      Estimated length of stay : 2 to 3 days    CC: Abdominal pain, nausea vomiting. HPI:     Alison Jones is a 78 y.o. female with hypertension, hyperlipidemia, hiatal hernia, GERD, breast cancer presents to ER with concerns of abdominal pain, nausea/vomiting. She has been having abdominal pain for the past few days. Has been having persistent nausea and vomiting.   She also reports that she had diarrhea for the past 2 days. She is a poor historian, in the ER she was noted to have intractable nausea and vomiting. She continued to have heaves. Her pain is located on the right side of the abdomen. In ER her CT of abdomen pelvis did not show any acute findings except for liver and right renal cyst.  Her labs relatively normal range, lipase in normal range. Given her intractable vomiting and significant abdominal pain she is being admitted. Past Medical History:   Diagnosis Date    Arthritis     Blood clot in vein 2004    left leg     [facility] [address]     Cancer ([facility] [address]     right breast    GERD (gastroesophageal reflux disease)     High cholesterol     Hypertension     Intractable vomiting 7/1/2021    Other ill-defined conditions(799.89)     high cholestrol    Vertigo        Past Surgical History:   Procedure Laterality Date    HX BREAST LUMPECTOMY      HX GI      colonosocpy    HX KNEE REPLACEMENT      HX ORTHOPAEDIC      total knee replacement bilateral    HX ORTHOPAEDIC      excision of cyst left hand    HX TUBAL LIGATION      HX TUMOR REMOVAL      NC ABDOMEN SURGERY PROC UNLISTED      removal of tumor abdomen       No family history on file. Social History     Socioeconomic History    Marital status:      Spouse name: Not on file    Number of children: Not on file    Years of education: Not on file    Highest education level: Not on file   Tobacco Use    Smoking status: Passive Smoke Exposure - Never Smoker    Smokeless tobacco: Never Used   Substance and Sexual Activity    Alcohol use: No    Drug use: No     Social Determinants of Health     Financial Resource Strain:     Difficulty of Paying Living Expenses:    Food Insecurity:     Worried About Running Out of Food in the Last Year:     [address] in the Last Year:    Transportation Needs:     Lack of Transportation (Medical):      Lack of Transportation (Non-Medical):    Physical Activity:     Days of Exercise per Week:     Minutes of Exercise per Session:    Stress:     Feeling of Stress :    Social Connections:     Frequency of Communication with Friends and Family:     Frequency of Social Gatherings with Friends and Family:     Attends [employer] Services:     Active Member of Clubs or Organizations:     Attends Club or Organization Meetings:     Marital Status:        Prior to Admission medications    Medication Sig Start Date End Date Taking? Authorizing Provider   ondansetron (ZOFRAN ODT) 4 mg disintegrating tablet Take 1 Tablet by mouth every eight (8) hours as needed for Nausea for up to 12 doses. 6/29/21   Rina Banda MD   ergocalciferol (VITAMIN D2) 50,000 unit capsule Take 50,000 Units by mouth every seven (7) days. Dr. Randi Pereira with [facility]  Indications: Vitamin D Deficiency    Provider, Historical   raNITIdine hcl 300 mg cap Take 1 Cap by mouth daily. Saman barclay with [facility]    Provider, Historical   metoprolol ta-hydrochlorothiaz (LOPRESSOR HCT) 100-50 mg per tablet Take 1 Tab by mouth two (2) times a day. Indications: hypertension    Provider, Historical   nystatin [facility]) powder Apply 1 g to affected area two (2) times a day. Indications: CUTANEOUS CANDIDIASIS, Diaper Rash    Provider, Historical   nystatin (MYCOSTATIN) powder Apply  to affected area two (2) times a day. 1/26/18   Malia Acosta MD   hydroCHLOROthiazide (HYDRODIURIL) 25 mg tablet Take 1 Tab by mouth daily. 1/26/18   Malia Acosta MD   spironolactone (ALDACTONE) 25 mg tablet Take 1 Tab by mouth daily. 1/26/18   Malia Acosta MD   sucralfate (CARAFATE) 1 gram tablet Take 1 Tab by mouth four (4) times daily. Patient not taking: Reported on 2/8/2018 1/1/17   Chani Johnson MD   lovastatin (MEVACOR) 40 mg tablet Take 40 mg by mouth nightly. Ghada Solitario MD   cyclobenzaprine (FLEXERIL) 10 mg tablet Take 10 mg by mouth three (3) times daily as needed for Muscle Spasm(s).     Other, MD Ghada   aspirin (ASPIRIN) 325 mg tablet Take 325 mg by mouth daily. Altaf, MD Ghada   famotidine (PEPCID) 20 mg tablet Take 20 mg by mouth two (2) times a day. Ghada Solitario MD       Allergies   Allergen Reactions    Amiloride Nausea and Vomiting    Amoxicillin Rash    Bactrim [Sulfamethoprim Ds] Unknown (comments)    Gabapentin Rash    Ibuprofen Unknown (comments)    Zestoretic [Lisinopril-Hydrochlorothiazide] Unknown (comments)       Review of Systems  Gen: No fever, chills, malaise, weight loss/gain. Heent: No headache, rhinorrhea, epistaxis, ear pain, hearing loss, sinus pain, neck pain/stiffness, sore throat. Heart: No chest pain, palpitations, PARTIDA, pnd, or orthopnea. Resp: No cough, hemoptysis, wheezing and shortness of breath. GI: see above  : No urinary obstruction, dysuria or hematuria. Derm: No rash, new skin lesion or pruritis. Musc/skeletal: no bone or joint complains. Vasc: No edema, cyanosis or claudication. Endo: No heat/cold intolerance, no polyuria,polydipsia or polyphagia. Neuro: No unilateral weakness, numbness, tingling. No seizures. Heme: No easy bruising or bleeding. Physical Exam:     Physical Exam:  Visit Vitals  BP (!) 177/79   Pulse 61   Temp 97.8 °F (36.6 °C)   Resp 20   Ht 4' 11\" (1.499 m)   Wt 63.5 kg (140 lb)   SpO2 100%   BMI 28.28 kg/m²      O2 Device: None (Room air)    Temp (24hrs), Av °F (36.7 °C), Min:97.8 °F (36.6 °C), Max:98.2 °F (36.8 °C)    701 - 1900  In: 50 [I.V.:50]  Out: 350 [Urine:150]   1901 -  07  In: 1000 [I.V.:1000]  Out: -     General:  Awake, cooperative, no distress. Head:  Normocephalic, without obvious abnormality, atraumatic. Eyes:  Conjunctivae/corneas clear, sclera anicteric, PERRL, EOMs intact. Nose: Nares normal. No drainage or sinus tenderness. Throat: Lips, mucosa, and tongue normal.    Neck: Supple, symmetrical, trachea midline, no adenopathy. Lungs:   Clear to auscultation bilaterally.        Heart: S1, S2, no murmur, click, rub or gallop. Abdomen: Soft, right side abdomen-tender. Bowel sounds normal. No masses,  No organomegaly. Extremities: Extremities normal, atraumatic, no cyanosis or edema. Capillary refill normal.   Pulses: 2+ and symmetric all extremities. Skin: Skin color pink, turgor normal. No rashes or lesions   Neurologic: CNII-XII intact. No focal motor or sensory deficit. Labs Reviewed:    CMP:   Lab Results   Component Value Date/Time     (L) 07/01/2021 03:45 AM    K 4.7 07/01/2021 03:45 AM    CL 96 (L) 07/01/2021 03:45 AM    CO2 26 07/01/2021 03:45 AM    AGAP 8 07/01/2021 03:45 AM    GLU 98 07/01/2021 03:45 AM    BUN 14 07/01/2021 03:45 AM    CREA 1.18 07/01/2021 03:45 AM    GFRAA 54 (L) 07/01/2021 03:45 AM    GFRNA 45 (L) 07/01/2021 03:45 AM    CA 9.4 07/01/2021 03:45 AM    ALB 3.4 07/01/2021 03:45 AM    TP 7.4 07/01/2021 03:45 AM    GLOB 4.0 07/01/2021 03:45 AM    AGRAT 0.9 07/01/2021 03:45 AM    ALT 17 07/01/2021 03:45 AM     CBC:   Lab Results   Component Value Date/Time    WBC 7.9 07/01/2021 03:45 AM    HGB 13.5 07/01/2021 03:45 AM    HCT 42.2 07/01/2021 03:45 AM     07/01/2021 03:45 AM     All Cardiac Markers in the last 24 hours:   Lab Results   Component Value Date/Time     (H) 07/01/2021 03:45 AM    CKMB 5.1 (H) 07/01/2021 03:45 AM    CKND1 1.3 07/01/2021 03:45 AM    TROIQ <0.02 07/01/2021 03:45 AM     Pancreatic Markers:   Lab Results   Component Value Date/Time    LPSE 203 07/01/2021 03:45 AM         Procedures/imaging: see electronic medical records for all procedures/Xrays and details which were not copied into this note but were reviewed prior to creation of Plan    Please note that this dictation was completed with Resoomay, the computer voice recognition software. Quite often unanticipated grammatical, syntax, homophones, and other interpretive errors are inadvertently transcribed by the computer software. Please disregard these errors.   Please excuse any errors that have escaped final proofreading.         CC: Angelina Stuart MD

## 2021-07-01 NOTE — ED NOTES
TRANSFER - OUT REPORT:    Verbal report given to Frannie(name) on Ekaterina Bolanos  being transferred to medical (unit) for routine progression of care       Report consisted of patients Situation, Background, Assessment and   Recommendations(SBAR). Information from the following report(s) ED Summary was reviewed with the receiving nurse. Lines:   Peripheral IV 07/01/21 Right Antecubital (Active)   Site Assessment Clean, dry, & intact 07/01/21 0355   Phlebitis Assessment 0 07/01/21 0355   Infiltration Assessment 0 07/01/21 0355   Dressing Status Clean, dry, & intact 07/01/21 0355   Dressing Type Transparent 07/01/21 0355        Opportunity for questions and clarification was provided.       Patient transported with:   Registered Nurse

## 2021-07-01 NOTE — ED TRIAGE NOTES
Pt arrives ambulatory to triage with c/o ABD pain and blood in the stools that began tonight. Pt states the blood is on the tissue when she wipes. She states is is bright red. She also c/o NV. Per family member she has been seen 2x in the past 24 hours for the same complaint. She also states that she was seen once last week for the same complaint.

## 2021-07-01 NOTE — PROGRESS NOTES
1032-Bedside and Verbal shift change report given to Harmony Li (oncoming nurse) by Dano Cooney (offgoing nurse). Report included the following information SBAR, Kardex, Intake/Output, and MAR.    1100-Patient arrived on the unit. Patient A/OX4. Patient in bed resting quietly, no complaints at this time. 1700-Daughter and Iram Crumb that is local added to contact information. Informed patient that a stool and urine sample was needed. Patient states she already provided a stool sample in the ED.     1625-Patient complaint of abdominal pain. Administered Morphine 1 MG.     1930-Bedside and Verbal shift change report given to Oscar Sepulveda RN (oncoming nurse) by Harmony Li (offgoing nurse). Report included the following information SBAR, Kardex, Intake/Output, and MAR.

## 2021-07-01 NOTE — PROGRESS NOTES
Problem: Falls - Risk of  Goal: *Absence of Falls  Description: Document Kirk Crowleyandie Fall Risk and appropriate interventions in the flowsheet.   Outcome: Progressing Towards Goal  Note: Fall Risk Interventions:                                Problem: Patient Education: Go to Patient Education Activity  Goal: Patient/Family Education  Outcome: Progressing Towards Goal

## 2021-07-01 NOTE — ROUTINE PROCESS
TRANSFER - IN REPORT:    Verbal report received from IFTIKHAR SantanaRN(name) on Christel Bernard  being received from ED(unit) for routine progression of care      Report consisted of patients Situation, Background, Assessment and   Recommendations(SBAR). Information from the following report(s) SBAR, Kardex, Intake/Output and MAR was reviewed with the receiving nurse. Opportunity for questions and clarification was provided. Assessment completed upon patients arrival to unit and care assumed.

## 2021-07-01 NOTE — PROGRESS NOTES
Pharmacy Dosing Services: pepcid        Indication: Symptomatic GERD   Previous Regimen Pepcid 20 mg PO BID   Serum Creatinine Lab Results   Component Value Date/Time    Creatinine 1.18 07/01/2021 03:45 AM    Creatinine, POC 2.6 (H) 01/15/2018 08:44 PM      Creatinine Clearance Estimated Creatinine Clearance: 36.1 mL/min (based on SCr of 1.18 mg/dL).    BUN Lab Results   Component Value Date/Time    BUN 14 07/01/2021 03:45 AM    BUN, POC 42 (H) 01/15/2018 08:44 PM       Dose adjusted based on CrCl and Indication to Pepcid 20 mg PO Daily  Plan:  Continue to monitor

## 2021-07-01 NOTE — Clinical Note
Patient Class[de-identified] OBSERVATION [540]   Type of Bed: Medical [8]   Reason for Observation: Intractable vomiting   Admitting Diagnosis: Intractable vomiting [[id number]]   Admitting Diagnosis: Hiatal hernia with GERD [[id number]]   Admitting Physician: Neftali Ramirez [[id number]]   Attending Physician: Neftali Ramirez [[id number]]

## 2021-07-01 NOTE — PROGRESS NOTES
Reason for Admission:  Abdominal pain and rectal bleeding                     RUR Score:   N/A                  Plan for utilizing home health:    TBD      PCP: First and Last name:  Luan Medina MD     Name of Practice:    Are you a current patient: Yes/No:    Approximate date of last visit:    Can you participate in a virtual visit with your PCP:                     Current Advanced Directive/Advance Care Plan: Prior      Healthcare Decision Maker:   Click here to complete [address] including selection of the Healthcare Decision Maker Relationship (ie \"Primary\")                             Transition of Care Plan:   Home with physician follow up vs [place] and physician follow up                    Chart reviewed. Per physician documentation Imani Diaz is a 78 y.o. female with PMHX of hypertension, high cholesterol, distant history of breast cancer with distant history of DVT, reflux with very large hiatal hernia who presents to the emergency department C/O abdominal pain and vomiting. Patient was brought by her family back to the emergency department for worsening abdominal pain and vomiting. I spoke with the family on the phone earlier where they said \"she was stuck on the toilet\". They were concerned about ability to care for at home with persistent diarrhea but I told them without a dangerous infectious problems and would not have ability for admission for such a problem. After she arrived she had intractable vomiting despite home medications of Zofran and Reglan. She was somewhat tearful and anxious with some of the symptoms but clearly had pain and tenderness to the epigastric and right upper quadrant. She had previous CAT scan what was unremarkable but no abdominal ultrasound. We also need to consider cardiac as a possible source.   She is got a history of multiple orthopedic and breast lumpectomies but a vague history of some type of abdominal tumor which was removed and years in a distant past.  Both she and her family deny any fevers or chills or urinary color changes other than there is diminution in her urinary output. Hemalatha Neighbours \"    CM met with pt at bedside and she has indicated she has a RW at home. Pt is  and she has indicated her  will assist as needed. Please encourage ambulation as appropriate to assist with determing a transition of care. Anticipate pt will transition home with physician follow up vs [facility] and physician follow up. CM to continue to follow and assist.    Care Management Interventions  Mode of Transport at Discharge:  Other (see comment) (Family )  Transition of Care Consult (CM Consult): Discharge Planning  Health Maintenance Reviewed: Yes  Current Support Network: Lives with Spouse, Family Lives Nearby  The Plan for Transition of Care is Related to the Following Treatment Goals : Home with physician follow up vs [facility] and physician follow up   Discharge Location  Discharge Placement: Home with family assistance (vs HH)

## 2021-07-02 LAB
ALBUMIN SERPL-MCNC: 3.3 G/DL (ref 3.4–5)
ALBUMIN/GLOB SERPL: 0.9 {RATIO} (ref 0.8–1.7)
ALP SERPL-CCNC: 73 U/L (ref 45–117)
ALT SERPL-CCNC: 14 U/L (ref 13–56)
ANION GAP SERPL CALC-SCNC: 8 MMOL/L (ref 3–18)
AST SERPL-CCNC: 21 U/L (ref 10–38)
BASOPHILS # BLD: 0 K/UL (ref 0–0.1)
BASOPHILS NFR BLD: 0 % (ref 0–2)
BILIRUB SERPL-MCNC: 0.8 MG/DL (ref 0.2–1)
BUN SERPL-MCNC: 9 MG/DL (ref 7–18)
BUN/CREAT SERPL: 9 (ref 12–20)
CALCIUM SERPL-MCNC: 9.3 MG/DL (ref 8.5–10.1)
CHLORIDE SERPL-SCNC: 101 MMOL/L (ref 100–111)
CO2 SERPL-SCNC: 26 MMOL/L (ref 21–32)
CREAT SERPL-MCNC: 0.97 MG/DL (ref 0.6–1.3)
DIFFERENTIAL METHOD BLD: ABNORMAL
EOSINOPHIL # BLD: 0.1 K/UL (ref 0–0.4)
EOSINOPHIL NFR BLD: 1 % (ref 0–5)
ERYTHROCYTE [DISTWIDTH] IN BLOOD BY AUTOMATED COUNT: 13.1 % (ref 11.6–14.5)
GLOBULIN SER CALC-MCNC: 3.6 G/DL (ref 2–4)
GLUCOSE SERPL-MCNC: 84 MG/DL (ref 74–99)
HCT VFR BLD AUTO: 40.4 % (ref 35–45)
HGB BLD-MCNC: 13.5 G/DL (ref 12–16)
LYMPHOCYTES # BLD: 1.9 K/UL (ref 0.9–3.6)
LYMPHOCYTES NFR BLD: 20 % (ref 21–52)
MCH RBC QN AUTO: 29.5 PG (ref 24–34)
MCHC RBC AUTO-ENTMCNC: 33.4 G/DL (ref 31–37)
MCV RBC AUTO: 88.4 FL (ref 74–97)
MONOCYTES # BLD: 0.8 K/UL (ref 0.05–1.2)
MONOCYTES NFR BLD: 8 % (ref 3–10)
NEUTS SEG # BLD: 6.9 K/UL (ref 1.8–8)
NEUTS SEG NFR BLD: 71 % (ref 40–73)
PLATELET # BLD AUTO: 260 K/UL (ref 135–420)
PMV BLD AUTO: 10.1 FL (ref 9.2–11.8)
POTASSIUM SERPL-SCNC: 3.9 MMOL/L (ref 3.5–5.5)
PROT SERPL-MCNC: 6.9 G/DL (ref 6.4–8.2)
RBC # BLD AUTO: 4.57 M/UL (ref 4.2–5.3)
SODIUM SERPL-SCNC: 135 MMOL/L (ref 136–145)
SODIUM UR-SCNC: 74 MMOL/L (ref 20–110)
WBC # BLD AUTO: 9.7 K/UL (ref 4.6–13.2)

## 2021-07-02 PROCEDURE — 74011250637 HC RX REV CODE- 250/637: Performed by: HOSPITALIST

## 2021-07-02 PROCEDURE — 36415 COLL VENOUS BLD VENIPUNCTURE: CPT

## 2021-07-02 PROCEDURE — 99218 HC RM OBSERVATION: CPT

## 2021-07-02 PROCEDURE — 74011250636 HC RX REV CODE- 250/636: Performed by: HOSPITALIST

## 2021-07-02 PROCEDURE — 96376 TX/PRO/DX INJ SAME DRUG ADON: CPT

## 2021-07-02 PROCEDURE — 80307 DRUG TEST PRSMV CHEM ANLYZR: CPT

## 2021-07-02 PROCEDURE — 74011000250 HC RX REV CODE- 250: Performed by: HOSPITALIST

## 2021-07-02 PROCEDURE — 80053 COMPREHEN METABOLIC PANEL: CPT

## 2021-07-02 PROCEDURE — 85025 COMPLETE CBC W/AUTO DIFF WBC: CPT

## 2021-07-02 RX ADMIN — METOPROLOL TARTRATE 100 MG: 50 TABLET, FILM COATED ORAL at 21:54

## 2021-07-02 RX ADMIN — METOPROLOL TARTRATE 100 MG: 50 TABLET, FILM COATED ORAL at 08:39

## 2021-07-02 RX ADMIN — SPIRONOLACTONE 25 MG: 25 TABLET ORAL at 08:39

## 2021-07-02 RX ADMIN — ATORVASTATIN CALCIUM 10 MG: 10 TABLET, FILM COATED ORAL at 21:54

## 2021-07-02 RX ADMIN — MORPHINE SULFATE 1 MG: 2 INJECTION, SOLUTION INTRAMUSCULAR; INTRAVENOUS at 08:39

## 2021-07-02 RX ADMIN — FAMOTIDINE 20 MG: 20 TABLET, FILM COATED ORAL at 08:39

## 2021-07-02 RX ADMIN — ASPIRIN 325 MG ORAL TABLET 325 MG: 325 PILL ORAL at 08:39

## 2021-07-02 RX ADMIN — ALUMINUM HYDROXIDE, MAGNESIUM HYDROXIDE, AND SIMETHICONE 40 ML: 200; 200; 20 SUSPENSION ORAL at 14:23

## 2021-07-02 RX ADMIN — HYDROCHLOROTHIAZIDE 25 MG: 25 TABLET ORAL at 08:39

## 2021-07-02 NOTE — PROGRESS NOTES
Hospitalist Progress Note-critical care note     Patient: Haylie Starks MRN: [id number]  CSN: [id number]    YOB: 1945  Age: 78 y.o. Sex: female    DOA: 7/1/2021 LOS:  LOS: 0 days            Chief complaint: Intractable vomiting, hernia, hypertension, hyponatremia, abdominal pain GERD hypertension    Assessment/Plan         Hospital Problems  Date Reviewed: 1/15/2018        Codes Class Noted POA    * (Principal) Intractable vomiting ICD-10-CM: R11.10  ICD-9-CM: 536.2  7/1/2021 Unknown        Hiatal hernia with GERD ICD-10-CM: K21.9, K44.9  ICD-9-CM: 530.81, 553.3  7/1/2021 Unknown        Hypertension ICD-10-CM: I10  ICD-9-CM: 401.9  Unknown Yes        High cholesterol ICD-10-CM: E78.00  ICD-9-CM: 272.0  Unknown Yes        Hyponatremia ICD-10-CM: E87.1  ICD-9-CM: 276.1  7/1/2021 Unknown        Right lower quadrant abdominal pain ICD-10-CM: R10.31  ICD-9-CM: 789.03  7/1/2021 Unknown        GERD (gastroesophageal reflux disease) ICD-10-CM: K21.9  ICD-9-CM: 530.81  1/15/2018 Yes        HTN (hypertension) ICD-10-CM: I10  ICD-9-CM: 401.9  11/4/2013 Yes               Intractable nausea vomiting-  Unclear etiology, possibly secondary to large hiatal hernia. CT abdomen pelvis with no evidence of obstruction. Continue symptom treatment  Cardiac diet     Abdominal pain-  Her abdominal pain and. physical exam is disproportionate to CT findings. General surgery consulted. Dr. Gillespie Rising follow-up with patient  We will give PPI. GI cocktail if she can tolerate the p.o. Cardiac enzymes negative       Diarrhea-loose stool this morning  Send stool for C. difficile, enteric bacteria panel, WBC. White count and lactic acid in normal range. Will hold onto antibiotics. She is afebrile.     Hyponatremia-improving   Will decrease infusion rate      Hypertension-continue with home medications, monitor blood pressure.     GERD-  Continue with antacid medications.     Subjective: still in pain, one time stool Disposition :tbd,   Review of systems:    General: No fevers or chills. Cardiovascular: No chest pain or pressure. No palpitations. Pulmonary: No shortness of breath. Gastrointestinal: + nausea, no  vomiting. Abdomen pain     Vital signs/Intake and Output:  Visit Vitals  BP (!) 153/87 (BP 1 Location: Left upper arm, BP Patient Position: At rest)   Pulse 61   Temp 97.9 °F (36.6 °C)   Resp 16   Ht 4' 11\" (1.499 m)   Wt 67.7 kg (149 lb 3.2 oz)   SpO2 98%   BMI 30.13 kg/m²     Current Shift:  No intake/output data recorded. Last three shifts:  06/30 1901 - 07/02 0700  In: 1708.3 [I.V.:1708.3]  Out: 1450 [Urine:1250]    Physical Exam:  General: WD, WN. Alert, cooperative, no acute distress    HEENT: NC, Atraumatic. PERRLA, anicteric sclerae. Lungs: CTA Bilaterally. No Wheezing/Rhonchi/Rales. Heart:  Regular  rhythm,  No murmur, No Rubs, No Gallops  Abdomen: Soft, Non distended, + tender. +Bowel sounds,   Extremities: No c/c/e  Psych:   Not anxious or agitated. Neurologic:  No acute neurological deficit.              Labs: Results:       Chemistry Recent Labs     07/02/21 0430 07/01/21 0345 06/30/21  1706   GLU 84 98 84   * 130* 135*   K 3.9 4.7 4.8    96* 102   CO2 26 26 27   BUN 9 14 17   CREA 0.97 1.18 1.25   CA 9.3 9.4 9.3   AGAP 8 8 6   BUCR 9* 12 14   AP 73 76 79   TP 6.9 7.4 7.5   ALB 3.3* 3.4 3.4   GLOB 3.6 4.0 4.1*   AGRAT 0.9 0.9 0.8      CBC w/Diff Recent Labs     07/02/21 0430 07/01/21  0345 06/30/21  1706   WBC 9.7 7.9 7.8   RBC 4.57 4.66 4.75   HGB 13.5 13.5 14.0   HCT 40.4 42.2 43.2    248 258   GRANS 71 67 59   LYMPH 20* 25 32   EOS 1 1 2      Cardiac Enzymes Recent Labs     07/01/21  0345 06/29/21  1525   * 334*   CKND1 1.3 1.8      Coagulation Recent Labs     06/29/21  1525   PTP 13.6   INR 1.1   APTT 25.1       Lipid Panel No results found for: CHOL, CHOLPOCT, CHOLX, CHLST, CHOLV, 064778, HDL, HDLP, LDL, LDLC, DLDLP, 749221, VLDLC, VLDL, TGLX, TRIGL, TRIGP, TGLPOCT, CHHD, CHHDX   BNP No results for input(s): BNPP in the last 72 hours. Liver Enzymes Recent Labs     07/02/21  0430   TP 6.9   ALB 3.3*   AP 73      Thyroid Studies No results found for: T4, T3U, TSH, TSHEXT     Procedures/imaging: see electronic medical records for all procedures/Xrays and details which were not copied into this note but were reviewed prior to creation of Plan    CT ABD PELV W CONT    Result Date: 7/1/2021  EXAM: CT of the abdomen and pelvis INDICATION: Nausea, vomiting, blood in stool. COMPARISON: CT June 29, 2021, 1/18/2018 TECHNIQUE: Axial CT imaging of the abdomen and pelvis was performed with intravenous contrast. Multiplanar reformats were generated. One or more dose reduction techniques were used on this CT: automated exposure control, adjustment of the mAs and/or kVp according to patient size, and iterative reconstruction techniques. The specific techniques used on this CT exam have been documented in the patient's electronic medical record. Digital Imaging and Communications in Medicine (DICOM) format image data are available to nonaffiliated external healthcare facilities or entities on a secure, media free, reciprocally searchable basis with patient authorization for at least a 12-month period after this study. _______________ FINDINGS: LOWER CHEST: Mild bibasilar atelectasis without evidence of alveolar consolidation or pleural effusion. Cardiac size is within normal limits. Multivessel coronary arterial atherosclerotic vascular calcifications. LIVER, BILIARY: Redemonstration of multiple hepatic hypodensities, either too small to accurately characterize, or in keeping with simple cysts. No biliary dilation. Gallbladder is unremarkable. PANCREAS: Normal. SPLEEN: Normal. ADRENALS: Normal. KIDNEYS/URETERS/BLADDER: Symmetric renal enhancement without hydronephrosis. Bilateral renal hypodensities, either too small to accurately characterize are in keeping with simple cysts.  These appear unchanged from prior. No urolithiasis. Urinary bladder unremarkable. PELVIC ORGANS: Several calcified and noncalcified uterine leiomyomata. VASCULATURE: Atherosclerosis. No evidence of aneurysmal dilatation. LYMPH NODES: No enlarged lymph nodes. GASTROINTESTINAL TRACT: Large hiatus hernia redemonstrated. No abnormal bowel wall thickening or dilatation. Prior colonic resection and reanastomosis within the right upper quadrant of the abdomen. BONES: No acute or aggressive osseous abnormalities identified. Multilevel spondylosis throughout the lower thoracic and lumbar spine. Grade 2 anterolisthesis L4 on L5. Advanced, end stage osteoarthritis of the left hip joint; all appearing unchanged. OTHER: None. _______________     1. Redemonstration of a large hiatus hernia without clearly acute intra-abdominal or pelvic abnormality demonstrated on today's examination.   > No abnormal bowel wall thickening or inflammatory change appreciated. 2. Unchanged small hepatic and renal cysts. 3. Grade 2 anterolisthesis L4 on L5; advanced, end stage osteoarthritis left hip joint. CT ABD PELV W CONT    Result Date: 6/29/2021  EXAM: CT of the abdomen and pelvis INDICATION: 16-year-old patient with abdominal pain COMPARISON: CT performed 1/18/2018 TECHNIQUE: Axial CT imaging of the abdomen and pelvis was performed with intravenous contrast. Multiplanar reformats were generated. One or more dose reduction techniques were used on this CT: automated exposure control, adjustment of the mAs and/or kVp according to patient size, and iterative reconstruction techniques. The specific techniques used on this CT exam have been documented in the patient's electronic medical record.   Digital Imaging and Communications in Medicine (DICOM) format image data are available to nonaffiliated external healthcare facilities or entities on a secure, media free, reciprocally searchable basis with patient authorization for at least a 12-month period after this study. _______________ FINDINGS: LOWER CHEST: Mild basilar changes of atelectasis without alveolar consolidation or pleural effusion. Normal cardiac size. No pericardial effusion. LIVER, BILIARY: Hepatic parenchymal enhancement is uniform. There are scattered hepatic hypodensities redemonstrated which are either too small accurately characterize or are in keeping with simple cysts. No biliary dilation. Gallbladder is unremarkable. PANCREAS: Normal. SPLEEN: Normal. ADRENALS: Normal. KIDNEYS/URETERS/BLADDER: Symmetric renal enhancement. No hydronephrosis. Multiple bilateral renal hypodensities, some too small to accurately characterize, with others representing simple cysts which require no further dedicated imaging follow-up. Urinary bladder is unremarkable. PELVIC ORGANS: Several calcified and noncalcified uterine leiomyomata. Adnexa appear within normal limits. VASCULATURE: Diffuse atherosclerosis. No evidence of aneurysmal dilatation. LYMPH NODES: No enlarged lymph nodes. GASTROINTESTINAL TRACT: There is a large hiatus hernia. No evidence of perigastric inflammatory change or stranding. Gastroduodenal junction and is below the level of the diaphragm. Evidence of prior colonic resection and reanastomosis. No morphology of bowel obstruction. No free intraperitoneal gas. Normal appendix. BONES: Moderate anterolisthesis of L4 on L5. Multilevel lower thoracic and lumbar spondylosis as well as lower lumbar predominant facet joint osteoarthritis, as previously. 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 OTHER: None. _______________     1. No focal bowel wall thickening or inflammatory change. No morphology of bowel obstruction. Normal appendix. 2. Large hiatus hernia. 3. Mild left basilar atelectasis with otherwise clear lung bases.  4. Grade 2 anterolisthesis L4 on L5 with advanced lower lumbar predominant spondylosis and facet joint osteoarthritis. 5. Advanced, end stage osteoarthritis of the left hip joint    US ABD LTD    Result Date: 7/1/2021  EXAM: Right upper quadrant limited abdominal ultrasound INDICATION: Abdominal pain and rectal bleeding. COMPARISON: CT abdomen pelvis 6/29/2021. _______________ FINDINGS: LIVER: Normal in echotexture. Several small cysts the largest 9 mm, no new focal hepatic mass. Normal direction of blood flow in the portal vein. BILIARY SYSTEM: No intrahepatic biliary dilatation. Common bile duct is normal in caliber measuring 0.4 cm. GALLBLADDER: No gallstones or gallbladder wall thickening. RIGHT KIDNEY: 10.2 cm in length. No hydronephrosis or renal mass. No visible calculi. Several small benign-appearing cysts, the largest 1.5 cm. PANCREAS: Head and body are unremarkable in appearance though the tail is obscured by overlying bowel gas. IVC: Visualized portions are unremarkable in appearance. OTHER: No free intraperitoneal fluid. _______________     1. Unremarkable sonographic exam of the gallbladder, with no new acute findings. 2. Stable small benign-appearing hepatic and right renal cysts.        Alexandra Jimenes MD

## 2021-07-02 NOTE — PROGRESS NOTES
Pt is  and she has indicated her  will assist as needed. Please encourage ambulation as appropriate to assist with determing a transition of care. Anticipate pt will transition home with physician follow up when medically stable. CM to continue to follow and assist.    Care Management Interventions  Mode of Transport at Discharge:  Other (see comment) (Family )  Transition of Care Consult (CM Consult): Discharge Planning  Health Maintenance Reviewed: Yes  Current Support Network: Lives with Spouse, Family Lives Nearby  The Plan for Transition of Care is Related to the Following Treatment Goals : Home with physician follow up   Discharge Location  Discharge Placement: Home with family assistance

## 2021-07-02 NOTE — ROUTINE PROCESS
Bedside and Verbal shift change report given to Gaston Poe (oncoming nurse) by Radha Pacheco nurse). Report included the following information SBAR, Kardex and MAR.

## 2021-07-02 NOTE — PROGRESS NOTES
Bedside and Verbal shift change report given to DARY Langford RN (oncoming nurse) by Mehrdad Cosby RN (offgoing nurse). Report included the following information SBAR, Kardex, Intake/Output, and MAR. Patient A/OX4. Patient in bed resting quietly no complaints at this time. 0840-Patient requested something for pain, administered Morphine 1 MG Pain 10/10.    1934-Bedside and Verbal shift change report given to Storm Mccrary RN (oncoming nurse) by Mami Fajardo (offgoing nurse). Report included the following information SBAR, Kardex, Intake/Output, and MAR.

## 2021-07-02 NOTE — CONSULTS
Pt seen and evaluted with Dr. Kevin Mckinney (late entry)    No surgical etiology of pain identified. Chart reviewed. Full consult to follow.   Discussed with Pt and Dr. Kevin Mckinney

## 2021-07-02 NOTE — PROGRESS NOTES
Patient continued to have watery stools x 1, but unable to send to lab due to contaminated specimen. Patient denied pain or discomfort. Patient Vitals for the past 8 hrs:   Temp Pulse Resp BP SpO2   07/02/21 0758 97.9 °F (36.6 °C) (!) 59 17 (!) 161/85 99 %   07/02/21 0304 97.9 °F (36.6 °C) 61 16 (!) 153/87 98 %   07/02/21 0104 98 °F (36.7 °C) 60 17 125/73 100 %         0730 - Bedside and Verbal shift change report given to SUPRIYA Langford RN (oncoming nurse) by Bell Zamora (offgoing nurse). Report included the following information SBAR, Kardex, Intake/Output and MAR.

## 2021-07-02 NOTE — PROGRESS NOTES
Problem: Falls - Risk of  Goal: *Absence of Falls  Description: Document Nupur Malagon Fall Risk and appropriate interventions in the flowsheet.   Outcome: Progressing Towards Goal  Note: Fall Risk Interventions:            Medication Interventions: Teach patient to arise slowly                   Problem: Patient Education: Go to Patient Education Activity  Goal: Patient/Family Education  Outcome: Progressing Towards Goal

## 2021-07-03 LAB
ALBUMIN SERPL-MCNC: 3 G/DL (ref 3.4–5)
ALBUMIN/GLOB SERPL: 0.8 {RATIO} (ref 0.8–1.7)
ALP SERPL-CCNC: 64 U/L (ref 45–117)
ALT SERPL-CCNC: 14 U/L (ref 13–56)
AMPHET UR QL SCN: NEGATIVE
ANION GAP SERPL CALC-SCNC: 8 MMOL/L (ref 3–18)
AST SERPL-CCNC: 21 U/L (ref 10–38)
BARBITURATES UR QL SCN: NEGATIVE
BASOPHILS # BLD: 0 K/UL (ref 0–0.1)
BASOPHILS NFR BLD: 0 % (ref 0–2)
BENZODIAZ UR QL: NEGATIVE
BILIRUB SERPL-MCNC: 0.6 MG/DL (ref 0.2–1)
BUN SERPL-MCNC: 12 MG/DL (ref 7–18)
BUN/CREAT SERPL: 11 (ref 12–20)
CALCIUM SERPL-MCNC: 8.9 MG/DL (ref 8.5–10.1)
CANNABINOIDS UR QL SCN: NEGATIVE
CHLORIDE SERPL-SCNC: 104 MMOL/L (ref 100–111)
CO2 SERPL-SCNC: 26 MMOL/L (ref 21–32)
COCAINE UR QL SCN: NEGATIVE
CREAT SERPL-MCNC: 1.05 MG/DL (ref 0.6–1.3)
DIFFERENTIAL METHOD BLD: NORMAL
EOSINOPHIL # BLD: 0.1 K/UL (ref 0–0.4)
EOSINOPHIL NFR BLD: 2 % (ref 0–5)
ERYTHROCYTE [DISTWIDTH] IN BLOOD BY AUTOMATED COUNT: 13.3 % (ref 11.6–14.5)
GLOBULIN SER CALC-MCNC: 3.6 G/DL (ref 2–4)
GLUCOSE SERPL-MCNC: 78 MG/DL (ref 74–99)
HCT VFR BLD AUTO: 40 % (ref 35–45)
HDSCOM,HDSCOM: ABNORMAL
HGB BLD-MCNC: 13.2 G/DL (ref 12–16)
LYMPHOCYTES # BLD: 2.2 K/UL (ref 0.9–3.6)
LYMPHOCYTES NFR BLD: 27 % (ref 21–52)
MCH RBC QN AUTO: 29.6 PG (ref 24–34)
MCHC RBC AUTO-ENTMCNC: 33 G/DL (ref 31–37)
MCV RBC AUTO: 89.7 FL (ref 74–97)
METHADONE UR QL: NEGATIVE
MONOCYTES # BLD: 0.7 K/UL (ref 0.05–1.2)
MONOCYTES NFR BLD: 8 % (ref 3–10)
NEUTS SEG # BLD: 5.1 K/UL (ref 1.8–8)
NEUTS SEG NFR BLD: 63 % (ref 40–73)
OPIATES UR QL: POSITIVE
PCP UR QL: NEGATIVE
PLATELET # BLD AUTO: 248 K/UL (ref 135–420)
PMV BLD AUTO: 9.8 FL (ref 9.2–11.8)
POTASSIUM SERPL-SCNC: 3.9 MMOL/L (ref 3.5–5.5)
PROT SERPL-MCNC: 6.6 G/DL (ref 6.4–8.2)
RBC # BLD AUTO: 4.46 M/UL (ref 4.2–5.3)
SODIUM SERPL-SCNC: 138 MMOL/L (ref 136–145)
WBC # BLD AUTO: 8.1 K/UL (ref 4.6–13.2)

## 2021-07-03 PROCEDURE — 74011250636 HC RX REV CODE- 250/636: Performed by: HOSPITALIST

## 2021-07-03 PROCEDURE — 80053 COMPREHEN METABOLIC PANEL: CPT

## 2021-07-03 PROCEDURE — 99218 HC RM OBSERVATION: CPT

## 2021-07-03 PROCEDURE — 96375 TX/PRO/DX INJ NEW DRUG ADDON: CPT

## 2021-07-03 PROCEDURE — 85025 COMPLETE CBC W/AUTO DIFF WBC: CPT

## 2021-07-03 PROCEDURE — 74011250637 HC RX REV CODE- 250/637: Performed by: HOSPITALIST

## 2021-07-03 PROCEDURE — 74011000250 HC RX REV CODE- 250: Performed by: HOSPITALIST

## 2021-07-03 PROCEDURE — 36415 COLL VENOUS BLD VENIPUNCTURE: CPT

## 2021-07-03 PROCEDURE — 96372 THER/PROPH/DIAG INJ SC/IM: CPT

## 2021-07-03 PROCEDURE — C9113 INJ PANTOPRAZOLE SODIUM, VIA: HCPCS | Performed by: HOSPITALIST

## 2021-07-03 PROCEDURE — 96376 TX/PRO/DX INJ SAME DRUG ADON: CPT

## 2021-07-03 RX ADMIN — ASPIRIN 325 MG ORAL TABLET 325 MG: 325 PILL ORAL at 10:08

## 2021-07-03 RX ADMIN — ENOXAPARIN SODIUM 40 MG: 40 INJECTION SUBCUTANEOUS at 18:36

## 2021-07-03 RX ADMIN — SPIRONOLACTONE 25 MG: 25 TABLET ORAL at 10:08

## 2021-07-03 RX ADMIN — MORPHINE SULFATE 1 MG: 2 INJECTION, SOLUTION INTRAMUSCULAR; INTRAVENOUS at 20:41

## 2021-07-03 RX ADMIN — ATORVASTATIN CALCIUM 10 MG: 10 TABLET, FILM COATED ORAL at 21:22

## 2021-07-03 RX ADMIN — MORPHINE SULFATE 1 MG: 2 INJECTION, SOLUTION INTRAMUSCULAR; INTRAVENOUS at 09:20

## 2021-07-03 RX ADMIN — SODIUM CHLORIDE 40 MG: 9 INJECTION, SOLUTION INTRAMUSCULAR; INTRAVENOUS; SUBCUTANEOUS at 21:22

## 2021-07-03 RX ADMIN — HYDROCHLOROTHIAZIDE 25 MG: 25 TABLET ORAL at 10:08

## 2021-07-03 RX ADMIN — SODIUM CHLORIDE 75 ML/HR: 900 INJECTION, SOLUTION INTRAVENOUS at 15:26

## 2021-07-03 RX ADMIN — METOPROLOL TARTRATE 100 MG: 50 TABLET, FILM COATED ORAL at 10:00

## 2021-07-03 RX ADMIN — METOPROLOL TARTRATE 100 MG: 50 TABLET, FILM COATED ORAL at 21:22

## 2021-07-03 NOTE — PROGRESS NOTES
Bedside and verbal report given to BURAK Onofre RN (oncoming nurse) by Alison Gibbons RN  (off going nurse). Report included the following information SBAR, Kardex, OR Summary, Intake/Output, and MAR. Uneventful shift; no acute changes, no complaints of pain    Bedside and verbal report given by (off going nurse) BURAK Onofre RN to (oncoming nurse) Minoo Meneses RN. Report included the following information SBAR, Kardex, OR Summary, Intake/Output, and MAR.

## 2021-07-03 NOTE — PROGRESS NOTES
Problem: Falls - Risk of  Goal: *Absence of Falls  Description: Document Filiberto Khalil Fall Risk and appropriate interventions in the flowsheet. 7/3/2021 0533 by Soni Perez  Outcome: Progressing Towards Goal  Note: Fall Risk Interventions:            Medication Interventions: Teach patient to arise slowly                7/3/2021 0532 by Ronan FERRARA  Note: Fall Risk Interventions:            Medication Interventions: Teach patient to arise slowly                   Problem: Risk for Spread of Infection  Goal: Prevent transmission of infectious organism to others  Description: Prevent the transmission of infectious organisms to other patients, staff members, and visitors.   7/3/2021 0533 by Soni Perez  Outcome: Progressing Towards Goal  7/3/2021 0532 by Ronan FERRARA  Outcome: Progressing Towards Goal     Problem: Pain  Goal: *Control of Pain  Outcome: Progressing Towards Goal     Problem: Pain  Goal: *Control of Pain  Outcome: Progressing Towards Goal

## 2021-07-03 NOTE — PROGRESS NOTES
Hospitalist Progress Note-critical care note     Patient: Brandie Moore MRN: [id number]  CSN: [id number]    YOB: 1945  Age: 78 y.o. Sex: female    DOA: 7/1/2021 LOS:  LOS: 0 days            Chief complaint: Intractable vomiting, hernia, hypertension, hyponatremia, abdominal pain GERD hypertension    Assessment/Plan         Hospital Problems  Date Reviewed: 1/15/2018        Codes Class Noted POA    * (Principal) Intractable vomiting ICD-10-CM: R11.10  ICD-9-CM: 536.2  7/1/2021 Unknown        Hiatal hernia with GERD ICD-10-CM: K21.9, K44.9  ICD-9-CM: 530.81, 553.3  7/1/2021 Unknown        Hypertension ICD-10-CM: I10  ICD-9-CM: 401.9  Unknown Yes        High cholesterol ICD-10-CM: E78.00  ICD-9-CM: 272.0  Unknown Yes        Hyponatremia ICD-10-CM: E87.1  ICD-9-CM: 276.1  7/1/2021 Unknown        Right lower quadrant abdominal pain ICD-10-CM: R10.31  ICD-9-CM: 789.03  7/1/2021 Unknown        GERD (gastroesophageal reflux disease) ICD-10-CM: K21.9  ICD-9-CM: 530.81  1/15/2018 Yes        HTN (hypertension) ICD-10-CM: I10  ICD-9-CM: 401.9  11/4/2013 Yes               Intractable nausea vomiting-  No n/v ,continue clear diet   Unclear etiology, possibly secondary to large hiatal hernia. CT abdomen pelvis with no evidence of obstruction. Continue symptom treatment       Abdominal pain  Unknown etiology, will have GI on board-not sure why she has the pain. Her abdominal pain and. physical exam is disproportionate to CT findings. General surgery consulted. Dr. Maddy Clark follow-up with patient  Continue  PPI. Cardiac enzymes negative  She reported that she had colonoscopy one year ago        Diarrhea-one BM , no diarrhea        Hyponatremia-improving   Resolved      Hypertension-continue with home medications, monitor blood pressure.     GERD-  Continue with antacid medications.     Subjective: still in pain, I want pain medication , I can put food down       Disposition :tbd,   Review of systems:    General: No fevers or chills. Cardiovascular: No chest pain or pressure. No palpitations. Pulmonary: No shortness of breath. Gastrointestinal: no nausea, no  vomiting. Abdomen pain     Vital signs/Intake and Output:  Visit Vitals  /84 (BP 1 Location: Left upper arm, BP Patient Position: Sitting)   Pulse (!) 56   Temp 98 °F (36.7 °C)   Resp 16   Ht 4' 11\" (1.499 m)   Wt 67.7 kg (149 lb 3.2 oz)   SpO2 100%   BMI 30.13 kg/m²     Current Shift:  No intake/output data recorded. Last three shifts:  07/01 1901 - 07/03 0700  In: 1378.3 [I.V.:1378.3]  Out: 1900 [Urine:1900]    Physical Exam:  General: WD, WN. Alert, cooperative, no acute distress    HEENT: NC, Atraumatic. PERRLA, anicteric sclerae. Lungs: CTA Bilaterally. No Wheezing/Rhonchi/Rales. Heart:  Regular  rhythm,  No murmur, No Rubs, No Gallops  Abdomen: Soft, Non distended, + tender. +Bowel sounds, surgical scar noted   Extremities: No c/c/e  Psych:   Not anxious or agitated. Neurologic:  No acute neurological deficit. Labs: Results:       Chemistry Recent Labs     07/03/21  0504 07/02/21  0430 07/01/21  0345   GLU 78 84 98    135* 130*   K 3.9 3.9 4.7    101 96*   CO2 26 26 26   BUN 12 9 14   CREA 1.05 0.97 1.18   CA 8.9 9.3 9.4   AGAP 8 8 8   BUCR 11* 9* 12   AP 64 73 76   TP 6.6 6.9 7.4   ALB 3.0* 3.3* 3.4   GLOB 3.6 3.6 4.0   AGRAT 0.8 0.9 0.9      CBC w/Diff Recent Labs     07/03/21  0504 07/02/21  0430 07/01/21  0345   WBC 8.1 9.7 7.9   RBC 4.46 4.57 4.66   HGB 13.2 13.5 13.5   HCT 40.0 40.4 42.2    260 248   GRANS 63 71 67   LYMPH 27 20* 25   EOS 2 1 1      Cardiac Enzymes Recent Labs     07/01/21  0345   *   CKND1 1.3      Coagulation No results for input(s): PTP, INR, APTT, INREXT, INREXT in the last 72 hours.     Lipid Panel No results found for: CHOL, CHOLPOCT, CHOLX, CHLST, CHOLV, 996862, HDL, HDLP, LDL, LDLC, DLDLP, 166396, VLDLC, VLDL, TGLX, TRIGL, TRIGP, TGLPOCT, CHHD, CHHDX   BNP No results for input(s): BNPP in the last 72 hours. Liver Enzymes Recent Labs     07/03/21  0504   TP 6.6   ALB 3.0*   AP 64      Thyroid Studies No results found for: T4, T3U, TSH, TSHEXT, TSHEXT     Procedures/imaging: see electronic medical records for all procedures/Xrays and details which were not copied into this note but were reviewed prior to creation of Plan    CT ABD PELV W CONT    Result Date: 7/1/2021  EXAM: CT of the abdomen and pelvis INDICATION: Nausea, vomiting, blood in stool. COMPARISON: CT June 29, 2021, 1/18/2018 TECHNIQUE: Axial CT imaging of the abdomen and pelvis was performed with intravenous contrast. Multiplanar reformats were generated. One or more dose reduction techniques were used on this CT: automated exposure control, adjustment of the mAs and/or kVp according to patient size, and iterative reconstruction techniques. The specific techniques used on this CT exam have been documented in the patient's electronic medical record. Digital Imaging and Communications in Medicine (DICOM) format image data are available to nonaffiliated external healthcare facilities or entities on a secure, media free, reciprocally searchable basis with patient authorization for at least a 12-month period after this study. _______________ FINDINGS: LOWER CHEST: Mild bibasilar atelectasis without evidence of alveolar consolidation or pleural effusion. Cardiac size is within normal limits. Multivessel coronary arterial atherosclerotic vascular calcifications. LIVER, BILIARY: Redemonstration of multiple hepatic hypodensities, either too small to accurately characterize, or in keeping with simple cysts. No biliary dilation. Gallbladder is unremarkable. PANCREAS: Normal. SPLEEN: Normal. ADRENALS: Normal. KIDNEYS/URETERS/BLADDER: Symmetric renal enhancement without hydronephrosis. Bilateral renal hypodensities, either too small to accurately characterize are in keeping with simple cysts.  These appear unchanged from prior. No urolithiasis. Urinary bladder unremarkable. PELVIC ORGANS: Several calcified and noncalcified uterine leiomyomata. VASCULATURE: Atherosclerosis. No evidence of aneurysmal dilatation. LYMPH NODES: No enlarged lymph nodes. GASTROINTESTINAL TRACT: Large hiatus hernia redemonstrated. No abnormal bowel wall thickening or dilatation. Prior colonic resection and reanastomosis within the right upper quadrant of the abdomen. BONES: No acute or aggressive osseous abnormalities identified. Multilevel spondylosis throughout the lower thoracic and lumbar spine. Grade 2 anterolisthesis L4 on L5. Advanced, end stage osteoarthritis of the left hip joint; all appearing unchanged. OTHER: None. _______________     1. Redemonstration of a large hiatus hernia without clearly acute intra-abdominal or pelvic abnormality demonstrated on today's examination.   > No abnormal bowel wall thickening or inflammatory change appreciated. 2. Unchanged small hepatic and renal cysts. 3. Grade 2 anterolisthesis L4 on L5; advanced, end stage osteoarthritis left hip joint. CT ABD PELV W CONT    Result Date: 6/29/2021  EXAM: CT of the abdomen and pelvis INDICATION: 60-year-old patient with abdominal pain COMPARISON: CT performed 1/18/2018 TECHNIQUE: Axial CT imaging of the abdomen and pelvis was performed with intravenous contrast. Multiplanar reformats were generated. One or more dose reduction techniques were used on this CT: automated exposure control, adjustment of the mAs and/or kVp according to patient size, and iterative reconstruction techniques. The specific techniques used on this CT exam have been documented in the patient's electronic medical record. Digital Imaging and Communications in Medicine (DICOM) format image data are available to nonaffiliated external healthcare facilities or entities on a secure, media free, reciprocally searchable basis with patient authorization for at least a 12-month period after this study. _______________ FINDINGS: LOWER CHEST: Mild basilar changes of atelectasis without alveolar consolidation or pleural effusion. Normal cardiac size. No pericardial effusion. LIVER, BILIARY: Hepatic parenchymal enhancement is uniform. There are scattered hepatic hypodensities redemonstrated which are either too small accurately characterize or are in keeping with simple cysts. No biliary dilation. Gallbladder is unremarkable. PANCREAS: Normal. SPLEEN: Normal. ADRENALS: Normal. KIDNEYS/URETERS/BLADDER: Symmetric renal enhancement. No hydronephrosis. Multiple bilateral renal hypodensities, some too small to accurately characterize, with others representing simple cysts which require no further dedicated imaging follow-up. Urinary bladder is unremarkable. PELVIC ORGANS: Several calcified and noncalcified uterine leiomyomata. Adnexa appear within normal limits. VASCULATURE: Diffuse atherosclerosis. No evidence of aneurysmal dilatation. LYMPH NODES: No enlarged lymph nodes. GASTROINTESTINAL TRACT: There is a large hiatus hernia. No evidence of perigastric inflammatory change or stranding. Gastroduodenal junction and is below the level of the diaphragm. Evidence of prior colonic resection and reanastomosis. No morphology of bowel obstruction. No free intraperitoneal gas. Normal appendix. BONES: Moderate anterolisthesis of L4 on L5. Multilevel lower thoracic and lumbar spondylosis as well as lower lumbar predominant facet joint osteoarthritis, as previously. 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 OTHER: None. _______________     1. No focal bowel wall thickening or inflammatory change. No morphology of bowel obstruction. Normal appendix. 2. Large hiatus hernia. 3. Mild left basilar atelectasis with otherwise clear lung bases.  4. Grade 2 anterolisthesis L4 on L5 with advanced lower lumbar predominant spondylosis and facet joint osteoarthritis. 5. Advanced, end stage osteoarthritis of the left hip joint    US ABD LTD    Result Date: 7/1/2021  EXAM: Right upper quadrant limited abdominal ultrasound INDICATION: Abdominal pain and rectal bleeding. COMPARISON: CT abdomen pelvis 6/29/2021. _______________ FINDINGS: LIVER: Normal in echotexture. Several small cysts the largest 9 mm, no new focal hepatic mass. Normal direction of blood flow in the portal vein. BILIARY SYSTEM: No intrahepatic biliary dilatation. Common bile duct is normal in caliber measuring 0.4 cm. GALLBLADDER: No gallstones or gallbladder wall thickening. RIGHT KIDNEY: 10.2 cm in length. No hydronephrosis or renal mass. No visible calculi. Several small benign-appearing cysts, the largest 1.5 cm. PANCREAS: Head and body are unremarkable in appearance though the tail is obscured by overlying bowel gas. IVC: Visualized portions are unremarkable in appearance. OTHER: No free intraperitoneal fluid. _______________     1. Unremarkable sonographic exam of the gallbladder, with no new acute findings. 2. Stable small benign-appearing hepatic and right renal cysts.        Lele Harris MD

## 2021-07-04 LAB
ALBUMIN SERPL-MCNC: 3 G/DL (ref 3.4–5)
ALBUMIN/GLOB SERPL: 0.9 {RATIO} (ref 0.8–1.7)
ALP SERPL-CCNC: 65 U/L (ref 45–117)
ALT SERPL-CCNC: 14 U/L (ref 13–56)
ANION GAP SERPL CALC-SCNC: 7 MMOL/L (ref 3–18)
AST SERPL-CCNC: 20 U/L (ref 10–38)
BASOPHILS # BLD: 0 K/UL (ref 0–0.1)
BASOPHILS NFR BLD: 0 % (ref 0–2)
BILIRUB SERPL-MCNC: 0.7 MG/DL (ref 0.2–1)
BUN SERPL-MCNC: 14 MG/DL (ref 7–18)
BUN/CREAT SERPL: 13 (ref 12–20)
CALCIUM SERPL-MCNC: 9.2 MG/DL (ref 8.5–10.1)
CHLORIDE SERPL-SCNC: 106 MMOL/L (ref 100–111)
CO2 SERPL-SCNC: 25 MMOL/L (ref 21–32)
CREAT SERPL-MCNC: 1.12 MG/DL (ref 0.6–1.3)
DIFFERENTIAL METHOD BLD: NORMAL
EOSINOPHIL # BLD: 0.1 K/UL (ref 0–0.4)
EOSINOPHIL NFR BLD: 1 % (ref 0–5)
ERYTHROCYTE [DISTWIDTH] IN BLOOD BY AUTOMATED COUNT: 13.2 % (ref 11.6–14.5)
GLOBULIN SER CALC-MCNC: 3.4 G/DL (ref 2–4)
GLUCOSE BLD STRIP.AUTO-MCNC: 85 MG/DL (ref 70–110)
GLUCOSE SERPL-MCNC: 72 MG/DL (ref 74–99)
HCT VFR BLD AUTO: 40.4 % (ref 35–45)
HGB BLD-MCNC: 13.2 G/DL (ref 12–16)
LYMPHOCYTES # BLD: 2.8 K/UL (ref 0.9–3.6)
LYMPHOCYTES NFR BLD: 31 % (ref 21–52)
MCH RBC QN AUTO: 29.4 PG (ref 24–34)
MCHC RBC AUTO-ENTMCNC: 32.7 G/DL (ref 31–37)
MCV RBC AUTO: 90 FL (ref 74–97)
MONOCYTES # BLD: 0.7 K/UL (ref 0.05–1.2)
MONOCYTES NFR BLD: 7 % (ref 3–10)
NEUTS SEG # BLD: 5.4 K/UL (ref 1.8–8)
NEUTS SEG NFR BLD: 60 % (ref 40–73)
PLATELET # BLD AUTO: 239 K/UL (ref 135–420)
PMV BLD AUTO: 9.9 FL (ref 9.2–11.8)
POTASSIUM SERPL-SCNC: 3.8 MMOL/L (ref 3.5–5.5)
PROT SERPL-MCNC: 6.4 G/DL (ref 6.4–8.2)
RBC # BLD AUTO: 4.49 M/UL (ref 4.2–5.3)
SODIUM SERPL-SCNC: 138 MMOL/L (ref 136–145)
WBC # BLD AUTO: 9.1 K/UL (ref 4.6–13.2)

## 2021-07-04 PROCEDURE — 74011250636 HC RX REV CODE- 250/636: Performed by: HOSPITALIST

## 2021-07-04 PROCEDURE — 74011000250 HC RX REV CODE- 250: Performed by: HOSPITALIST

## 2021-07-04 PROCEDURE — 82962 GLUCOSE BLOOD TEST: CPT

## 2021-07-04 PROCEDURE — 74011250637 HC RX REV CODE- 250/637: Performed by: HOSPITALIST

## 2021-07-04 PROCEDURE — 96372 THER/PROPH/DIAG INJ SC/IM: CPT

## 2021-07-04 PROCEDURE — 99218 HC RM OBSERVATION: CPT

## 2021-07-04 PROCEDURE — 36415 COLL VENOUS BLD VENIPUNCTURE: CPT

## 2021-07-04 PROCEDURE — 80053 COMPREHEN METABOLIC PANEL: CPT

## 2021-07-04 PROCEDURE — C9113 INJ PANTOPRAZOLE SODIUM, VIA: HCPCS | Performed by: HOSPITALIST

## 2021-07-04 PROCEDURE — 96376 TX/PRO/DX INJ SAME DRUG ADON: CPT

## 2021-07-04 PROCEDURE — 85025 COMPLETE CBC W/AUTO DIFF WBC: CPT

## 2021-07-04 RX ADMIN — METOPROLOL TARTRATE 100 MG: 50 TABLET, FILM COATED ORAL at 20:08

## 2021-07-04 RX ADMIN — SODIUM CHLORIDE 75 ML/HR: 900 INJECTION, SOLUTION INTRAVENOUS at 16:29

## 2021-07-04 RX ADMIN — ATORVASTATIN CALCIUM 10 MG: 10 TABLET, FILM COATED ORAL at 21:05

## 2021-07-04 RX ADMIN — HYDROCHLOROTHIAZIDE 25 MG: 25 TABLET ORAL at 08:18

## 2021-07-04 RX ADMIN — ASPIRIN 325 MG ORAL TABLET 325 MG: 325 PILL ORAL at 08:18

## 2021-07-04 RX ADMIN — SPIRONOLACTONE 25 MG: 25 TABLET ORAL at 08:18

## 2021-07-04 RX ADMIN — ENOXAPARIN SODIUM 40 MG: 40 INJECTION SUBCUTANEOUS at 16:27

## 2021-07-04 RX ADMIN — SODIUM CHLORIDE 40 MG: 9 INJECTION, SOLUTION INTRAMUSCULAR; INTRAVENOUS; SUBCUTANEOUS at 20:06

## 2021-07-04 RX ADMIN — METOPROLOL TARTRATE 100 MG: 50 TABLET, FILM COATED ORAL at 08:18

## 2021-07-04 RX ADMIN — MORPHINE SULFATE 1 MG: 2 INJECTION, SOLUTION INTRAMUSCULAR; INTRAVENOUS at 20:06

## 2021-07-04 RX ADMIN — SODIUM CHLORIDE 40 MG: 9 INJECTION, SOLUTION INTRAMUSCULAR; INTRAVENOUS; SUBCUTANEOUS at 08:17

## 2021-07-04 RX ADMIN — MORPHINE SULFATE 1 MG: 2 INJECTION, SOLUTION INTRAMUSCULAR; INTRAVENOUS at 08:18

## 2021-07-04 RX ADMIN — SODIUM CHLORIDE 75 ML/HR: 900 INJECTION, SOLUTION INTRAVENOUS at 04:21

## 2021-07-04 NOTE — PROGRESS NOTES
1930 - Bedside and Verbal shift change report given to Erica Dinero RN (oncoming nurse) by Sulema Mac RN (offgoing nurse). Report included the following information SBAR, Kardex, Intake/Output, MAR and Recent Results. PRN medication given x 2 for abdominal pain. Denied having n/v or bowel movement. Oncoming RN made aware of outstanding orders scheduled for completion today. 0740 - Bedside and Verbal shift change report given to JARED Keane (oncoming nurse) by Erica Dinero RN (offgoing nurse). Report included the following information SBAR, Kardex, Intake/Output, MAR and Recent Results.

## 2021-07-04 NOTE — PROGRESS NOTES
Hospitalist Progress Note-critical care note     Patient: Augustin Cornelius MRN: [id number]  CSN: [id number]    YOB: 1945  Age: 78 y.o. Sex: female    DOA: 7/1/2021 LOS:  LOS: 0 days            Chief complaint: Intractable vomiting, hernia, hypertension, hyponatremia, abdominal pain GERD hypertension    Assessment/Plan         Hospital Problems  Date Reviewed: 1/15/2018        Codes Class Noted POA    * (Principal) Intractable vomiting ICD-10-CM: R11.10  ICD-9-CM: 536.2  7/1/2021 Unknown        Hiatal hernia with GERD ICD-10-CM: K21.9, K44.9  ICD-9-CM: 530.81, 553.3  7/1/2021 Unknown        Hypertension ICD-10-CM: I10  ICD-9-CM: 401.9  Unknown Yes        High cholesterol ICD-10-CM: E78.00  ICD-9-CM: 272.0  Unknown Yes        Hyponatremia ICD-10-CM: E87.1  ICD-9-CM: 276.1  7/1/2021 Unknown        Right lower quadrant abdominal pain ICD-10-CM: R10.31  ICD-9-CM: 789.03  7/1/2021 Unknown        GERD (gastroesophageal reflux disease) ICD-10-CM: K21.9  ICD-9-CM: 530.81  1/15/2018 Yes        HTN (hypertension) ICD-10-CM: I10  ICD-9-CM: 401.9  11/4/2013 Yes               Intractable nausea vomiting-  No n/v ,tolerated continue clear diet   Unclear etiology, possibly secondary to large hiatal hernia. CT abdomen pelvis with no evidence of obstruction. Continue symptom treatment       Abdominal pain  Unknown etiology,   Ultrasound for abdomen a/v   Case discussed with Hanane Barcenas yesterday-recommend gi series and surgical consult   Her abdominal pain and. physical exam is disproportionate to CT findings. General surgery consulted. Dr. Jaymie Saucedo follow-up with patient  Continue  PPI. Cardiac enzymes negative  She reported that she had colonoscopy one year ago        Diarrhea-one BM , no diarrhea        Hyponatremia-improving   Resolved      Hypertension-continue with home medications, monitor blood pressure.     GERD-  Continue with antacid medications.     Subjective: still having   pain, I want pain medication     Rn:still having abdomen pain, radiologist no on site today- test can not be done today   Disposition :tbd,   Review of systems:    General: No fevers or chills. Cardiovascular: No chest pain or pressure. No palpitations. Pulmonary: No shortness of breath. Gastrointestinal: no nausea, no  vomiting. Abdomen pain     Vital signs/Intake and Output:  Visit Vitals  BP (!) 156/73   Pulse (!) 59   Temp 97.6 °F (36.4 °C)   Resp 15   Ht 4' 11\" (1.499 m)   Wt 66.1 kg (145 lb 12.8 oz)   SpO2 100%   BMI 29.45 kg/m²     Current Shift:  07/04 0701 - 07/04 1900  In: -   Out: 650 [Urine:650]  Last three shifts:  07/02 1901 - 07/04 0700  In: -   Out: 600 [Urine:600]    Physical Exam:  General: WD, WN. Alert, cooperative, no acute distress    HEENT: NC, Atraumatic. PERRLA, anicteric sclerae. Lungs: CTA Bilaterally. No Wheezing/Rhonchi/Rales. Heart:  Regular  rhythm,  No murmur, No Rubs, No Gallops  Abdomen: Soft, Non distended, + tender. +Bowel sounds, surgical scar noted   Extremities: No c/c/e  Psych:   Not anxious or agitated. Neurologic:  No acute neurological deficit. Labs: Results:       Chemistry Recent Labs     07/04/21 0452 07/03/21  0504 07/02/21  0430   GLU 72* 78 84    138 135*   K 3.8 3.9 3.9    104 101   CO2 25 26 26   BUN 14 12 9   CREA 1.12 1.05 0.97   CA 9.2 8.9 9.3   AGAP 7 8 8   BUCR 13 11* 9*   AP 65 64 73   TP 6.4 6.6 6.9   ALB 3.0* 3.0* 3.3*   GLOB 3.4 3.6 3.6   AGRAT 0.9 0.8 0.9      CBC w/Diff Recent Labs     07/04/21 0452 07/03/21  0504 07/02/21  0430   WBC 9.1 8.1 9.7   RBC 4.49 4.46 4.57   HGB 13.2 13.2 13.5   HCT 40.4 40.0 40.4    248 260   GRANS 60 63 71   LYMPH 31 27 20*   EOS 1 2 1      Cardiac Enzymes No results for input(s): CPK, CKND1, CHRISTINA in the last 72 hours. No lab exists for component: CKRMB, TROIP   Coagulation No results for input(s): PTP, INR, APTT, INREXT, INREXT in the last 72 hours.     Lipid Panel No results found for: CHOL, CHOLPOCT, CHOLX, CHLST, CHOLV, [id number], HDL, HDLP, LDL, LDLC, DLDLP, [id number], VLDLC, VLDL, TGLX, TRIGL, TRIGP, TGLPOCT, CHHD, CHHDX   BNP No results for input(s): BNPP in the last 72 hours. Liver Enzymes Recent Labs     07/04/21  0452   TP 6.4   ALB 3.0*   AP 65      Thyroid Studies No results found for: T4, T3U, TSH, TSHEXT, TSHEXT     Procedures/imaging: see electronic medical records for all procedures/Xrays and details which were not copied into this note but were reviewed prior to creation of Plan    CT ABD PELV W CONT    Result Date: 7/1/2021  EXAM: CT of the abdomen and pelvis INDICATION: Nausea, vomiting, blood in stool. COMPARISON: CT June 29, 2021, 1/18/2018 TECHNIQUE: Axial CT imaging of the abdomen and pelvis was performed with intravenous contrast. Multiplanar reformats were generated. One or more dose reduction techniques were used on this CT: automated exposure control, adjustment of the mAs and/or kVp according to patient size, and iterative reconstruction techniques. The specific techniques used on this CT exam have been documented in the patient's electronic medical record. Digital Imaging and Communications in Medicine (DICOM) format image data are available to nonaffiliated external healthcare facilities or entities on a secure, media free, reciprocally searchable basis with patient authorization for at least a 12-month period after this study. _______________ FINDINGS: LOWER CHEST: Mild bibasilar atelectasis without evidence of alveolar consolidation or pleural effusion. Cardiac size is within normal limits. Multivessel coronary arterial atherosclerotic vascular calcifications. LIVER, BILIARY: Redemonstration of multiple hepatic hypodensities, either too small to accurately characterize, or in keeping with simple cysts. No biliary dilation. Gallbladder is unremarkable.  PANCREAS: Normal. SPLEEN: Normal. ADRENALS: Normal. KIDNEYS/URETERS/BLADDER: Symmetric renal enhancement without hydronephrosis. Bilateral renal hypodensities, either too small to accurately characterize are in keeping with simple cysts. These appear unchanged from prior. No urolithiasis. Urinary bladder unremarkable. PELVIC ORGANS: Several calcified and noncalcified uterine leiomyomata. VASCULATURE: Atherosclerosis. No evidence of aneurysmal dilatation. LYMPH NODES: No enlarged lymph nodes. GASTROINTESTINAL TRACT: Large hiatus hernia redemonstrated. No abnormal bowel wall thickening or dilatation. Prior colonic resection and reanastomosis within the right upper quadrant of the abdomen. BONES: No acute or aggressive osseous abnormalities identified. Multilevel spondylosis throughout the lower thoracic and lumbar spine. Grade 2 anterolisthesis L4 on L5. Advanced, end stage osteoarthritis of the left hip joint; all appearing unchanged. OTHER: None. _______________     1. Redemonstration of a large hiatus hernia without clearly acute intra-abdominal or pelvic abnormality demonstrated on today's examination.   > No abnormal bowel wall thickening or inflammatory change appreciated. 2. Unchanged small hepatic and renal cysts. 3. Grade 2 anterolisthesis L4 on L5; advanced, end stage osteoarthritis left hip joint. CT ABD PELV W CONT    Result Date: 6/29/2021  EXAM: CT of the abdomen and pelvis INDICATION: 79-year-old patient with abdominal pain COMPARISON: CT performed 1/18/2018 TECHNIQUE: Axial CT imaging of the abdomen and pelvis was performed with intravenous contrast. Multiplanar reformats were generated. One or more dose reduction techniques were used on this CT: automated exposure control, adjustment of the mAs and/or kVp according to patient size, and iterative reconstruction techniques. The specific techniques used on this CT exam have been documented in the patient's electronic medical record.   Digital Imaging and Communications in Medicine (DICOM) format image data are available to nonaffiliated external healthcare facilities or entities on a secure, media free, reciprocally searchable basis with patient authorization for at least a 12-month period after this study. _______________ FINDINGS: LOWER CHEST: Mild basilar changes of atelectasis without alveolar consolidation or pleural effusion. Normal cardiac size. No pericardial effusion. LIVER, BILIARY: Hepatic parenchymal enhancement is uniform. There are scattered hepatic hypodensities redemonstrated which are either too small accurately characterize or are in keeping with simple cysts. No biliary dilation. Gallbladder is unremarkable. PANCREAS: Normal. SPLEEN: Normal. ADRENALS: Normal. KIDNEYS/URETERS/BLADDER: Symmetric renal enhancement. No hydronephrosis. Multiple bilateral renal hypodensities, some too small to accurately characterize, with others representing simple cysts which require no further dedicated imaging follow-up. Urinary bladder is unremarkable. PELVIC ORGANS: Several calcified and noncalcified uterine leiomyomata. Adnexa appear within normal limits. VASCULATURE: Diffuse atherosclerosis. No evidence of aneurysmal dilatation. LYMPH NODES: No enlarged lymph nodes. GASTROINTESTINAL TRACT: There is a large hiatus hernia. No evidence of perigastric inflammatory change or stranding. Gastroduodenal junction and is below the level of the diaphragm. Evidence of prior colonic resection and reanastomosis. No morphology of bowel obstruction. No free intraperitoneal gas. Normal appendix. BONES: Moderate anterolisthesis of L4 on L5. Multilevel lower thoracic and lumbar spondylosis as well as lower lumbar predominant facet joint osteoarthritis, as previously. 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 OTHER: None. _______________     1. No focal bowel wall thickening or inflammatory change.  No morphology of bowel obstruction. Normal appendix. 2. Large hiatus hernia. 3. Mild left basilar atelectasis with otherwise clear lung bases. 4. Grade 2 anterolisthesis L4 on L5 with advanced lower lumbar predominant spondylosis and facet joint osteoarthritis. 5. Advanced, end stage osteoarthritis of the left hip joint    US ABD LTD    Result Date: 7/1/2021  EXAM: Right upper quadrant limited abdominal ultrasound INDICATION: Abdominal pain and rectal bleeding. COMPARISON: CT abdomen pelvis 6/29/2021. _______________ FINDINGS: LIVER: Normal in echotexture. Several small cysts the largest 9 mm, no new focal hepatic mass. Normal direction of blood flow in the portal vein. BILIARY SYSTEM: No intrahepatic biliary dilatation. Common bile duct is normal in caliber measuring 0.4 cm. GALLBLADDER: No gallstones or gallbladder wall thickening. RIGHT KIDNEY: 10.2 cm in length. No hydronephrosis or renal mass. No visible calculi. Several small benign-appearing cysts, the largest 1.5 cm. PANCREAS: Head and body are unremarkable in appearance though the tail is obscured by overlying bowel gas. IVC: Visualized portions are unremarkable in appearance. OTHER: No free intraperitoneal fluid. _______________     1. Unremarkable sonographic exam of the gallbladder, with no new acute findings. 2. Stable small benign-appearing hepatic and right renal cysts.        Merle Quiros MD

## 2021-07-04 NOTE — PROGRESS NOTES
Problem: Falls - Risk of  Goal: *Absence of Falls  Description: Document Jennifer Gimenez Fall Risk and appropriate interventions in the flowsheet. Outcome: Progressing Towards Goal  Note: Fall Risk Interventions:            Medication Interventions: Teach patient to arise slowly, Patient to call before getting OOB                   Problem: Patient Education: Go to Patient Education Activity  Goal: Patient/Family Education  Outcome: Progressing Towards Goal     Problem: Risk for Spread of Infection  Goal: Prevent transmission of infectious organism to others  Description: Prevent the transmission of infectious organisms to other patients, staff members, and visitors.   Outcome: Progressing Towards Goal     Problem: Patient Education:  Go to Education Activity  Goal: Patient/Family Education  Outcome: Progressing Towards Goal     Problem: Pain  Goal: *Control of Pain  Outcome: Progressing Towards Goal

## 2021-07-04 NOTE — ROUTINE PROCESS
Bedside and Verbal shift change report given to NELDA Alcala RN (oncoming nurse) by Steve Cline RN (offgoing nurse). Report included the following information SBAR, Kardex, Intake/Output, MAR and Recent Results. Introduced self to pt, updated whiteboard. Pt on BSC at this time. Call bel within reach. 2041- pt c/o pain 10/10 in abd, requesting PRN pain meds. PRN morphine given per MD order. 2127- pt states pain is coming down 6/10, pt is going to try and rest to see if pain continues to come down. 2159- pt resting quietly in bed with chest rising and falling. Bedside and Verbal shift change report given to Steve Cline RN (oncoming nurse) by Radha Alcala RN (offgoing nurse). Report included the following information SBAR, Kardex, Intake/Output, MAR and Recent Results. Yes

## 2021-07-05 LAB
ALBUMIN SERPL-MCNC: 2.8 G/DL (ref 3.4–5)
ALBUMIN/GLOB SERPL: 0.9 {RATIO} (ref 0.8–1.7)
ALP SERPL-CCNC: 58 U/L (ref 45–117)
ALT SERPL-CCNC: 15 U/L (ref 13–56)
ANION GAP SERPL CALC-SCNC: 8 MMOL/L (ref 3–18)
AST SERPL-CCNC: 18 U/L (ref 10–38)
BASOPHILS # BLD: 0 K/UL (ref 0–0.1)
BASOPHILS NFR BLD: 0 % (ref 0–2)
BILIRUB SERPL-MCNC: 0.6 MG/DL (ref 0.2–1)
BUN SERPL-MCNC: 11 MG/DL (ref 7–18)
BUN/CREAT SERPL: 11 (ref 12–20)
CALCIUM SERPL-MCNC: 8.7 MG/DL (ref 8.5–10.1)
CHLORIDE SERPL-SCNC: 107 MMOL/L (ref 100–111)
CO2 SERPL-SCNC: 25 MMOL/L (ref 21–32)
CREAT SERPL-MCNC: 1.02 MG/DL (ref 0.6–1.3)
DIFFERENTIAL METHOD BLD: NORMAL
EOSINOPHIL # BLD: 0.1 K/UL (ref 0–0.4)
EOSINOPHIL NFR BLD: 1 % (ref 0–5)
ERYTHROCYTE [DISTWIDTH] IN BLOOD BY AUTOMATED COUNT: 13.4 % (ref 11.6–14.5)
GLOBULIN SER CALC-MCNC: 3.2 G/DL (ref 2–4)
GLUCOSE SERPL-MCNC: 76 MG/DL (ref 74–99)
HCT VFR BLD AUTO: 38.5 % (ref 35–45)
HGB BLD-MCNC: 12.5 G/DL (ref 12–16)
LYMPHOCYTES # BLD: 2.5 K/UL (ref 0.9–3.6)
LYMPHOCYTES NFR BLD: 33 % (ref 21–52)
MCH RBC QN AUTO: 29.6 PG (ref 24–34)
MCHC RBC AUTO-ENTMCNC: 32.5 G/DL (ref 31–37)
MCV RBC AUTO: 91 FL (ref 74–97)
MONOCYTES # BLD: 0.6 K/UL (ref 0.05–1.2)
MONOCYTES NFR BLD: 7 % (ref 3–10)
NEUTS SEG # BLD: 4.5 K/UL (ref 1.8–8)
NEUTS SEG NFR BLD: 58 % (ref 40–73)
PLATELET # BLD AUTO: 227 K/UL (ref 135–420)
PMV BLD AUTO: 10.3 FL (ref 9.2–11.8)
POTASSIUM SERPL-SCNC: 3.6 MMOL/L (ref 3.5–5.5)
PROT SERPL-MCNC: 6 G/DL (ref 6.4–8.2)
RBC # BLD AUTO: 4.23 M/UL (ref 4.2–5.3)
SODIUM SERPL-SCNC: 140 MMOL/L (ref 136–145)
WBC # BLD AUTO: 7.7 K/UL (ref 4.6–13.2)

## 2021-07-05 PROCEDURE — 36415 COLL VENOUS BLD VENIPUNCTURE: CPT

## 2021-07-05 PROCEDURE — 80053 COMPREHEN METABOLIC PANEL: CPT

## 2021-07-05 PROCEDURE — 99218 HC RM OBSERVATION: CPT

## 2021-07-05 PROCEDURE — 96372 THER/PROPH/DIAG INJ SC/IM: CPT

## 2021-07-05 PROCEDURE — 96376 TX/PRO/DX INJ SAME DRUG ADON: CPT

## 2021-07-05 PROCEDURE — 85025 COMPLETE CBC W/AUTO DIFF WBC: CPT

## 2021-07-05 PROCEDURE — 74011250636 HC RX REV CODE- 250/636: Performed by: HOSPITALIST

## 2021-07-05 PROCEDURE — 74011000250 HC RX REV CODE- 250: Performed by: HOSPITALIST

## 2021-07-05 PROCEDURE — C9113 INJ PANTOPRAZOLE SODIUM, VIA: HCPCS | Performed by: HOSPITALIST

## 2021-07-05 PROCEDURE — 86677 HELICOBACTER PYLORI ANTIBODY: CPT

## 2021-07-05 PROCEDURE — 74011250637 HC RX REV CODE- 250/637: Performed by: HOSPITALIST

## 2021-07-05 RX ADMIN — SODIUM CHLORIDE 75 ML/HR: 900 INJECTION, SOLUTION INTRAVENOUS at 05:00

## 2021-07-05 RX ADMIN — ASPIRIN 325 MG ORAL TABLET 325 MG: 325 PILL ORAL at 10:34

## 2021-07-05 RX ADMIN — ATORVASTATIN CALCIUM 10 MG: 10 TABLET, FILM COATED ORAL at 21:04

## 2021-07-05 RX ADMIN — ENOXAPARIN SODIUM 40 MG: 40 INJECTION SUBCUTANEOUS at 16:55

## 2021-07-05 RX ADMIN — SODIUM CHLORIDE 40 MG: 9 INJECTION, SOLUTION INTRAMUSCULAR; INTRAVENOUS; SUBCUTANEOUS at 21:04

## 2021-07-05 RX ADMIN — METOPROLOL TARTRATE 100 MG: 50 TABLET, FILM COATED ORAL at 21:04

## 2021-07-05 RX ADMIN — SODIUM CHLORIDE 40 MG: 9 INJECTION, SOLUTION INTRAMUSCULAR; INTRAVENOUS; SUBCUTANEOUS at 10:33

## 2021-07-05 RX ADMIN — HYDROCHLOROTHIAZIDE 25 MG: 25 TABLET ORAL at 10:34

## 2021-07-05 RX ADMIN — MORPHINE SULFATE 1 MG: 2 INJECTION, SOLUTION INTRAMUSCULAR; INTRAVENOUS at 07:17

## 2021-07-05 RX ADMIN — METOPROLOL TARTRATE 100 MG: 50 TABLET, FILM COATED ORAL at 10:34

## 2021-07-05 RX ADMIN — SPIRONOLACTONE 25 MG: 25 TABLET ORAL at 10:34

## 2021-07-05 NOTE — PROGRESS NOTES
Chart reviewed by on call cm and can be notified by [facility]  for immediate needs.

## 2021-07-05 NOTE — PROGRESS NOTES
0720 Received bedside shift report from off going nurse Sivakumar Vásquez, [address] [place] [address]. Report included the following information SBAR, Kardex, Intake/Output, MAR and Recent Results. 1700 SCD's applied bilaterally. 1925 Gave bedside shift report to oncoming nurse Alena Moran RN. Report included the following information: SBAR, Kardex, Intake/Output, MAR and Recent Results.

## 2021-07-05 NOTE — PROGRESS NOTES
Hospitalist Progress Note    Patient: Isa Beal MRN: [id number]  CSN: [id number]    YOB: 1945  Age: 78 y.o. Sex: female    DOA: 7/1/2021 LOS:  LOS: 0 days                Assessment/Plan     Patient Active Problem List   Diagnosis Code    HTN (hypertension) I10    Diverticulosis of colon K57.30    Internal hemorrhoids K64.8    GERD (gastroesophageal reflux disease) K21.9    HLD (hyperlipidemia) C64.4    Diastolic CHF, chronic (HCC) I50.32    Proctitis K62.89    Hypoalbuminemia E88.09    Intractable vomiting R11.10    Hiatal hernia with GERD K21.9, K44.9    Hypertension I10    High cholesterol E78.00    Hyponatremia E87.1    Right lower quadrant abdominal pain R10.31        78 y.o. female with hypertension, hyperlipidemia, hiatal hernia, GERD, breast cancer presents to ER with concerns of abdominal pain, nausea/vomiting. Intractable nausea vomiting-  Unclear etiology, possibly secondary to large hiatal hernia. Antiemetics as needed. CT abdomen pelvis with no evidence of obstruction. Resolved  Will start on regular diet     Abdominal pain-  Her abdominal pain and. physical exam is disproportionate to CT findings. Seen by general surgery. Abdominal US with no acute findings,   Small bowel series pending. GI consulted.       Diarrhea-  Resolved    Heme positive stools -  GI consulted     Hyponatremia-  resolved     Hypertension-  Continue with home medications, monitor blood pressure.     GERD-  Continue with antacid medications.        DVT prophylaxis with SCD    Disposition : 1-2 days    Review of systems  General: No fevers or chills. Cardiovascular: No chest pain or pressure. No palpitations. Pulmonary: No shortness of breath. Gastrointestinal: see above. Physical Exam:  General: Awake, cooperative, no acute distress    HEENT: NC, Atraumatic. PERRLA, anicteric sclerae. Lungs: CTA Bilaterally. No Wheezing/Rhonchi/Rales.   Heart:  S1 S2,  No murmur, No Rubs, No Gallops  Abdomen: Soft, Non distended, gen tender.  +Bowel sounds,   Extremities: No c/c/e  Psych:   Not anxious or agitated. Neurologic:  No acute neurological deficit. Vital signs/Intake and Output:  Visit Vitals  BP (!) 150/68   Pulse (!) 58   Temp 98.2 °F (36.8 °C)   Resp 16   Ht 4' 11\" (1.499 m)   Wt 66.1 kg (145 lb 12.8 oz)   SpO2 99%   BMI 29.45 kg/m²     Current Shift:  07/05 0701 - 07/05 1900  In: 980 [P.O.:980]  Out: 200 [Urine:200]  Last three shifts:  07/03 1901 - 07/05 0700  In: 2200 [P.O.:400; I.V.:1800]  Out: 2550 [Urine:2550]            Labs: Results:       Chemistry Recent Labs     07/05/21  0510 07/04/21  0452 07/03/21  0504   GLU 76 72* 78    138 138   K 3.6 3.8 3.9    106 104   CO2 25 25 26   BUN 11 14 12   CREA 1.02 1.12 1.05   CA 8.7 9.2 8.9   AGAP 8 7 8   BUCR 11* 13 11*   AP 58 65 64   TP 6.0* 6.4 6.6   ALB 2.8* 3.0* 3.0*   GLOB 3.2 3.4 3.6   AGRAT 0.9 0.9 0.8      CBC w/Diff Recent Labs     07/05/21  0510 07/04/21  0452 07/03/21  0504   WBC 7.7 9.1 8.1   RBC 4.23 4.49 4.46   HGB 12.5 13.2 13.2   HCT 38.5 40.4 40.0    239 248   GRANS 58 60 63   LYMPH 33 31 27   EOS 1 1 2      Cardiac Enzymes No results for input(s): CPK, CKND1, CHRISTINA in the last 72 hours. No lab exists for component: CKRMB, TROIP   Coagulation No results for input(s): PTP, INR, APTT, INREXT in the last 72 hours. Lipid Panel No results found for: CHOL, CHOLPOCT, CHOLX, CHLST, CHOLV, 556141, HDL, HDLP, LDL, LDLC, DLDLP, 743542, VLDLC, VLDL, TGLX, TRIGL, TRIGP, TGLPOCT, CHHD, CHHDX   BNP No results for input(s): BNPP in the last 72 hours.    Liver Enzymes Recent Labs     07/05/21  0510   TP 6.0*   ALB 2.8*   AP 58      Thyroid Studies No results found for: T4, T3U, TSH, TSHEXT     Procedures/imaging: see electronic medical records for all procedures/Xrays and details which were not copied into this note but were reviewed prior to creation of Plan

## 2021-07-06 ENCOUNTER — APPOINTMENT (OUTPATIENT)
Dept: ULTRASOUND IMAGING | Age: 76
End: 2021-07-06
Attending: HOSPITALIST
Payer: MEDICARE

## 2021-07-06 LAB
ALBUMIN SERPL-MCNC: 3 G/DL (ref 3.4–5)
ALBUMIN/GLOB SERPL: 0.9 {RATIO} (ref 0.8–1.7)
ALP SERPL-CCNC: 65 U/L (ref 45–117)
ALT SERPL-CCNC: 13 U/L (ref 13–56)
ANION GAP SERPL CALC-SCNC: 7 MMOL/L (ref 3–18)
AST SERPL-CCNC: 13 U/L (ref 10–38)
BASOPHILS # BLD: 0 K/UL (ref 0–0.1)
BASOPHILS NFR BLD: 0 % (ref 0–2)
BILIRUB SERPL-MCNC: 0.7 MG/DL (ref 0.2–1)
BUN SERPL-MCNC: 10 MG/DL (ref 7–18)
BUN/CREAT SERPL: 9 (ref 12–20)
CALCIUM SERPL-MCNC: 9.3 MG/DL (ref 8.5–10.1)
CHLORIDE SERPL-SCNC: 106 MMOL/L (ref 100–111)
CO2 SERPL-SCNC: 27 MMOL/L (ref 21–32)
CREAT SERPL-MCNC: 1.06 MG/DL (ref 0.6–1.3)
DIFFERENTIAL METHOD BLD: NORMAL
EOSINOPHIL # BLD: 0.1 K/UL (ref 0–0.4)
EOSINOPHIL NFR BLD: 1 % (ref 0–5)
ERYTHROCYTE [DISTWIDTH] IN BLOOD BY AUTOMATED COUNT: 13.6 % (ref 11.6–14.5)
GLOBULIN SER CALC-MCNC: 3.2 G/DL (ref 2–4)
GLUCOSE SERPL-MCNC: 76 MG/DL (ref 74–99)
HCT VFR BLD AUTO: 39.2 % (ref 35–45)
HGB BLD-MCNC: 12.6 G/DL (ref 12–16)
LYMPHOCYTES # BLD: 2.5 K/UL (ref 0.9–3.6)
LYMPHOCYTES NFR BLD: 33 % (ref 21–52)
MCH RBC QN AUTO: 29.1 PG (ref 24–34)
MCHC RBC AUTO-ENTMCNC: 32.1 G/DL (ref 31–37)
MCV RBC AUTO: 90.5 FL (ref 74–97)
MONOCYTES # BLD: 0.6 K/UL (ref 0.05–1.2)
MONOCYTES NFR BLD: 8 % (ref 3–10)
NEUTS SEG # BLD: 4.3 K/UL (ref 1.8–8)
NEUTS SEG NFR BLD: 57 % (ref 40–73)
PLATELET # BLD AUTO: 235 K/UL (ref 135–420)
PMV BLD AUTO: 10 FL (ref 9.2–11.8)
POTASSIUM SERPL-SCNC: 3.9 MMOL/L (ref 3.5–5.5)
PROT SERPL-MCNC: 6.2 G/DL (ref 6.4–8.2)
RBC # BLD AUTO: 4.33 M/UL (ref 4.2–5.3)
SODIUM SERPL-SCNC: 140 MMOL/L (ref 136–145)
WBC # BLD AUTO: 7.6 K/UL (ref 4.6–13.2)

## 2021-07-06 PROCEDURE — 74011000250 HC RX REV CODE- 250: Performed by: HOSPITALIST

## 2021-07-06 PROCEDURE — 74011250636 HC RX REV CODE- 250/636: Performed by: HOSPITALIST

## 2021-07-06 PROCEDURE — 99218 HC RM OBSERVATION: CPT

## 2021-07-06 PROCEDURE — 80053 COMPREHEN METABOLIC PANEL: CPT

## 2021-07-06 PROCEDURE — 77030040361 HC SLV COMPR DVT MDII -B: Performed by: INTERNAL MEDICINE

## 2021-07-06 PROCEDURE — 77030039961 HC KT CUST COLON BSC -D: Performed by: INTERNAL MEDICINE

## 2021-07-06 PROCEDURE — 36415 COLL VENOUS BLD VENIPUNCTURE: CPT

## 2021-07-06 PROCEDURE — 88305 TISSUE EXAM BY PATHOLOGIST: CPT

## 2021-07-06 PROCEDURE — 85025 COMPLETE CBC W/AUTO DIFF WBC: CPT

## 2021-07-06 PROCEDURE — 74011250637 HC RX REV CODE- 250/637: Performed by: HOSPITALIST

## 2021-07-06 PROCEDURE — 2709999900 HC NON-CHARGEABLE SUPPLY: Performed by: INTERNAL MEDICINE

## 2021-07-06 PROCEDURE — 76040000007: Performed by: INTERNAL MEDICINE

## 2021-07-06 PROCEDURE — 96376 TX/PRO/DX INJ SAME DRUG ADON: CPT

## 2021-07-06 PROCEDURE — C9113 INJ PANTOPRAZOLE SODIUM, VIA: HCPCS | Performed by: HOSPITALIST

## 2021-07-06 PROCEDURE — 74011250636 HC RX REV CODE- 250/636: Performed by: INTERNAL MEDICINE

## 2021-07-06 PROCEDURE — 93976 VASCULAR STUDY: CPT

## 2021-07-06 PROCEDURE — G0500 MOD SEDAT ENDO SERVICE >5YRS: HCPCS | Performed by: INTERNAL MEDICINE

## 2021-07-06 RX ORDER — MIDAZOLAM HYDROCHLORIDE 1 MG/ML
.25-5 INJECTION, SOLUTION INTRAMUSCULAR; INTRAVENOUS
Status: CANCELLED | OUTPATIENT
Start: 2021-07-06 | End: 2021-07-06

## 2021-07-06 RX ORDER — EPINEPHRINE 0.1 MG/ML
1 INJECTION INTRACARDIAC; INTRAVENOUS
Status: CANCELLED | OUTPATIENT
Start: 2021-07-06 | End: 2021-07-07

## 2021-07-06 RX ORDER — MIDAZOLAM HYDROCHLORIDE 1 MG/ML
INJECTION, SOLUTION INTRAMUSCULAR; INTRAVENOUS AS NEEDED
Status: DISCONTINUED | OUTPATIENT
Start: 2021-07-06 | End: 2021-07-07 | Stop reason: HOSPADM

## 2021-07-06 RX ORDER — SODIUM CHLORIDE 9 MG/ML
1000 INJECTION, SOLUTION INTRAVENOUS CONTINUOUS
Status: CANCELLED | OUTPATIENT
Start: 2021-07-06 | End: 2021-07-06

## 2021-07-06 RX ORDER — DIPHENHYDRAMINE HYDROCHLORIDE 50 MG/ML
50 INJECTION, SOLUTION INTRAMUSCULAR; INTRAVENOUS ONCE
Status: CANCELLED | OUTPATIENT
Start: 2021-07-06 | End: 2021-07-06

## 2021-07-06 RX ORDER — SODIUM CHLORIDE 0.9 % (FLUSH) 0.9 %
5-40 SYRINGE (ML) INJECTION AS NEEDED
Status: CANCELLED | OUTPATIENT
Start: 2021-07-06

## 2021-07-06 RX ORDER — FENTANYL CITRATE 50 UG/ML
INJECTION, SOLUTION INTRAMUSCULAR; INTRAVENOUS AS NEEDED
Status: DISCONTINUED | OUTPATIENT
Start: 2021-07-06 | End: 2021-07-07 | Stop reason: HOSPADM

## 2021-07-06 RX ORDER — ATROPINE SULFATE 0.1 MG/ML
0.5 INJECTION INTRAVENOUS
Status: CANCELLED | OUTPATIENT
Start: 2021-07-06 | End: 2021-07-07

## 2021-07-06 RX ORDER — SODIUM CHLORIDE 0.9 % (FLUSH) 0.9 %
5-40 SYRINGE (ML) INJECTION EVERY 8 HOURS
Status: CANCELLED | OUTPATIENT
Start: 2021-07-06

## 2021-07-06 RX ORDER — FENTANYL CITRATE 50 UG/ML
100 INJECTION, SOLUTION INTRAMUSCULAR; INTRAVENOUS
Status: CANCELLED | OUTPATIENT
Start: 2021-07-06 | End: 2021-07-06

## 2021-07-06 RX ORDER — AMLODIPINE BESYLATE 5 MG/1
5 TABLET ORAL DAILY
Status: DISCONTINUED | OUTPATIENT
Start: 2021-07-06 | End: 2021-07-07 | Stop reason: HOSPADM

## 2021-07-06 RX ORDER — SODIUM CHLORIDE 9 MG/ML
100 INJECTION, SOLUTION INTRAVENOUS CONTINUOUS
Status: DISCONTINUED | OUTPATIENT
Start: 2021-07-06 | End: 2021-07-06 | Stop reason: HOSPADM

## 2021-07-06 RX ORDER — FLUMAZENIL 0.1 MG/ML
0.2 INJECTION INTRAVENOUS
Status: CANCELLED | OUTPATIENT
Start: 2021-07-06 | End: 2021-07-06

## 2021-07-06 RX ORDER — DEXTROMETHORPHAN/PSEUDOEPHED 2.5-7.5/.8
1.2 DROPS ORAL
Status: CANCELLED | OUTPATIENT
Start: 2021-07-06

## 2021-07-06 RX ORDER — METOPROLOL TARTRATE 50 MG/1
50 TABLET ORAL 2 TIMES DAILY
Status: DISCONTINUED | OUTPATIENT
Start: 2021-07-06 | End: 2021-07-07 | Stop reason: HOSPADM

## 2021-07-06 RX ORDER — SODIUM CHLORIDE 9 MG/ML
1000 INJECTION, SOLUTION INTRAVENOUS CONTINUOUS
Status: CANCELLED | OUTPATIENT
Start: 2021-07-06

## 2021-07-06 RX ORDER — NALOXONE HYDROCHLORIDE 0.4 MG/ML
0.4 INJECTION, SOLUTION INTRAMUSCULAR; INTRAVENOUS; SUBCUTANEOUS
Status: CANCELLED | OUTPATIENT
Start: 2021-07-06 | End: 2021-07-06

## 2021-07-06 RX ORDER — SUCRALFATE 1 G/1
1 TABLET ORAL
Status: DISCONTINUED | OUTPATIENT
Start: 2021-07-06 | End: 2021-07-07 | Stop reason: HOSPADM

## 2021-07-06 RX ADMIN — SODIUM CHLORIDE 75 ML/HR: 900 INJECTION, SOLUTION INTRAVENOUS at 13:06

## 2021-07-06 RX ADMIN — SUCRALFATE 1 G: 1 TABLET ORAL at 21:10

## 2021-07-06 RX ADMIN — METOPROLOL TARTRATE 50 MG: 50 TABLET, FILM COATED ORAL at 21:10

## 2021-07-06 RX ADMIN — METOPROLOL TARTRATE 100 MG: 50 TABLET, FILM COATED ORAL at 08:43

## 2021-07-06 RX ADMIN — HYDROCHLOROTHIAZIDE 25 MG: 25 TABLET ORAL at 08:43

## 2021-07-06 RX ADMIN — ASPIRIN 325 MG ORAL TABLET 325 MG: 325 PILL ORAL at 15:43

## 2021-07-06 RX ADMIN — SODIUM CHLORIDE 100 ML/HR: 900 INJECTION, SOLUTION INTRAVENOUS at 11:56

## 2021-07-06 RX ADMIN — AMLODIPINE BESYLATE 5 MG: 5 TABLET ORAL at 09:16

## 2021-07-06 RX ADMIN — ATORVASTATIN CALCIUM 10 MG: 10 TABLET, FILM COATED ORAL at 21:10

## 2021-07-06 RX ADMIN — MORPHINE SULFATE 1 MG: 2 INJECTION, SOLUTION INTRAMUSCULAR; INTRAVENOUS at 06:10

## 2021-07-06 RX ADMIN — SODIUM CHLORIDE 40 MG: 9 INJECTION, SOLUTION INTRAMUSCULAR; INTRAVENOUS; SUBCUTANEOUS at 21:10

## 2021-07-06 RX ADMIN — SUCRALFATE 1 G: 1 TABLET ORAL at 15:41

## 2021-07-06 RX ADMIN — SODIUM CHLORIDE 40 MG: 9 INJECTION, SOLUTION INTRAMUSCULAR; INTRAVENOUS; SUBCUTANEOUS at 08:44

## 2021-07-06 RX ADMIN — SPIRONOLACTONE 25 MG: 25 TABLET ORAL at 08:43

## 2021-07-06 NOTE — PROGRESS NOTES
Bedside and verbal report given to BURAK Tolliver RN (oncoming nurse) by Didi Burgess RN  (off going nurse). Report included the following information SBAR, Kardex, OR Summary, Intake/Output, and MAR. PRN Morphine given for pain x1. Lab called to verify orders for the [address]. Stated that they would call back later after day shift doctors are in to make sure that the correct order was put in. Bedside and verbal report given by (off going nurse) BURAK Tolliver RN to (oncoming nurse) ***. Report included the following information SBAR, Kardex, OR Summary, Intake/Output, and MAR.

## 2021-07-06 NOTE — CONSULTS
[address] [facility]    Name:  Gilmer Carrera  MR#:   [id number]  :  1945  ACCOUNT #:  [de-identified]  DATE OF SERVICE:  2021    HISTORY OF PRESENT ILLNESS:  This is a 68-year-old female admitted on 2021 about five days ago because of intractable nausea and vomiting. The patient claimed that she started to vomit a day before her admission and after that on a couple of occasions, she vomited again. She was able to tolerate clear liquids. A CT scan of the abdomen revealed that she has a large hiatal hernia. The patient did also have acute diarrhea. Usually, she has two bowel movements every day. Occult blood in the stool was positive. The patient claims that she had a negative colonoscopy about three months ago by Dr. Taylor Wallace. She has no family history of cancer. She does not smoke or drink alcohol. She denies taking any NSAIDs. She has chronic GERD. She has been on famotidine and Carafate for many years. She denies having any dysphagia or heartburns. There was no hematemesis. She claims that she lost a couple of pounds during this episode. She has been taking aspirin 325 mg because of deep vein thrombosis following a right knee surgery in . She has no history of coronary artery disease or stroke. She is not diabetic. She has a previous GYN surgery and had right breast cancer in . She is in remission. She denies having any chest pain or shortness of breath. MEDICATIONS AT HOME:  1. Flexeril. 2.  Vitamin D3 of 24888 units one pill a week. 3.  Mycostatin. 4.  Zofran 4 mg every eight hours p.r.n.  5.  Ranitidine 300 mg.  6.  Carafate 1 g four times daily. 7.  Aspirin 325.  8.  Atorvastatin 10 mg.  9.  Hydrochlorothiazide 25.  10.  Spironolactone 25 mg. PHYSICAL EXAMINATION:  GENERAL:  We have a 68-year-old [facility] female who appears to be in no distress.   VITAL SIGNS:  She weighs 147 pounds, temperature 97.7, pulse 52, blood pressure 190/91, breathing 20, saturation 100% on room air. SKIN:  Normal.  No evidence of any rash. HEENT:  The eyes are unremarkable. The pupils are equal and reactive to light. The sclerae are anicteric. The conjunctivae are pink. The oropharyngeal cavity is normal with moist and pink mucous membranes. Normal teeth, but few are missing. NECK:  Supple. No palpable mass. No enlarged thyroid. LUNGS:  Clear to auscultation. HEART:  The cardiac rhythm is regular. S1, S2 normal.  No murmur. ABDOMEN:  Nondistended. Soft. There is mild right lower quadrant tenderness, but there is no guarding, mass, or organomegaly. Bowel sounds are normal.  EXTREMITIES:  Unremarkable. NEUROLOGIC:  She is alert and oriented and moves all her four extremities. LABORATORY DATA:  Blood tests:  Hemoglobin is 12.6 with a normal WBC, differential.  Normal urinalysis. Complete metabolic panel is normal with a BUN of 10, creatinine of 1.06. Normal LFTs. CT scan of the abdomen and pelvis reveals presence of a large hiatal hernia; unchanged hepatic and renal cyst; grade II anterolisthesis, L4-L5. The patient told me that she takes Norco as needed for back pain and right hip pain. ASSESSMENT:  In conclusion, this is a 79-year-old female who presented with acute nausea and vomiting, possible related to gastroenteritis in the context of diarrhea. She does have a large hiatal hernia. We are going to check her stomach to make sure there is nothing else. The patient already has been on ranitidine for 10 years for chronic gastroesophageal reflux disease with good control. She has heme-positive stools, but her hemoglobin is normal and already had a negative colonoscopy three months ago by Dr. Lenora Luis. I explained to the patient the procedure of EGD and the risks involved, which include but are not limited to bleeding, perforation, reaction to medications, or not finding the real problem. She agreed to proceed.     Further notes to follow.       MD BRENDAN Sparks/[email or web address]  D:  07/06/2021 12:24  T:  07/06/2021 16:33  JOB #:  [id number]  CC:

## 2021-07-06 NOTE — PROGRESS NOTES
Pt is  and she has indicated her  will assist as needed. Eleanor Blunt encourage ambulation or order therapy services as appropriate to assist with determing potential transition of care needs.  Anticipate pt will transition home with physician follow up when medically stable. CM to continue to follow and assist.    Care Management Interventions  Mode of Transport at Discharge:  Other (see comment) (Family )  Transition of Care Consult (CM Consult): Discharge Planning  Health Maintenance Reviewed: Yes  Current Support Network: Lives with Spouse, Family Lives Nearby  The Plan for Transition of Care is Related to the Following Treatment Goals : Home with physician follow up   Discharge Location  Discharge Placement: Home with family assistance

## 2021-07-06 NOTE — PROGRESS NOTES
Problem: Falls - Risk of  Goal: *Absence of Falls  Description: Document Rad Rice Fall Risk and appropriate interventions in the flowsheet.   7/6/2021 0920 by Lilibeth Walker RN  Outcome: Progressing Towards Goal  Note: Fall Risk Interventions:            Medication Interventions: Patient to call before getting OOB                7/6/2021 0919 by Lilibeth Walker RN  Outcome: Progressing Towards Goal  Note: Fall Risk Interventions:            Medication Interventions: Patient to call before getting OOB                   Problem: Patient Education: Go to Patient Education Activity  Goal: Patient/Family Education  7/6/2021 0920 by Lilibeth Walker RN  Outcome: Progressing Towards Goal  7/6/2021 0919 by Lilibeth Walker RN  Outcome: Progressing Towards Goal     Problem: Pain  Goal: *Control of Pain  7/6/2021 0920 by Lilibeth Walker RN  Outcome: Progressing Towards Goal  7/6/2021 0919 by Lilibeth Walker RN  Outcome: Progressing Towards Goal     Problem: Nausea/Vomiting (Adult)  Goal: *Absence of nausea/vomiting  Outcome: Progressing Towards Goal     Problem: Patient Education: Go to Patient Education Activity  Goal: Patient/Family Education  Outcome: Progressing Towards Goal

## 2021-07-06 NOTE — PROGRESS NOTES
Problem: Falls - Risk of  Goal: *Absence of Falls  Description: Document Luan Massey Fall Risk and appropriate interventions in the flowsheet.   Outcome: Progressing Towards Goal  Note: Fall Risk Interventions:            Medication Interventions: Teach patient to arise slowly                   Problem: Pain  Goal: *Control of Pain  Outcome: Progressing Towards Goal

## 2021-07-06 NOTE — PROCEDURES
(EGD) Esophagogastroduodenoscopy (UPPER ENDOSCOPY) Procedure Note  [facility] [place]  __________________________________________________________________________________________________________________________      7/6/2021     Patient: Raghavendra Hunter YOB: 1945 Gender: female Age: 78 y.o. INDICATION:  Chronic GERD on Ranitidine 300 mg daily and Carafate qid. Has been taking  mg for DVT for 17 years. She presented with intractable nausea and vomiting for 2 days then resolved. CT scan of the abdomen. Large hiatal hernia    : Ena Schneider MD    Referring Provider:  Eric Crystal MD    Sedation:  Versed 3 mg IV, Fentanyl 100 mcg IV    Procedure Details:  After infomed consent was obtained for the procedure, with all risks and benefits of procedure explained to the family the patient was taken to the endoscopy suite and placed in the left lateral decubitus position. Following sequential administration of sedation as per above, the endoscope was inserted into the mouth and advanced under direct vision to third portion of the duodenum. A careful inspection was made as the gastroscope was withdrawn, including a retroflexed view of the proximal stomach; findings and interventions are described below. OROPHARYNX: The vocal Cords and the larynx are normal.   ESOPHAGUS: The proximal, mid, and distal oesophagus are normal. The Z-Line is intact located at: 30 cm. Huge 8 cm Hiatal hernia. Diaphragmatic opening or notch is large located at 38 cm. STOMACH: No evidence of blood, fluid or solid food retention. The fundus on antegrade and retroflex views is normal. The cardia, body, lesser curvature, greater curvature, the antrum, and pylorus are normal. The gastric mucosa is normal.  DUODENUM: There are multiple superficial ulcers and ulcerations in the distal bulb and post bulbar 2 are present of the major papilla. 5 biopsies taken from D1, 2 and 3.  The third, fourth portions are normal.  PROXIMAL JEJUNUM:  Not examined. Therapies:  Duodenal and gastric biopsies    Specimen: Two           Complications:   None    EBL:  Negligible. IMPLANTS: * No implants in log *  IMPRESSION: Huge 8 cm Hiatal hernia. Diaphragmatic opening or notch is large located at 38 cm. No evidence of blood, fluid or solid food retention. he gastric mucosa is normal 4 bx taken. Multiple superficial ulcers and ulcerations in the distal bulb and post bulbar 2 are present of the major papilla. 5 biopsies taken from D1, 2 and 3. RECOMMENDATION:  May resume antireflux soft diet. Avoid all NSAID's. Make a FU appointment at the office to . Start taking Pantoprazole 40 mg daily. Continue Carafate.      Assistant: None    --Tony Cabello MD on 7/6/2021 at 12:06 PM

## 2021-07-06 NOTE — PROGRESS NOTES
Hospitalist Progress Note    Patient: Danita Mcclendon MRN: [id number]  CSN: [id number]    YOB: 1945  Age: 78 y.o. Sex: female    DOA: 7/1/2021 LOS:  LOS: 0 days                Assessment/Plan     Patient Active Problem List   Diagnosis Code    HTN (hypertension) I10    Diverticulosis of colon K57.30    Internal hemorrhoids K64.8    GERD (gastroesophageal reflux disease) K21.9    HLD (hyperlipidemia) K12.3    Diastolic CHF, chronic (HCC) I50.32    Proctitis K62.89    Hypoalbuminemia E88.09    Intractable vomiting R11.10    Hiatal hernia with GERD K21.9, K44.9    Hypertension I10    High cholesterol E78.00    Hyponatremia E87.1    Right lower quadrant abdominal pain R10.31        78 y.o. female with hypertension, hyperlipidemia, hiatal hernia, GERD, breast cancer presents to ER with concerns of abdominal pain, nausea/vomiting. Intractable nausea vomiting-  Unclear etiology, possibly secondary to large hiatal hernia. Antiemetics as needed. CT abdomen pelvis with no evidence of obstruction. Resolved  Will start on regular diet     Abdominal pain-  Her abdominal pain and. physical exam is disproportionate to CT findings. Seen by general surgery. Abdominal US with no acute findings,   Likely gastroenteritis. GI bleed -  For EGD today     Diarrhea-  Resolved    Heme positive stools -  GI consulted     Hyponatremia-  resolved     Hypertension-  Continue with home medications, monitor blood pressure. Added amlodipine    Bradycardic -  Will decreased metoprolol succinate dose.     GERD-  Continue with antacid medications.        DVT prophylaxis with SCD    Disposition : 1-2 days    Review of systems  General: No fevers or chills. Cardiovascular: No chest pain or pressure. No palpitations. Pulmonary: No shortness of breath. Gastrointestinal: see above. Physical Exam:  General: Awake, cooperative, no acute distress    HEENT: NC, Atraumatic.   PERRLA, anicteric sclerae. Lungs: CTA Bilaterally. No Wheezing/Rhonchi/Rales. Heart:  S1 S2,  No murmur, No Rubs, No Gallops  Abdomen: Soft, Non distended, gen tender.  +Bowel sounds,   Extremities: No c/c/e  Psych:   Not anxious or agitated. Neurologic:  No acute neurological deficit. Vital signs/Intake and Output:  Visit Vitals  BP (!) 169/86   Pulse (!) 51   Temp 97.7 °F (36.5 °C)   Resp 20   Ht 4' 11\" (1.499 m)   Wt 66.8 kg (147 lb 4.8 oz)   SpO2 94%   BMI 29.75 kg/m²     Current Shift:  No intake/output data recorded. Last three shifts:  07/04 1901 - 07/06 0700  In: 1880 [P.O.:980; I.V.:900]  Out: 2200 [Urine:2200]            Labs: Results:       Chemistry Recent Labs     07/06/21  0330 07/05/21 0510 07/04/21 0452   GLU 76 76 72*    140 138   K 3.9 3.6 3.8    107 106   CO2 27 25 25   BUN 10 11 14   CREA 1.06 1.02 1.12   CA 9.3 8.7 9.2   AGAP 7 8 7   BUCR 9* 11* 13   AP 65 58 65   TP 6.2* 6.0* 6.4   ALB 3.0* 2.8* 3.0*   GLOB 3.2 3.2 3.4   AGRAT 0.9 0.9 0.9      CBC w/Diff Recent Labs     07/06/21 0330 07/05/21  0510 07/04/21  0452   WBC 7.6 7.7 9.1   RBC 4.33 4.23 4.49   HGB 12.6 12.5 13.2   HCT 39.2 38.5 40.4    227 239   GRANS 57 58 60   LYMPH 33 33 31   EOS 1 1 1      Cardiac Enzymes No results for input(s): CPK, CKND1, CHRISTINA in the last 72 hours. No lab exists for component: CKRMB, TROIP   Coagulation No results for input(s): PTP, INR, APTT, INREXT, INREXT in the last 72 hours. Lipid Panel No results found for: CHOL, CHOLPOCT, CHOLX, CHLST, CHOLV, 947156, HDL, HDLP, LDL, LDLC, DLDLP, 993354, VLDLC, VLDL, TGLX, TRIGL, TRIGP, TGLPOCT, CHHD, CHHDX   BNP No results for input(s): BNPP in the last 72 hours.    Liver Enzymes Recent Labs     07/06/21  0330   TP 6.2*   ALB 3.0*   AP 65      Thyroid Studies No results found for: T4, T3U, TSH, TSHEXT, TSHEXT     Procedures/imaging: see electronic medical records for all procedures/Xrays and details which were not copied into this note but were reviewed prior to creation of Plan

## 2021-07-06 NOTE — PROGRESS NOTES
Comprehensive Nutrition Assessment    Type and Reason for Visit: Initial, RD nutrition re-screen/LOS    Nutrition Recommendations/Plan: Diet: advance diet per MD to avoid prolong NPO status    Nutrition Assessment:  Pt admitted for intractable vomiting possibly secondary to large hiatal hernia, diarrhea-resolved, hyponatremia-revoled, hx of HTN, hx of GERD. Estimated Daily Nutrient Needs:  Energy (kcal): 3136-2546; Weight Used for Energy Requirements: Current  Protein (g): 53-67; Weight Used for Protein Requirements: Current (0.8-1g)  Fluid (ml/day): 8900-4464; Method Used for Fluid Requirements: 1 ml/kcal    Nutrition Related Findings:  Labs and meds reviewed: potonix. +BM 7/4-loose. Pt started NPO this AM.      Wounds:    None       Current Nutrition Therapies:  DIET NPO    Anthropometric Measures:  · Height:  4' 11\" (149.9 cm)  · Current Body Wt:  66.8 kg (147 lb 4.3 oz)   · Admission Body Wt:  140 lb    · Usual Body Wt:  63.5 kg (140 lb) (6/4/2021)     · Ideal Body Wt:  95 lbs:  155 %   · Adjusted Body Weight:   ; Weight Adjustment for: No adjustment   · Adjusted BMI:       · BMI Category: Overweight (BMI 25.0-29. 9)       Nutrition Diagnosis:   · Inadequate oral intake related to acute injury/trauma as evidenced by NPO or clear liquid status due to medical condition    Nutrition Interventions:   Food and/or Nutrient Delivery: Start oral diet  Nutrition Education and Counseling: No recommendations at this time  Coordination of Nutrition Care: Continue to monitor while inpatient    Goals:  Start diet within the next 1-3 days to meet nutritional needs. Nutrition Monitoring and Evaluation:   Behavioral-Environmental Outcomes: None identified  Food/Nutrient Intake Outcomes: Diet advancement/tolerance  Physical Signs/Symptoms Outcomes: Biochemical data, Skin, Weight, GI status, Nausea/vomiting    Discharge Planning:     Too soon to determine     Electronically signed by Jean-Pierre Mederos RD on 7/6/2021 at 10:36 AM

## 2021-07-06 NOTE — PERIOP NOTES
Procedure complete. Patient received 3mg of versed, 100mcg of fentanyl and 300mL of normal saline. Report called to Unit RN. Patient is drowsy but arousable VSS. Patient transported by hospital transportation team back to unit.

## 2021-07-07 VITALS
HEART RATE: 62 BPM | TEMPERATURE: 97.9 F | BODY MASS INDEX: 29.64 KG/M2 | HEIGHT: 59 IN | WEIGHT: 147 LBS | SYSTOLIC BLOOD PRESSURE: 134 MMHG | RESPIRATION RATE: 17 BRPM | DIASTOLIC BLOOD PRESSURE: 69 MMHG | OXYGEN SATURATION: 97 %

## 2021-07-07 PROBLEM — E87.1 HYPONATREMIA: Status: RESOLVED | Noted: 2021-07-01 | Resolved: 2021-07-07

## 2021-07-07 PROBLEM — R11.10 INTRACTABLE VOMITING: Status: RESOLVED | Noted: 2021-07-01 | Resolved: 2021-07-07

## 2021-07-07 PROBLEM — R10.31 RIGHT LOWER QUADRANT ABDOMINAL PAIN: Status: RESOLVED | Noted: 2021-07-01 | Resolved: 2021-07-07

## 2021-07-07 LAB
H PYLORI IGA SER-ACNC: <9 UNITS (ref 0–8.9)
H PYLORI IGG SER IA-ACNC: 0.38 INDEX VALUE (ref 0–0.79)

## 2021-07-07 PROCEDURE — 74011250637 HC RX REV CODE- 250/637: Performed by: HOSPITALIST

## 2021-07-07 PROCEDURE — 74011250636 HC RX REV CODE- 250/636: Performed by: HOSPITALIST

## 2021-07-07 PROCEDURE — 74011000250 HC RX REV CODE- 250: Performed by: HOSPITALIST

## 2021-07-07 PROCEDURE — 99218 HC RM OBSERVATION: CPT

## 2021-07-07 PROCEDURE — C9113 INJ PANTOPRAZOLE SODIUM, VIA: HCPCS | Performed by: HOSPITALIST

## 2021-07-07 RX ORDER — AMLODIPINE BESYLATE 5 MG/1
5 TABLET ORAL DAILY
Qty: 30 TABLET | Refills: 2 | Status: SHIPPED | OUTPATIENT
Start: 2021-07-08

## 2021-07-07 RX ORDER — PANTOPRAZOLE SODIUM 40 MG/1
40 TABLET, DELAYED RELEASE ORAL DAILY
Qty: 30 TABLET | Refills: 5 | Status: SHIPPED | OUTPATIENT
Start: 2021-07-07

## 2021-07-07 RX ORDER — ASPIRIN 81 MG/1
81 TABLET ORAL DAILY
Qty: 30 TABLET | Refills: 5 | Status: SHIPPED | OUTPATIENT
Start: 2021-07-07

## 2021-07-07 RX ORDER — METOPROLOL TARTRATE 50 MG/1
50 TABLET ORAL 2 TIMES DAILY
Qty: 30 TABLET | Refills: 2 | Status: SHIPPED | OUTPATIENT
Start: 2021-07-07

## 2021-07-07 RX ORDER — SUCRALFATE 1 G/1
1 TABLET ORAL 4 TIMES DAILY
Qty: 120 TABLET | Refills: 2 | Status: SHIPPED | OUTPATIENT
Start: 2021-07-07

## 2021-07-07 RX ADMIN — SUCRALFATE 1 G: 1 TABLET ORAL at 06:46

## 2021-07-07 RX ADMIN — HYDROCHLOROTHIAZIDE 25 MG: 25 TABLET ORAL at 08:48

## 2021-07-07 RX ADMIN — SUCRALFATE 1 G: 1 TABLET ORAL at 11:15

## 2021-07-07 RX ADMIN — SPIRONOLACTONE 25 MG: 25 TABLET ORAL at 08:48

## 2021-07-07 RX ADMIN — AMLODIPINE BESYLATE 5 MG: 5 TABLET ORAL at 08:49

## 2021-07-07 RX ADMIN — METOPROLOL TARTRATE 50 MG: 50 TABLET, FILM COATED ORAL at 08:49

## 2021-07-07 RX ADMIN — ASPIRIN 325 MG ORAL TABLET 325 MG: 325 PILL ORAL at 08:48

## 2021-07-07 RX ADMIN — MORPHINE SULFATE 1 MG: 2 INJECTION, SOLUTION INTRAMUSCULAR; INTRAVENOUS at 08:49

## 2021-07-07 RX ADMIN — SODIUM CHLORIDE 40 MG: 9 INJECTION, SOLUTION INTRAMUSCULAR; INTRAVENOUS; SUBCUTANEOUS at 08:49

## 2021-07-07 NOTE — PROGRESS NOTES
0732 Bedside and verbal shift change report given to [address], RN (on coming nurse) by Bethany Calvillo RN (off going nurse). Report included the following information SBAR, Kardex, OR Summary, Intake/Output and MAR.

## 2021-07-07 NOTE — DISCHARGE SUMMARY
Discharge Summary    Patient: Dayday Ulloa MRN: [id number]  CSN: [id number]    YOB: 1945  Age: 78 y.o. Sex: female    DOA: 7/1/2021 LOS:  LOS: 0 days   Discharge Date:      Primary Care Provider:  Vidhya Uriarte MD    Admission Diagnoses: Intractable vomiting [R11.10]  Hiatal hernia with GERD [K21.9, K44.9]    Discharge Diagnoses:    Problem List as of 7/7/2021 Date Reviewed: 1/15/2018        Codes Class Noted - Resolved    Hiatal hernia with GERD ICD-10-CM: K21.9, K44.9  ICD-9-CM: 530.81, 553.3  7/1/2021 - Present        High cholesterol ICD-10-CM: E78.00  ICD-9-CM: 272.0  Unknown - Present        Hypoalbuminemia ICD-10-CM: E88.09  ICD-9-CM: 273.8  1/25/2018 - Present        Proctitis ICD-10-CM: K62.89  ICD-9-CM: 569.49  1/17/2018 - Present        GERD (gastroesophageal reflux disease) ICD-10-CM: K21.9  ICD-9-CM: 530.81  1/15/2018 - Present        HLD (hyperlipidemia) ICD-10-CM: E78.5  ICD-9-CM: 272.4  1/15/2018 - Present        Di[facility] ICD-10-CM: I50.32  ICD-9-CM: 428.32, 428.0  1/15/2018 - Present        Diverticulosis of colon ICD-10-CM: K57.30  ICD-9-CM: 562.10  1/17/2014 - Present        Internal hemorrhoids ICD-10-CM: K64.8  ICD-9-CM: 455.0  1/17/2014 - Present        HTN (hypertension) ICD-10-CM: I10  ICD-9-CM: 401.9  11/4/2013 - Present        * (Principal) RESOLVED: Intractable vomiting ICD-10-CM: R11.10  ICD-9-CM: 536.2  7/1/2021 - 7/7/2021        RESOLVED: Hyponatremia ICD-10-CM: E87.1  ICD-9-CM: 276.1  7/1/2021 - 7/7/2021        RESOLVED: Right lower quadrant abdominal pain ICD-10-CM: R10.31  ICD-9-CM: 789.03  7/1/2021 - 7/7/2021              Discharge Medications:     Current Discharge Medication List      START taking these medications    Details   metoprolol tartrate (LOPRESSOR) 50 mg tablet Take 1 Tablet by mouth two (2) times a day.   Qty: 30 Tablet, Refills: 2  Start date: 7/7/2021      amLODIPine (NORVASC) 5 mg tablet Take 1 Tablet by mouth daily.  Qty: 30 Tablet, Refills: 2  Start date: 7/8/2021      pantoprazole (Protonix) 40 mg tablet Take 1 Tablet by mouth daily. Qty: 30 Tablet, Refills: 5  Start date: 7/7/2021      aspirin delayed-release 81 mg tablet Take 1 Tablet by mouth daily. Qty: 30 Tablet, Refills: 5  Start date: 7/7/2021         CONTINUE these medications which have CHANGED    Details   sucralfate (CARAFATE) 1 gram tablet Take 1 Tablet by mouth four (4) times daily. Qty: 120 Tablet, Refills: 2  Start date: 7/7/2021         CONTINUE these medications which have NOT CHANGED    Details   hydroCHLOROthiazide (HYDRODIURIL) 25 mg tablet Take 1 Tab by mouth daily. Qty: 30 Tab, Refills: 0      spironolactone (ALDACTONE) 25 mg tablet Take 1 Tab by mouth daily. Qty: 30 Tab, Refills: 0      ergocalciferol (VITAMIN D2) 50,000 unit capsule Take 50,000 Units by mouth every seven (7) days. Dr. Kailee Menjivar with [facility]  Indications: Vitamin D Deficiency      lovastatin (MEVACOR) 40 mg tablet Take 40 mg by mouth nightly.          STOP taking these medications       metoprolol ta-hydrochlorothiaz (LOPRESSOR HCT) 100-50 mg per tablet Comments:   Reason for Stopping:         aspirin (ASPIRIN) 325 mg tablet Comments:   Reason for Stopping:         ondansetron (ZOFRAN ODT) 4 mg disintegrating tablet Comments:   Reason for Stopping:         raNITIdine hcl 300 mg cap Comments:   Reason for Stopping:         nystatin [facility]) powder Comments:   Reason for Stopping:         nystatin (MYCOSTATIN) powder Comments:   Reason for Stopping:         cyclobenzaprine (FLEXERIL) 10 mg tablet Comments:   Reason for Stopping:         famotidine (PEPCID) 20 mg tablet Comments:   Reason for Stopping:               Discharge Condition: Good    Procedures : EGD    Consults: Gastroenterology and General Surgery      PHYSICAL EXAM   Visit Vitals  /69 (BP 1 Location: Left upper arm, BP Patient Position: At rest)   Pulse 62   Temp 97.9 °F (36.6 °C)   Resp 17   Ht 4' 11\" (1.499 m)   Wt 66.7 kg (147 lb)   SpO2 97%   BMI 29.69 kg/m²     General: Awake, cooperative, no acute distress    HEENT: NC, Atraumatic. PERRLA, EOMI. Anicteric sclerae. Lungs:  CTA Bilaterally. No Wheezing/Rhonchi/Rales. Heart:  Regular  rhythm,  No murmur, No Rubs, No Gallops  Abdomen: Soft, Non distended, Non tender. +Bowel sounds,   Extremities: No c/c/e  Psych:   Not anxious or agitated. Neurologic:  No acute neurological deficits. Admission HPI :   John Shin is a 78 y.o. female with hypertension, hyperlipidemia, hiatal hernia, GERD, breast cancer presents to ER with concerns of abdominal pain, nausea/vomiting. She has been having abdominal pain for the past few days. Has been having persistent nausea and vomiting. She also reports that she had diarrhea for the past 2 days. She is a poor historian, in the ER she was noted to have intractable nausea and vomiting. She continued to have heaves. Her pain is located on the right side of the abdomen. In ER her CT of abdomen pelvis did not show any acute findings except for liver and right renal cyst.  Her labs relatively normal range, lipase in normal range. Given her intractable vomiting and significant abdominal pain she is being admitted. Hospital Course :   Ms. Tiffanie Araiza was admitted to medical floor, she was seen by general surgery and GI. She did not had any acute events during hospitalization. Intractable nausea vomiting-  Likely gastroenteritis, now resolved. Used Antiemetics as needed.  CT abdomen pelvis with no evidence of obstruction. Now tolerating regular diet     Abdominal pain-  Her abdominal pain and. physical exam was disproportionate to CT findings. Seen by general surgery. Abdominal US with no acute findings,   Likely gastroenteritis.    Now pain resolved     GI bleed/GERD -  s/p EGD with no evidence of bleeding, showed multiple superficial ulcers in the distal bulb and post bulbar. GI recommend protonix and continue with carafate  She had colonoscopy 3 months ago.     Diarrhea-  Resolved     Hyponatremia-  resolved     Hypertension-  Continued with home medications, monitor blood pressure. Added amlodipine     Bradycardic -  Will decreased metoprolol succinate dose from 100 mg to 50 mg daily.     Discussed with daughter on phone. Activity: Activity as tolerated    Diet: Regular Diet    Follow-up: PCP    Disposition: home with home health    Minutes spent on discharge: 45       Labs: Results:       Chemistry Recent Labs     07/06/21  0330 07/05/21  0510   GLU 76 76    140   K 3.9 3.6    107   CO2 27 25   BUN 10 11   CREA 1.06 1.02   CA 9.3 8.7   AGAP 7 8   BUCR 9* 11*   AP 65 58   TP 6.2* 6.0*   ALB 3.0* 2.8*   GLOB 3.2 3.2   AGRAT 0.9 0.9      CBC w/Diff Recent Labs     07/06/21  0330 07/05/21  0510   WBC 7.6 7.7   RBC 4.33 4.23   HGB 12.6 12.5   HCT 39.2 38.5    227   GRANS 57 58   LYMPH 33 33   EOS 1 1      Cardiac Enzymes No results for input(s): CPK, CKND1, CHRISTINA in the last 72 hours. No lab exists for component: CKRMB, TROIP   Coagulation No results for input(s): PTP, INR, APTT, INREXT, INREXT in the last 72 hours. Lipid Panel No results found for: CHOL, CHOLPOCT, CHOLX, CHLST, CHOLV, 119833, HDL, HDLP, LDL, LDLC, DLDLP, 899017, VLDLC, VLDL, TGLX, TRIGL, TRIGP, TGLPOCT, CHHD, CHHDX   BNP No results for input(s): BNPP in the last 72 hours. Liver Enzymes Recent Labs     07/06/21  0330   TP 6.2*   ALB 3.0*   AP 65      Thyroid Studies No results found for: T4, T3U, TSH, TSHEXT, TSHEXT         Significant Diagnostic Studies: CT ABD PELV W CONT    Result Date: 7/1/2021  EXAM: CT of the abdomen and pelvis INDICATION: Nausea, vomiting, blood in stool. COMPARISON: CT June 29, 2021, 1/18/2018 TECHNIQUE: Axial CT imaging of the abdomen and pelvis was performed with intravenous contrast. Multiplanar reformats were generated.  One or more dose reduction techniques were used on this CT: automated exposure control, adjustment of the mAs and/or kVp according to patient size, and iterative reconstruction techniques. The specific techniques used on this CT exam have been documented in the patient's electronic medical record. Digital Imaging and Communications in Medicine (DICOM) format image data are available to nonaffiliated external healthcare facilities or entities on a secure, media free, reciprocally searchable basis with patient authorization for at least a 12-month period after this study. _______________ FINDINGS: LOWER CHEST: Mild bibasilar atelectasis without evidence of alveolar consolidation or pleural effusion. Cardiac size is within normal limits. Multivessel coronary arterial atherosclerotic vascular calcifications. LIVER, BILIARY: Redemonstration of multiple hepatic hypodensities, either too small to accurately characterize, or in keeping with simple cysts. No biliary dilation. Gallbladder is unremarkable. PANCREAS: Normal. SPLEEN: Normal. ADRENALS: Normal. KIDNEYS/URETERS/BLADDER: Symmetric renal enhancement without hydronephrosis. Bilateral renal hypodensities, either too small to accurately characterize are in keeping with simple cysts. These appear unchanged from prior. No urolithiasis. Urinary bladder unremarkable. PELVIC ORGANS: Several calcified and noncalcified uterine leiomyomata. VASCULATURE: Atherosclerosis. No evidence of aneurysmal dilatation. LYMPH NODES: No enlarged lymph nodes. GASTROINTESTINAL TRACT: Large hiatus hernia redemonstrated. No abnormal bowel wall thickening or dilatation. Prior colonic resection and reanastomosis within the right upper quadrant of the abdomen. BONES: No acute or aggressive osseous abnormalities identified. Multilevel spondylosis throughout the lower thoracic and lumbar spine. Grade 2 anterolisthesis L4 on L5. Advanced, end stage osteoarthritis of the left hip joint; all appearing unchanged. OTHER: None. _______________     1. Redemonstration of a large hiatus hernia without clearly acute intra-abdominal or pelvic abnormality demonstrated on today's examination.   > No abnormal bowel wall thickening or inflammatory change appreciated. 2. Unchanged small hepatic and renal cysts. 3. Grade 2 anterolisthesis L4 on L5; advanced, end stage osteoarthritis left hip joint. CT ABD PELV W CONT    Result Date: 6/29/2021  EXAM: CT of the abdomen and pelvis INDICATION: 70-year-old patient with abdominal pain COMPARISON: CT performed 1/18/2018 TECHNIQUE: Axial CT imaging of the abdomen and pelvis was performed with intravenous contrast. Multiplanar reformats were generated. One or more dose reduction techniques were used on this CT: automated exposure control, adjustment of the mAs and/or kVp according to patient size, and iterative reconstruction techniques. The specific techniques used on this CT exam have been documented in the patient's electronic medical record. Digital Imaging and Communications in Medicine (DICOM) format image data are available to nonaffiliated external healthcare facilities or entities on a secure, media free, reciprocally searchable basis with patient authorization for at least a 12-month period after this study. _______________ FINDINGS: LOWER CHEST: Mild basilar changes of atelectasis without alveolar consolidation or pleural effusion. Normal cardiac size. No pericardial effusion. LIVER, BILIARY: Hepatic parenchymal enhancement is uniform. There are scattered hepatic hypodensities redemonstrated which are either too small accurately characterize or are in keeping with simple cysts. No biliary dilation. Gallbladder is unremarkable. PANCREAS: Normal. SPLEEN: Normal. ADRENALS: Normal. KIDNEYS/URETERS/BLADDER: Symmetric renal enhancement. No hydronephrosis.  Multiple bilateral renal hypodensities, some too small to accurately characterize, with others representing simple cysts which require no further dedicated imaging follow-up. Urinary bladder is unremarkable. PELVIC ORGANS: Several calcified and noncalcified uterine leiomyomata. Adnexa appear within normal limits. VASCULATURE: Diffuse atherosclerosis. No evidence of aneurysmal dilatation. LYMPH NODES: No enlarged lymph nodes. GASTROINTESTINAL TRACT: There is a large hiatus hernia. No evidence of perigastric inflammatory change or stranding. Gastroduodenal junction and is below the level of the diaphragm. Evidence of prior colonic resection and reanastomosis. No morphology of bowel obstruction. No free intraperitoneal gas. Normal appendix. BONES: Moderate anterolisthesis of L4 on L5. Multilevel lower thoracic and lumbar spondylosis as well as lower lumbar predominant facet joint osteoarthritis, as previously. 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 OTHER: None. _______________     1. No focal bowel wall thickening or inflammatory change. No morphology of bowel obstruction. Normal appendix. 2. Large hiatus hernia. 3. Mild left basilar atelectasis with otherwise clear lung bases. 4. Grade 2 anterolisthesis L4 on L5 with advanced lower lumbar predominant spondylosis and facet joint osteoarthritis. 5. Advanced, end stage osteoarthritis of the left hip joint    US ABD LTD    Result Date: 7/1/2021  EXAM: Right upper quadrant limited abdominal ultrasound INDICATION: Abdominal pain and rectal bleeding. COMPARISON: CT abdomen pelvis 6/29/2021. _______________ FINDINGS: LIVER: Normal in echotexture. Several small cysts the largest 9 mm, no new focal hepatic mass. Normal direction of blood flow in the portal vein. BILIARY SYSTEM: No intrahepatic biliary dilatation. Common bile duct is normal in caliber measuring 0.4 cm. GALLBLADDER: No gallstones or gallbladder wall thickening. RIGHT KIDNEY: 10.2 cm in length. No hydronephrosis or renal mass. No visible calculi. Several small benign-appearing cysts, the largest 1.5 cm. PANCREAS: Head and body are unremarkable in appearance though the tail is obscured by overlying bowel gas. IVC: Visualized portions are unremarkable in appearance. OTHER: No free intraperitoneal fluid. _______________     1. Unremarkable sonographic exam of the gallbladder, with no new acute findings. 2. Stable small benign-appearing hepatic and right renal cysts. US DPLX ABD VISC A/V LTD    Result Date: 7/6/2021  EXAM: Ultrasound of the Abdomen Indication: Abdominal pain. Clinical concern for stenosis Comparison: Right upper quadrant ultrasound from 7/1/2021, CT scan of the abdomen and pelvis from 7/1/2021 TECHNIQUE: Real-time right upper quadrant sonography in multiple planes was performed with image documentation. Grayscale, color flow Doppler imaging, and velocity spectral waveform analysis of the portal vein, common hepatic artery and hepatic veins was performed (duplex imaging). ______________ FINDINGS: LIVER: Liver measures 13 cm in maximal dimension. Normal echogenicity. No evidence of solid mass. > Main Portal Vein: Measuring 7 mm. Patent . Color flow Doppler and velocity spectral waveform analysis of the portal vein shows normal (hepatopedal) direction of flow. Velocity measurement of 19 cm/second       >> Right portal vein: Measuring 5 mm, with velocity measurement of 16.8 cm/second and normal hepatopedal flow direction. >> Left portal vein: Measuring 6 mm, with velocity measurement of 12 cm/second and normal hepatopedal flow direction.   > HEPATIC VEINS: The right, middle and left hepatic veins are patent. The structures demonstrate Normal triphasic waveform forms and flow direction.   > IVC: Patent.   > COMMON HEPATIC ARTERY: Peak systolic velocity of 77.0 cm/second, spectral waveform demonstrates normal upstroke without tardus parvus morphology.  Elevated resistive indices of 0.81 (normal RI ranging from 0.55-0.7). OTHER: No evidence of ascites. ______________    1. No sonographic findings of hepatic arterial narrowing. 2. Nonspecific elevated, hepatic artery resistive indices of 0.81. Differential considerations are many to include sequela of chronic hepatocellular disease, hepatic venous congestion (acute versus chronic). Normal physiologic etiologies would include postprandial state and advanced patient age. 3. No visualized thrombosis. No results found for this or any previous visit. Please note that this dictation was completed with [employer], the computer voice recognition software. Quite often unanticipated grammatical, syntax, homophones, and other interpretive errors are inadvertently transcribed by the computer software. Please disregard these errors. Please excuse any errors that have escaped final proofreading.      CC: Achilles Skiff, MD

## 2021-07-07 NOTE — PROGRESS NOTES
D/C Plan: [facility]  HH and physician follow up     CM met with pt at bedside to discuss transition of care. Pt has confirmed she has a RW and a cane that she uses to ambulate. Pt has indicated her daughter, Flakito Carmona ([phone]), is looking to get a new PCP. Pt has requested CM contact her daughter Abrahan Hammond, to discuss transition of care. C to continue to follow. 1145:  CM contacted pt's daughter and she has indicated she has made an appointment for this pt with Dr. Ilana Bellamy and would like to see if an earlier appointment can be made with this physician. CMS has been notified to assist. CM discussed [facility] as an option and she is agreeable. CM offered [facility] and pt/family have no preference. [facility]  is able to accept this pt. Anticipate pt will transition home with in the next 24 hours with [facility] and physician follow up. Pt's family will transport her home today. No other transition of care needs have been identified. Care Management Interventions  Mode of Transport at Discharge:  Other (see comment) (Family)  Transition of Care Consult (CM Consult): Discharge Planning, [address]: No  Health Maintenance Reviewed: Yes  Current Support Network: Lives with Spouse, Family Lives Nearby  Confirm Follow Up Transport: Family  The Plan for Transition of Care is Related to the Following Treatment Goals : [facility] and physician follow up   The Patient and/or Patient Representative was Provided with a Choice of Provider and Agrees with the Discharge Plan?: Yes  Name of the Patient Representative Who was Provided with a Choice of Provider and Agrees with the Discharge Plan: daughterMartin Saman of Choice List was Provided with Basic Dialogue that Supports the Patient's Individualized Plan of Care/Goals, Treatment Preferences and Shares the Quality Data Associated with the Providers?: Yes  Discharge Location  Discharge Placement: Home with home health

## 2021-07-07 NOTE — PROGRESS NOTES
Patient was discharge at 1300. AVS reviewed Opportunity for questions and concerns at this time. IV was D/C'd and arm bands removed and disposed of properly.

## 2021-07-07 NOTE — DISCHARGE INSTRUCTIONS
Patient Education        Nausea and Vomiting: Care Instructions  Your Care Instructions     When you are nauseated, you may feel weak and sweaty and notice a lot of saliva in your mouth. Nausea often leads to vomiting. Most of the time you do not need to worry about nausea and vomiting, but they can be signs of other illnesses. Two common causes of nausea and vomiting are stomach flu and food poisoning. Nausea and vomiting from viral stomach flu will usually start to improve within 24 hours. Nausea and vomiting from food poisoning may last from 12 to 48 hours. The doctor has checked you carefully, but problems can develop later. If you notice any problems or new symptoms, get medical treatment right away. Follow-up care is a key part of your treatment and safety. Be sure to make and go to all appointments, and call your doctor if you are having problems. It's also a good idea to know your test results and keep a list of the medicines you take. How can you care for yourself at home? · To prevent dehydration, drink plenty of fluids. Choose water and other caffeine-free clear liquids until you feel better. If you have kidney, heart, or liver disease and have to limit fluids, talk with your doctor before you increase the amount of fluids you drink. · Rest in bed until you feel better. · When you are able to eat, try clear soups, mild foods, and liquids until all symptoms are gone for 12 to 48 hours. Other good choices include dry toast, crackers, cooked cereal, and gelatin dessert, such as Jell-O. When should you call for help? Call 911 anytime you think you may need emergency care. For example, call if:    · You passed out (lost consciousness). Call your doctor now or seek immediate medical care if:    · You have symptoms of dehydration, such as:  ? Dry eyes and a dry mouth. ? Passing only a little urine. ?  Feeling thirstier than usual.     · You have new or worsening belly pain.     · You have a new or higher fever.     · You vomit blood or what looks like coffee grounds. Watch closely for changes in your health, and be sure to contact your doctor if:    · You have ongoing nausea and vomiting.     · Your vomiting is getting worse.     · Your vomiting lasts longer than 2 days.     · You are not getting better as expected. Where can you learn more? Go to http://www.maier.com/  Enter H591 in the search box to learn more about \"Nausea and Vomiting: Care Instructions. \"  Current as of: February 26, 2020               Content Version: 12.8  © 2006-2021 [employer]. Care instructions adapted under license by [facility] (which disclaims liability or warranty for this information). If you have questions about a medical condition or this instruction, always ask your healthcare professional. [address] any warranty or liability for your use of this information.

## 2021-07-07 NOTE — PROGRESS NOTES
Bedside and Verbal shift change report given to Ivory Chi (oncoming nurse) by Trev Cooper (offgoing nurse). Report included the following information SBAR, Kardex, Intake/Output, MAR and Recent Results. Shift Summary-   Patient Vitals for the past 12 hrs:   Temp Pulse Resp BP SpO2   07/07/21 0450 98 °F (36.7 °C) (!) 56 17 139/71 98 %   07/07/21 0002 98.1 °F (36.7 °C) (!) 50 17 (!) 147/72 100 %   07/06/21 2108 99 °F (37.2 °C) (!) 57 18 132/67 100 %   Pt denied pain/discomfort. Pt denied N&V. Pt had uneventful shift. Bedside and Verbal shift change report given to QUINTIN Packer (oncoming nurse) by Ivory Chi (offgoing nurse). Report included the following information SBAR, Kardex, Intake/Output, MAR and Recent Results.

## 2021-08-28 ENCOUNTER — APPOINTMENT (OUTPATIENT)
Dept: CT IMAGING | Age: 76
End: 2021-08-28
Attending: EMERGENCY MEDICINE
Payer: MEDICARE

## 2021-08-28 ENCOUNTER — HOSPITAL ENCOUNTER (EMERGENCY)
Age: 76
Discharge: HOME | End: 2021-08-28
Attending: EMERGENCY MEDICINE
Payer: MEDICARE

## 2021-08-28 VITALS
OXYGEN SATURATION: 99 % | HEIGHT: 59 IN | DIASTOLIC BLOOD PRESSURE: 65 MMHG | RESPIRATION RATE: 16 BRPM | TEMPERATURE: 98.4 F | BODY MASS INDEX: 27.21 KG/M2 | WEIGHT: 135 LBS | SYSTOLIC BLOOD PRESSURE: 125 MMHG | HEART RATE: 62 BPM

## 2021-08-28 DIAGNOSIS — K92.2 LOWER GI BLEED: Primary | ICD-10-CM

## 2021-08-28 LAB
ABO + RH BLD: NORMAL
ALBUMIN SERPL-MCNC: 2.9 G/DL (ref 3.4–5)
ALBUMIN/GLOB SERPL: 0.8 {RATIO} (ref 0.8–1.7)
ALP SERPL-CCNC: 81 U/L (ref 45–117)
ALT SERPL-CCNC: 17 U/L (ref 13–56)
ANION GAP SERPL CALC-SCNC: 11 MMOL/L (ref 3–18)
AST SERPL-CCNC: 28 U/L (ref 10–38)
BASOPHILS # BLD: 0 K/UL (ref 0–0.1)
BASOPHILS NFR BLD: 0 % (ref 0–2)
BILIRUB SERPL-MCNC: 0.6 MG/DL (ref 0.2–1)
BLOOD GROUP ANTIBODIES SERPL: NORMAL
BUN SERPL-MCNC: 15 MG/DL (ref 7–18)
BUN/CREAT SERPL: 12 (ref 12–20)
CALCIUM SERPL-MCNC: 9.5 MG/DL (ref 8.5–10.1)
CHLORIDE SERPL-SCNC: 99 MMOL/L (ref 100–111)
CO2 SERPL-SCNC: 28 MMOL/L (ref 21–32)
CREAT SERPL-MCNC: 1.28 MG/DL (ref 0.6–1.3)
DIFFERENTIAL METHOD BLD: NORMAL
EOSINOPHIL # BLD: 0.2 K/UL (ref 0–0.4)
EOSINOPHIL NFR BLD: 3 % (ref 0–5)
ERYTHROCYTE [DISTWIDTH] IN BLOOD BY AUTOMATED COUNT: 13.9 % (ref 11.6–14.5)
GLOBULIN SER CALC-MCNC: 3.8 G/DL (ref 2–4)
GLUCOSE SERPL-MCNC: 94 MG/DL (ref 74–99)
HCT VFR BLD AUTO: 40.7 % (ref 35–45)
HGB BLD-MCNC: 13.4 G/DL (ref 12–16)
LACTATE SERPL-SCNC: 2 MMOL/L (ref 0.4–2)
LYMPHOCYTES # BLD: 2.2 K/UL (ref 0.9–3.6)
LYMPHOCYTES NFR BLD: 25 % (ref 21–52)
MCH RBC QN AUTO: 29.5 PG (ref 24–34)
MCHC RBC AUTO-ENTMCNC: 32.9 G/DL (ref 31–37)
MCV RBC AUTO: 89.5 FL (ref 78–100)
MONOCYTES # BLD: 0.6 K/UL (ref 0.05–1.2)
MONOCYTES NFR BLD: 7 % (ref 3–10)
NEUTS SEG # BLD: 5.5 K/UL (ref 1.8–8)
NEUTS SEG NFR BLD: 64 % (ref 40–73)
PLATELET # BLD AUTO: 281 K/UL (ref 135–420)
PMV BLD AUTO: 10.2 FL (ref 9.2–11.8)
POTASSIUM SERPL-SCNC: 2.1 MMOL/L (ref 3.5–5.5)
PROT SERPL-MCNC: 6.7 G/DL (ref 6.4–8.2)
RBC # BLD AUTO: 4.55 M/UL (ref 4.2–5.3)
SODIUM SERPL-SCNC: 138 MMOL/L (ref 136–145)
SPECIMEN EXP DATE BLD: NORMAL
WBC # BLD AUTO: 8.6 K/UL (ref 4.6–13.2)

## 2021-08-28 PROCEDURE — 80053 COMPREHEN METABOLIC PANEL: CPT

## 2021-08-28 PROCEDURE — 74011250637 HC RX REV CODE- 250/637: Performed by: EMERGENCY MEDICINE

## 2021-08-28 PROCEDURE — 85025 COMPLETE CBC W/AUTO DIFF WBC: CPT

## 2021-08-28 PROCEDURE — 74174 CTA ABD&PLVS W/CONTRAST: CPT

## 2021-08-28 PROCEDURE — 83605 ASSAY OF LACTIC ACID: CPT

## 2021-08-28 PROCEDURE — 99285 EMERGENCY DEPT VISIT HI MDM: CPT

## 2021-08-28 PROCEDURE — 86901 BLOOD TYPING SEROLOGIC RH(D): CPT

## 2021-08-28 PROCEDURE — 74011000636 HC RX REV CODE- 636: Performed by: EMERGENCY MEDICINE

## 2021-08-28 RX ORDER — AMOXICILLIN 250 MG
1 CAPSULE ORAL 2 TIMES DAILY
Qty: 14 TABLET | Refills: 0 | Status: SHIPPED | OUTPATIENT
Start: 2021-08-28 | End: 2021-09-04

## 2021-08-28 RX ORDER — POTASSIUM CHLORIDE 20 MEQ/1
40 TABLET, EXTENDED RELEASE ORAL
Status: COMPLETED | OUTPATIENT
Start: 2021-08-28 | End: 2021-08-28

## 2021-08-28 RX ADMIN — IOPAMIDOL 100 ML: 755 INJECTION, SOLUTION INTRAVENOUS at 12:40

## 2021-08-28 RX ADMIN — POTASSIUM CHLORIDE 40 MEQ: 20 TABLET, EXTENDED RELEASE ORAL at 12:48

## 2021-08-28 NOTE — ED PROVIDER NOTES
EMERGENCY DEPARTMENT HISTORY AND PHYSICAL EXAM    Date: 8/28/2021  Patient Name: Augustin Cornelius    History of Presenting Illness     Chief Complaint   Patient presents with    Rectal Bleeding         History Provided By: Patient and EMS    Harsh Britt St is a 29-year-old female with past medical history of hypertension, elevated cholesterol, GERD, arthritis, vertigo presents for evaluation of abdominal pain, rectal bleeding. This morning patient woke up and had abdominal discomfort, she went to the restroom and began having bleeding from her rectum. She noticed bright red blood in the bowl. Patient states that she still feels like she has to go. She is not had any diarrhea. She is not on any anticoagulation. She continues to have right lower quadrant pain. On EMS arrival, patient was noted to have blood within the toilet. Patient reports that she has not had bloody stool previously. Her last colonoscopy was October of last year. Patient has a history of adenomatous polyps. Patient denies any known history of diverticulosis. PCP: aRh Mayer MD    Current Outpatient Medications   Medication Sig Dispense Refill    senna-docusate (Senna with Docusate Sodium) 8.6-50 mg per tablet Take 1 Tablet by mouth two (2) times a day for 7 days. 14 Tablet 0    sucralfate (CARAFATE) 1 gram tablet Take 1 Tablet by mouth four (4) times daily. 120 Tablet 2    metoprolol tartrate (LOPRESSOR) 50 mg tablet Take 1 Tablet by mouth two (2) times a day. 30 Tablet 2    amLODIPine (NORVASC) 5 mg tablet Take 1 Tablet by mouth daily. 30 Tablet 2    pantoprazole (Protonix) 40 mg tablet Take 1 Tablet by mouth daily. 30 Tablet 5    aspirin delayed-release 81 mg tablet Take 1 Tablet by mouth daily. 30 Tablet 5    ergocalciferol (VITAMIN D2) 50,000 unit capsule Take 50,000 Units by mouth every seven (7) days.  Dr. Monreal Comment with [facility]  Indications: Vitamin D Deficiency      hydroCHLOROthiazide (HYDRODIURIL) 25 mg tablet Take 1 Tab by mouth daily. 30 Tab 0    spironolactone (ALDACTONE) 25 mg tablet Take 1 Tab by mouth daily. 30 Tab 0    lovastatin (MEVACOR) 40 mg tablet Take 40 mg by mouth nightly. Past History     Past Medical History:  Past Medical History:   Diagnosis Date    Arthritis     Blood clot in vein 2004    left leg     [facility]     Cancer ([facility]     right breast    GERD (gastroesophageal reflux disease)     High cholesterol     Hypertension     Intractable vomiting 7/1/2021    Other ill-defined conditions(799.89)     high cholestrol    Vertigo        Past Surgical History:  Past Surgical History:   Procedure Laterality Date    HX BREAST LUMPECTOMY      HX GI      colonosocpy    HX KNEE REPLACEMENT      HX ORTHOPAEDIC      total knee replacement bilateral    HX ORTHOPAEDIC      excision of cyst left hand    HX TUBAL LIGATION      HX TUMOR REMOVAL      HI ABDOMEN SURGERY PROC UNLISTED      removal of tumor abdomen       Family History:  No family history on file. Social History:  Social History     Tobacco Use    Smoking status: Passive Smoke Exposure - Never Smoker    Smokeless tobacco: Never Used   Substance Use Topics    Alcohol use: No    Drug use: No       Allergies: Allergies   Allergen Reactions    Amiloride Nausea and Vomiting    Amoxicillin Rash    Bactrim [Sulfamethoprim Ds] Unknown (comments)    Gabapentin Rash    Ibuprofen Unknown (comments)    Zestoretic [Lisinopril-Hydrochlorothiazide] Unknown (comments)         Review of Systems   Review of Systems   Constitutional: Negative for activity change and fever. HENT: Negative for congestion and sore throat. Eyes: Negative for discharge. Respiratory: Negative for apnea. Cardiovascular: Negative for chest pain. Gastrointestinal: Positive for abdominal pain and blood in stool. Negative for abdominal distention. Genitourinary: Negative for dysuria and flank pain. Musculoskeletal: Negative for arthralgias. Skin: Negative for rash. Neurological: Negative for dizziness and weakness. Hematological: Negative for adenopathy. Psychiatric/Behavioral: Negative for agitation. All other systems reviewed and are negative.         Physical Exam     Vitals:    08/28/21 1117 08/28/21 1145 08/28/21 1245 08/28/21 1330   BP: 122/74 108/72 (!) 107/55 125/65   Pulse: (!) 58 (!) 50 (!) 53 62   Resp: 17 18 16 16   Temp: 98.4 °F (36.9 °C)      SpO2: 100% 100% 100% 99%   Weight: 61.2 kg (135 lb)      Height: 4' 11\" (1.499 m)        Physical Exam    Nursing notes and vital signs reviewed    Constitutional: Non toxic appearing, moderate distress  Head: Normocephalic, Atraumatic  Eyes: EOMI  Neck: Supple  Cardiovascular: Regular rate and rhythm, no murmurs, rubs, or gallops  Chest: Normal work of breathing and chest excursion bilaterally  Lungs: Clear to ausculation bilaterally  Abdomen: Soft, right lower quadrant tenderness with no rebound or guarding non distended, normoactive bowel sounds, rectal examination with bright red blood, no hemorrhoids visualized externally, no palpable hemorrhoids internally  Back: No evidence of trauma or deformity  Extremities: No evidence of trauma or deformity, no LE edema  Skin: Warm and dry, normal cap refill  Neuro: Alert and appropriate, CN intact, normal speech, strength and sensation full and symmetric bilaterally, normal gait, normal coordination  Psychiatric: Normal mood and affect      Diagnostic Study Results     Labs -     Recent Results (from the past 12 hour(s))   CBC WITH AUTOMATED DIFF    Collection Time: 08/28/21 11:31 AM   Result Value Ref Range    WBC 8.6 4.6 - 13.2 K/uL    RBC 4.55 4.20 - 5.30 M/uL    HGB 13.4 12.0 - 16.0 g/dL    HCT 40.7 35.0 - 45.0 %    MCV 89.5 78.0 - 100.0 FL    MCH 29.5 24.0 - 34.0 PG    MCHC 32.9 31.0 - 37.0 g/dL    RDW 13.9 11.6 - 14.5 %    PLATELET 369 624 - 505 K/uL    MPV 10.2 9.2 - 11.8 FL    NEUTROPHILS 64 40 - 73 %    LYMPHOCYTES 25 21 - 52 %    MONOCYTES 7 3 - 10 %    EOSINOPHILS 3 0 - 5 %    BASOPHILS 0 0 - 2 %    ABS. NEUTROPHILS 5.5 1.8 - 8.0 K/UL    ABS. LYMPHOCYTES 2.2 0.9 - 3.6 K/UL    ABS. MONOCYTES 0.6 0.05 - 1.2 K/UL    ABS. EOSINOPHILS 0.2 0.0 - 0.4 K/UL    ABS. BASOPHILS 0.0 0.0 - 0.1 K/UL    DF AUTOMATED     METABOLIC PANEL, COMPREHENSIVE    Collection Time: 08/28/21 11:31 AM   Result Value Ref Range    Sodium 138 136 - 145 mmol/L    Potassium 2.1 (LL) 3.5 - 5.5 mmol/L    Chloride 99 (L) 100 - 111 mmol/L    CO2 28 21 - 32 mmol/L    Anion gap 11 3.0 - 18 mmol/L    Glucose 94 74 - 99 mg/dL    BUN 15 7.0 - 18 MG/DL    Creatinine 1.28 0.6 - 1.3 MG/DL    BUN/Creatinine ratio 12 12 - 20      GFR est AA 49 (L) >60 ml/min/1.73m2    GFR est non-AA 41 (L) >60 ml/min/1.73m2    Calcium 9.5 8.5 - 10.1 MG/DL    Bilirubin, total 0.6 0.2 - 1.0 MG/DL    ALT (SGPT) 17 13 - 56 U/L    AST (SGOT) 28 10 - 38 U/L    Alk. phosphatase 81 45 - 117 U/L    Protein, total 6.7 6.4 - 8.2 g/dL    Albumin 2.9 (L) 3.4 - 5.0 g/dL    Globulin 3.8 2.0 - 4.0 g/dL    A-G Ratio 0.8 0.8 - 1.7     TYPE & SCREEN    Collection Time: 08/28/21 11:31 AM   Result Value Ref Range    Crossmatch Expiration 08/31/2021,2359     ABO/Rh(D) A POSITIVE     Antibody screen NEG    LACTIC ACID    Collection Time: 08/28/21 11:31 AM   Result Value Ref Range    Lactic acid 2.0 0.4 - 2.0 MMOL/L       Radiologic Studies -   CTA ABDOMEN PELV W CONT   Final Result      1. No evidence to suggest active GI bleeding, acute aortic pathology, or other   acute inflammatory process in the abdomen or pelvis. 2. Large hiatal hernia without significant change. 3. Multiple hepatic and renal cysts are stable. 4. Advanced multilevel thoracolumbar spondylosis. CT Results  (Last 48 hours)               08/28/21 1305  CTA ABDOMEN PELV W CONT Final result    Impression:      1.   No evidence to suggest active GI bleeding, acute aortic pathology, or other   acute inflammatory process in the abdomen or pelvis. 2. Large hiatal hernia without significant change. 3. Multiple hepatic and renal cysts are stable. 4. Advanced multilevel thoracolumbar spondylosis. Narrative:  EXAM:  CT ARTERIOGRAM ABDOMEN AND PELVIS       CLINICAL INDICATION/HISTORY: Lower GI bleed     > Additional: None. COMPARISON: Correlation with prior CT dated June 1, 2021     > Reference Exam: None. TECHNIQUE:   Pre-contrast imaging was performed. Subsequent CT angiogram of the   abdomen, and pelvis. Thin sagittal and coronal MPR and MIP reconstructions were   performed. Extensive separate workstation post processing was performed by [facility]   [facility] to include MIPs, color surface images, 3-D reconstructions, and   curve planar reformat imaging. One or more dose reduction techniques were used on this CT: automated exposure   control, adjustment of the mAs and/or kVp according to patient size, and   iterative reconstruction techniques. The specific techniques used on this CT   exam have been documented in the patient's electronic medical record. Digital   Imaging and Communications in Medicine (DICOM) format image data are available   to nonaffiliated external healthcare facilities or entities on a secure, media   free, reciprocally searchable basis with patient authorization for at least a   12-month period after this study. _______________           FINDINGS:           --- CT ANGIOGRAM ---       The noncontrast imaging through the abdomen and pelvis demonstrates scattered   atheromatous calcifications of the tortuous aorta. No abnormal attenuation along   the wall of the aorta. There is some high density contrast material noted dependently in the stomach   which is unchanged on the postcontrast images. Postcontrast arterial and delayed   phase imaging demonstrates no extravasation of contrast into the small bowel or   colon either.        There is no evidence of aortic dissection or other acute aortic pathology. --- CT ABDOMEN/PELVIS ---           LOWER CHEST: Dependent atelectasis. LIVER, BILIARY: Scattered hepatic hypodensities in the majority of which are too   small to characterize but unchanged and likely represent small cysts. No biliary   dilation. Gallbladder is unremarkable. PANCREAS: Normal.       SPLEEN: Normal.       ADRENALS: Normal.       KIDNEYS: Numerous cysts and subcentimeter low-density lesions throughout the   kidneys are unchanged. No hydronephrosis or suspicious mass. LYMPH NODES: No enlarged lymph nodes. GASTROINTESTINAL TRACT: Large hiatal hernia. No bowel dilation or wall   thickening. Mild scattered diverticulosis. PELVIC ORGANS: Calcified uterine fibroids noted. BONES: Chronic fracture deformity and advanced degenerative changes of the left   hip are noted. Advanced multilevel spondylosis including unchanged grade 2   anterolisthesis of L4-L5 with obliteration of the disc space but unchanged   appearance and morphology compared to the prior study. Additional areas of   severe degenerative disc disease and degenerative fusion including at L1-L2. Advanced multilevel facet arthropathy. OTHER: No ascites. _______________                   CXR Results  (Last 48 hours)    None          Medications given in the ED-  Medications   potassium chloride (K-DUR, KLOR-CON) SR tablet 40 mEq (40 mEq Oral Given 8/28/21 1248)   iopamidoL (ISOVUE-370) 76 % injection 100 mL (100 mL IntraVENous Given 8/28/21 1240)         Medical Decision Making   I am the first provider for this patient. I reviewed the vital signs, available nursing notes, past medical history, past surgical history, family history and social history. Vital Signs-Reviewed the patient's vital signs.     Pulse Oximetry Analysis - 100% on room air, not hypoxic     Cardiac Monitor:  Rate: 62 bpm  Rhythm: NSR      Records Reviewed: Old Medical Records    Provider Notes (Medical Decision Making): Isa Beal is a 78 y.o. female who presents for evaluation of rectal bleeding. On arrival patient is afebrile, nontoxic-appearing and hemodynamically stable. CBC, CMP, lactate, CT angio of the abdomen pelvis obtained. Patient does have small amounts of bright red blood around her rectum on arrival.  CT angiography negative for evidence of ischemic bowel, acute diverticular bleed requiring embolization. Patient's hemoglobin is stable. She remains hemodynamically stable. Patient observed for period in the emergency department without significant ongoing bleeding. GI consulted who recommends discharge home with 24 hours clear liquid diet, aggressive stool softeners. Discussed with patient that she may have a small amount of recurrent bleeding, however she should return immediately for large volume bleeding, lightheadedness, dizziness. Patient's potassium also noted to be 2.1, patient largely asymptomatic, she is given oral potassium without difficulty. Patient discharged home in stable condition. Procedures:  Procedures      Diagnosis and Disposition     CONSULT NOTE:   2:05 PM  I spoke with Dr. Davis Comfort: GI  Discussed pt's hx, disposition, and available diagnostic and imaging results over the telephone. Reviewed care plans. Consulting physician agrees with plans as outlined. Dr. Wendie Marshall body recommends discharge home with close follow-up as needed. Recommends 24 hours of clear liquid diet, aggressive stool softeners. Written by Arnel Rosen MD.      DISCHARGE NOTE:    Aileen Bude  results have been reviewed with her. She has been counseled regarding her diagnosis, treatment, and plan. She verbally conveys understanding and agreement of the signs, symptoms, diagnosis, treatment and prognosis and additionally agrees to follow up as discussed.   She also agrees with the care-plan and conveys that all of her questions have been answered. I have also provided discharge instructions for her that include: educational information regarding their diagnosis and treatment, and list of reasons why they would want to return to the ED prior to their follow-up appointment, should her condition change. She has been provided with education for proper emergency department utilization. CLINICAL IMPRESSION:    1. Lower GI bleed        PLAN:  1. D/C Home  2. Discharge Medication List as of 8/28/2021  2:11 PM      START taking these medications    Details   senna-docusate (Senna with Docusate Sodium) 8.6-50 mg per tablet Take 1 Tablet by mouth two (2) times a day for 7 days. , Normal, Disp-14 Tablet, R-0         CONTINUE these medications which have NOT CHANGED    Details   sucralfate (CARAFATE) 1 gram tablet Take 1 Tablet by mouth four (4) times daily. , Normal, Disp-120 Tablet, R-2      metoprolol tartrate (LOPRESSOR) 50 mg tablet Take 1 Tablet by mouth two (2) times a day., Normal, Disp-30 Tablet, R-2      amLODIPine (NORVASC) 5 mg tablet Take 1 Tablet by mouth daily. , Normal, Disp-30 Tablet, R-2      pantoprazole (Protonix) 40 mg tablet Take 1 Tablet by mouth daily. , Normal, Disp-30 Tablet, R-5      aspirin delayed-release 81 mg tablet Take 1 Tablet by mouth daily. , Normal, Disp-30 Tablet, R-5      ergocalciferol (VITAMIN D2) 50,000 unit capsule Take 50,000 Units by mouth every seven (7) days. Dr. Prabhakar Jernigan with [facility]  Indications: Vitamin D Deficiency, Historical Med      hydroCHLOROthiazide (HYDRODIURIL) 25 mg tablet Take 1 Tab by mouth daily. , Normal, Disp-30 Tab, R-0      spironolactone (ALDACTONE) 25 mg tablet Take 1 Tab by mouth daily. , Normal, Disp-30 Tab, R-0      lovastatin (MEVACOR) 40 mg tablet Take 40 mg by mouth nightly., Historical Med           3.    Follow-up Information     Follow up With Specialties Details Why Contact Info    Celina Broussard MD Gastroenterology Schedule an appointment as soon as possible for a visit  As needed [address] Norman Peña [id number] [place] [address]      [facility] EMERGENCY DEPT Emergency Medicine  If symptoms worsen [address] [id number]  [phone]        _______________________________      Please note that this dictation was completed with [employer], the computer voice recognition software. Quite often unanticipated grammatical, syntax, homophones, and other interpretive errors are inadvertently transcribed by the computer software. Please disregard these errors. Please excuse any errors that have escaped final proofreading.

## 2021-08-28 NOTE — DISCHARGE INSTRUCTIONS
Please follow a clear liquid diet for the next 24 hours, after this you may add soft foods. Do not return to a regular diet until bleeding has stopped. Please take a stool softener 2-3 times daily. Return immediately for worsening bleeding, lightheadedness, worsening abdominal pain or any new or concerning symptoms.

## 2021-08-28 NOTE — ED TRIAGE NOTES
Patient arrives via ambulance from home with complaints of rectal bleeding that began this morning. Had BM this morning with bright red blood.  Hx of constipation, asa 81mg daily

## 2021-08-29 ENCOUNTER — HOSPITAL ENCOUNTER (EMERGENCY)
Age: 76
Discharge: HOME | End: 2021-08-29
Attending: EMERGENCY MEDICINE
Payer: MEDICARE

## 2021-08-29 VITALS
HEART RATE: 56 BPM | RESPIRATION RATE: 18 BRPM | OXYGEN SATURATION: 100 % | TEMPERATURE: 98.7 F | BODY MASS INDEX: 27.21 KG/M2 | HEIGHT: 59 IN | DIASTOLIC BLOOD PRESSURE: 58 MMHG | WEIGHT: 135 LBS | SYSTOLIC BLOOD PRESSURE: 128 MMHG

## 2021-08-29 DIAGNOSIS — E83.42 HYPOMAGNESEMIA: Primary | ICD-10-CM

## 2021-08-29 DIAGNOSIS — E87.6 HYPOKALEMIA: ICD-10-CM

## 2021-08-29 DIAGNOSIS — K62.5 RECTAL BLEEDING: ICD-10-CM

## 2021-08-29 DIAGNOSIS — N30.00 ACUTE CYSTITIS WITHOUT HEMATURIA: ICD-10-CM

## 2021-08-29 LAB
ABO + RH BLD: NORMAL
ALBUMIN SERPL-MCNC: 2.9 G/DL (ref 3.4–5)
ALBUMIN/GLOB SERPL: 0.7 {RATIO} (ref 0.8–1.7)
ALP SERPL-CCNC: 88 U/L (ref 45–117)
ALT SERPL-CCNC: 19 U/L (ref 13–56)
ANION GAP SERPL CALC-SCNC: 12 MMOL/L (ref 3–18)
ANION GAP SERPL CALC-SCNC: 9 MMOL/L (ref 3–18)
APPEARANCE UR: CLEAR
APTT PPP: 20.7 SEC (ref 23–36.4)
AST SERPL-CCNC: 29 U/L (ref 10–38)
BACTERIA URNS QL MICRO: ABNORMAL /HPF
BASOPHILS # BLD: 0 K/UL (ref 0–0.1)
BASOPHILS NFR BLD: 0 % (ref 0–2)
BILIRUB SERPL-MCNC: 0.8 MG/DL (ref 0.2–1)
BILIRUB UR QL: NEGATIVE
BLOOD GROUP ANTIBODIES SERPL: NORMAL
BUN SERPL-MCNC: 12 MG/DL (ref 7–18)
BUN SERPL-MCNC: 12 MG/DL (ref 7–18)
BUN/CREAT SERPL: 10 (ref 12–20)
BUN/CREAT SERPL: 9 (ref 12–20)
CALCIUM SERPL-MCNC: 9.3 MG/DL (ref 8.5–10.1)
CALCIUM SERPL-MCNC: 9.7 MG/DL (ref 8.5–10.1)
CHLORIDE SERPL-SCNC: 102 MMOL/L (ref 100–111)
CHLORIDE SERPL-SCNC: 103 MMOL/L (ref 100–111)
CO2 SERPL-SCNC: 26 MMOL/L (ref 21–32)
CO2 SERPL-SCNC: 28 MMOL/L (ref 21–32)
COLOR UR: YELLOW
CREAT SERPL-MCNC: 1.2 MG/DL (ref 0.6–1.3)
CREAT SERPL-MCNC: 1.41 MG/DL (ref 0.6–1.3)
DIFFERENTIAL METHOD BLD: NORMAL
EOSINOPHIL # BLD: 0.2 K/UL (ref 0–0.4)
EOSINOPHIL NFR BLD: 2 % (ref 0–5)
EPITH CASTS URNS QL MICRO: ABNORMAL /LPF (ref 0–5)
ERYTHROCYTE [DISTWIDTH] IN BLOOD BY AUTOMATED COUNT: 14 % (ref 11.6–14.5)
GLOBULIN SER CALC-MCNC: 4 G/DL (ref 2–4)
GLUCOSE SERPL-MCNC: 81 MG/DL (ref 74–99)
GLUCOSE SERPL-MCNC: 89 MG/DL (ref 74–99)
GLUCOSE UR STRIP.AUTO-MCNC: NEGATIVE MG/DL
HCT VFR BLD AUTO: 40.4 % (ref 35–45)
HGB BLD-MCNC: 13.7 G/DL (ref 12–16)
HGB UR QL STRIP: ABNORMAL
INR PPP: 1.1 (ref 0.8–1.2)
KETONES UR QL STRIP.AUTO: NEGATIVE MG/DL
LEUKOCYTE ESTERASE UR QL STRIP.AUTO: ABNORMAL
LIPASE SERPL-CCNC: 69 U/L (ref 73–393)
LYMPHOCYTES # BLD: 2.6 K/UL (ref 0.9–3.6)
LYMPHOCYTES NFR BLD: 33 % (ref 21–52)
MAGNESIUM SERPL-MCNC: 1.5 MG/DL (ref 1.6–2.6)
MCH RBC QN AUTO: 29.2 PG (ref 24–34)
MCHC RBC AUTO-ENTMCNC: 33.9 G/DL (ref 31–37)
MCV RBC AUTO: 86.1 FL (ref 78–100)
MONOCYTES # BLD: 0.6 K/UL (ref 0.05–1.2)
MONOCYTES NFR BLD: 7 % (ref 3–10)
NEUTS SEG # BLD: 4.7 K/UL (ref 1.8–8)
NEUTS SEG NFR BLD: 58 % (ref 40–73)
NITRITE UR QL STRIP.AUTO: NEGATIVE
PH UR STRIP: 8 [PH] (ref 5–8)
PLATELET # BLD AUTO: 300 K/UL (ref 135–420)
PMV BLD AUTO: 10.3 FL (ref 9.2–11.8)
POTASSIUM SERPL-SCNC: 2.2 MMOL/L (ref 3.5–5.5)
POTASSIUM SERPL-SCNC: 2.7 MMOL/L (ref 3.5–5.5)
PROT SERPL-MCNC: 6.9 G/DL (ref 6.4–8.2)
PROT UR STRIP-MCNC: NEGATIVE MG/DL
PROTHROMBIN TIME: 13.5 SEC (ref 11.5–15.2)
RBC # BLD AUTO: 4.69 M/UL (ref 4.2–5.3)
RBC #/AREA URNS HPF: ABNORMAL /HPF (ref 0–5)
SODIUM SERPL-SCNC: 139 MMOL/L (ref 136–145)
SODIUM SERPL-SCNC: 141 MMOL/L (ref 136–145)
SP GR UR REFRACTOMETRY: 1.01 (ref 1–1.03)
SPECIMEN EXP DATE BLD: NORMAL
UROBILINOGEN UR QL STRIP.AUTO: 0.2 EU/DL (ref 0.2–1)
WBC # BLD AUTO: 8.1 K/UL (ref 4.6–13.2)
WBC URNS QL MICRO: ABNORMAL /HPF (ref 0–5)

## 2021-08-29 PROCEDURE — 81001 URINALYSIS AUTO W/SCOPE: CPT

## 2021-08-29 PROCEDURE — 99285 EMERGENCY DEPT VISIT HI MDM: CPT

## 2021-08-29 PROCEDURE — 74011250636 HC RX REV CODE- 250/636: Performed by: EMERGENCY MEDICINE

## 2021-08-29 PROCEDURE — 80053 COMPREHEN METABOLIC PANEL: CPT

## 2021-08-29 PROCEDURE — 83690 ASSAY OF LIPASE: CPT

## 2021-08-29 PROCEDURE — 96375 TX/PRO/DX INJ NEW DRUG ADDON: CPT

## 2021-08-29 PROCEDURE — C9113 INJ PANTOPRAZOLE SODIUM, VIA: HCPCS | Performed by: EMERGENCY MEDICINE

## 2021-08-29 PROCEDURE — 85610 PROTHROMBIN TIME: CPT

## 2021-08-29 PROCEDURE — 74011250637 HC RX REV CODE- 250/637: Performed by: EMERGENCY MEDICINE

## 2021-08-29 PROCEDURE — 83735 ASSAY OF MAGNESIUM: CPT

## 2021-08-29 PROCEDURE — 86901 BLOOD TYPING SEROLOGIC RH(D): CPT

## 2021-08-29 PROCEDURE — 85025 COMPLETE CBC W/AUTO DIFF WBC: CPT

## 2021-08-29 PROCEDURE — 96365 THER/PROPH/DIAG IV INF INIT: CPT

## 2021-08-29 PROCEDURE — 85730 THROMBOPLASTIN TIME PARTIAL: CPT

## 2021-08-29 RX ORDER — PANTOPRAZOLE SODIUM 40 MG/10ML
40 INJECTION, POWDER, LYOPHILIZED, FOR SOLUTION INTRAVENOUS
Status: COMPLETED | OUTPATIENT
Start: 2021-08-29 | End: 2021-08-29

## 2021-08-29 RX ORDER — POTASSIUM CHLORIDE 20 MEQ/1
20 TABLET, EXTENDED RELEASE ORAL 2 TIMES DAILY
Qty: 14 TABLET | Refills: 0 | Status: SHIPPED | OUTPATIENT
Start: 2021-08-29 | End: 2021-09-05

## 2021-08-29 RX ORDER — LANOLIN ALCOHOL/MO/W.PET/CERES
400 CREAM (GRAM) TOPICAL
Status: COMPLETED | OUTPATIENT
Start: 2021-08-29 | End: 2021-08-29

## 2021-08-29 RX ORDER — DOCUSATE SODIUM 100 MG/1
100 CAPSULE, LIQUID FILLED ORAL 2 TIMES DAILY
Qty: 60 CAPSULE | Refills: 2 | Status: SHIPPED | OUTPATIENT
Start: 2021-08-29 | End: 2021-11-27

## 2021-08-29 RX ORDER — POTASSIUM CHLORIDE 20 MEQ/1
40 TABLET, EXTENDED RELEASE ORAL
Status: COMPLETED | OUTPATIENT
Start: 2021-08-29 | End: 2021-08-29

## 2021-08-29 RX ORDER — CEPHALEXIN 500 MG/1
500 CAPSULE ORAL 2 TIMES DAILY
Qty: 14 CAPSULE | Refills: 0 | Status: SHIPPED | OUTPATIENT
Start: 2021-08-29 | End: 2021-09-05

## 2021-08-29 RX ORDER — POLYETHYLENE GLYCOL 3350 17 G/17G
17 POWDER, FOR SOLUTION ORAL DAILY
Qty: 116 G | Refills: 0 | Status: SHIPPED | OUTPATIENT
Start: 2021-08-29

## 2021-08-29 RX ORDER — POTASSIUM CHLORIDE 7.45 MG/ML
10 INJECTION INTRAVENOUS
Status: COMPLETED | OUTPATIENT
Start: 2021-08-29 | End: 2021-08-29

## 2021-08-29 RX ADMIN — PANTOPRAZOLE SODIUM 40 MG: 40 INJECTION, POWDER, FOR SOLUTION INTRAVENOUS at 05:54

## 2021-08-29 RX ADMIN — POTASSIUM CHLORIDE 10 MEQ: 10 INJECTION, SOLUTION INTRAVENOUS at 06:23

## 2021-08-29 RX ADMIN — SODIUM CHLORIDE 500 ML: 900 INJECTION, SOLUTION INTRAVENOUS at 06:24

## 2021-08-29 RX ADMIN — POTASSIUM CHLORIDE 40 MEQ: 20 TABLET, EXTENDED RELEASE ORAL at 06:35

## 2021-08-29 RX ADMIN — Medication 400 MG: at 06:22

## 2021-08-29 NOTE — ED PROVIDER NOTES
EMERGENCY DEPARTMENT HISTORY AND PHYSICAL EXAM    Date: 8/29/2021  Patient Name: Simi Park    History of Presenting Illness     Chief Complaint   Patient presents with    Abdominal Pain         History Provided By: Patient and Patient's Daughter    5:10 AM  Simi Park is a 78 y.o. female with PMHX of hypertension, high cholesterol who presents to the emergency department C/O blood per rectum and abdominal pain. Per patient the symptoms started yesterday and she was seen in this emergency department for those symptoms. She reports since she went home the symptoms have continued unchanged. No clear relieving or exacerbating factors. She reports the pain is diffuse in her abdomen. Denies any nausea, vomiting, fever, chest pain, shortness of breath, vaginal bleeding, urinary complaints. Does not take any blood thinners. PCP: Fadumo Solis MD    Current Outpatient Medications   Medication Sig Dispense Refill    potassium chloride (K-DUR, KLOR-CON) 20 mEq tablet Take 1 Tablet by mouth two (2) times a day for 7 days. 14 Tablet 0    cephALEXin (Keflex) 500 mg capsule Take 1 Capsule by mouth two (2) times a day for 7 days. 14 Capsule 0    polyethylene glycol (Miralax) 17 gram/dose powder Take 17 g by mouth daily. 1 tablespoon with 8 oz of water daily 116 g 0    docusate sodium (COLACE) 100 mg capsule Take 1 Capsule by mouth two (2) times a day for 90 days. 60 Capsule 2    senna-docusate (Senna with Docusate Sodium) 8.6-50 mg per tablet Take 1 Tablet by mouth two (2) times a day for 7 days. 14 Tablet 0    sucralfate (CARAFATE) 1 gram tablet Take 1 Tablet by mouth four (4) times daily. 120 Tablet 2    metoprolol tartrate (LOPRESSOR) 50 mg tablet Take 1 Tablet by mouth two (2) times a day. 30 Tablet 2    amLODIPine (NORVASC) 5 mg tablet Take 1 Tablet by mouth daily. 30 Tablet 2    pantoprazole (Protonix) 40 mg tablet Take 1 Tablet by mouth daily.  30 Tablet 5    aspirin delayed-release 81 mg tablet Take 1 Tablet by mouth daily. 30 Tablet 5    ergocalciferol (VITAMIN D2) 50,000 unit capsule Take 50,000 Units by mouth every seven (7) days. Dr. Merlin Bail[facility]  Indications: Vitamin D Deficiency      hydroCHLOROthiazide (HYDRODIURIL) 25 mg tablet Take 1 Tab by mouth daily. 30 Tab 0    spironolactone (ALDACTONE) 25 mg tablet Take 1 Tab by mouth daily. 30 Tab 0    lovastatin (MEVACOR) 40 mg tablet Take 40 mg by mouth nightly. Past History     Past Medical History:  Past Medical History:   Diagnosis Date    Arthritis     Blood clot in vein 2004    left leg     [facility] [address]     Cancer ([facility] [address]     right breast    GERD (gastroesophageal reflux disease)     High cholesterol     Hypertension     Intractable vomiting 7/1/2021    Other ill-defined conditions(799.89)     high cholestrol    Vertigo        Past Surgical History:  Past Surgical History:   Procedure Laterality Date    HX BREAST LUMPECTOMY      HX GI      colonosocpy    HX KNEE REPLACEMENT      HX ORTHOPAEDIC      total knee replacement bilateral    HX ORTHOPAEDIC      excision of cyst left hand    HX TUBAL LIGATION      HX TUMOR REMOVAL      FL ABDOMEN SURGERY PROC UNLISTED      removal of tumor abdomen       Family History:  History reviewed. No pertinent family history. Social History:  Social History     Tobacco Use    Smoking status: Passive Smoke Exposure - Never Smoker    Smokeless tobacco: Never Used   Substance Use Topics    Alcohol use: No    Drug use: No       Allergies: Allergies   Allergen Reactions    Amiloride Nausea and Vomiting    Amoxicillin Rash    Bactrim [Sulfamethoprim Ds] Unknown (comments)    Gabapentin Rash    Ibuprofen Unknown (comments)    Zestoretic [Lisinopril-Hydrochlorothiazide] Unknown (comments)         Review of Systems   Review of Systems   Constitutional: Negative for fever. Respiratory: Negative for shortness of breath.     Cardiovascular: Negative for chest pain.   Gastrointestinal: Positive for abdominal pain, blood in stool and rectal pain. Genitourinary: Negative for difficulty urinating, vaginal bleeding and vaginal discharge. All other systems reviewed and are negative. Physical Exam     Vitals:    08/29/21 0800 08/29/21 0830 08/29/21 0900 08/29/21 0930   BP: 120/69 (!) 104/50 115/64 125/65   Pulse: 60 (!) 56 (!) 58 (!) 59   Resp: 12 16 14 15   Temp:       SpO2: 96% 99% 97% 96%   Weight:       Height:         Physical Exam    Nursing notes and vital signs reviewed    Constitutional: Non toxic appearing, moderate distress  Head: Normocephalic, Atraumatic  Eyes: EOMI  Neck: Supple  Cardiovascular: Regular rate and rhythm, no murmurs, rubs, or gallops  Chest: Normal work of breathing and chest excursion bilaterally  Lungs: Clear to ausculation bilaterally  Abdomen: Soft, mild diffuse tenderness without rebound or guarding, non distended, normoactive bowel sounds  Back: No evidence of trauma or deformity  Extremities: No evidence of trauma or deformity  Skin: Warm and dry, normal cap refill  Neuro: Alert and appropriate  Psychiatric: Normal mood and affect      Diagnostic Study Results     Labs -     Recent Results (from the past 12 hour(s))   CBC WITH AUTOMATED DIFF    Collection Time: 08/29/21  5:40 AM   Result Value Ref Range    WBC 8.1 4.6 - 13.2 K/uL    RBC 4.69 4.20 - 5.30 M/uL    HGB 13.7 12.0 - 16.0 g/dL    HCT 40.4 35.0 - 45.0 %    MCV 86.1 78.0 - 100.0 FL    MCH 29.2 24.0 - 34.0 PG    MCHC 33.9 31.0 - 37.0 g/dL    RDW 14.0 11.6 - 14.5 %    PLATELET 996 266 - 988 K/uL    MPV 10.3 9.2 - 11.8 FL    NEUTROPHILS 58 40 - 73 %    LYMPHOCYTES 33 21 - 52 %    MONOCYTES 7 3 - 10 %    EOSINOPHILS 2 0 - 5 %    BASOPHILS 0 0 - 2 %    ABS. NEUTROPHILS 4.7 1.8 - 8.0 K/UL    ABS. LYMPHOCYTES 2.6 0.9 - 3.6 K/UL    ABS. MONOCYTES 0.6 0.05 - 1.2 K/UL    ABS. EOSINOPHILS 0.2 0.0 - 0.4 K/UL    ABS.  BASOPHILS 0.0 0.0 - 0.1 K/UL    DF AUTOMATED     TYPE & SCREEN Collection Time: 08/29/21  5:40 AM   Result Value Ref Range    Crossmatch Expiration 09/01/2021,2359     ABO/Rh(D) A POSITIVE     Antibody screen NEG    PROTHROMBIN TIME + INR    Collection Time: 08/29/21  5:40 AM   Result Value Ref Range    Prothrombin time 13.5 11.5 - 15.2 sec    INR 1.1 0.8 - 1.2     PTT    Collection Time: 08/29/21  5:40 AM   Result Value Ref Range    aPTT 20.7 (L) 23.0 - 00.2 SEC   METABOLIC PANEL, COMPREHENSIVE    Collection Time: 08/29/21  5:40 AM   Result Value Ref Range    Sodium 139 136 - 145 mmol/L    Potassium 2.2 (LL) 3.5 - 5.5 mmol/L    Chloride 102 100 - 111 mmol/L    CO2 28 21 - 32 mmol/L    Anion gap 9 3.0 - 18 mmol/L    Glucose 89 74 - 99 mg/dL    BUN 12 7.0 - 18 MG/DL    Creatinine 1.41 (H) 0.6 - 1.3 MG/DL    BUN/Creatinine ratio 9 (L) 12 - 20      GFR est AA 44 (L) >60 ml/min/1.73m2    GFR est non-AA 36 (L) >60 ml/min/1.73m2    Calcium 9.7 8.5 - 10.1 MG/DL    Bilirubin, total 0.8 0.2 - 1.0 MG/DL    ALT (SGPT) 19 13 - 56 U/L    AST (SGOT) 29 10 - 38 U/L    Alk.  phosphatase 88 45 - 117 U/L    Protein, total 6.9 6.4 - 8.2 g/dL    Albumin 2.9 (L) 3.4 - 5.0 g/dL    Globulin 4.0 2.0 - 4.0 g/dL    A-G Ratio 0.7 (L) 0.8 - 1.7     LIPASE    Collection Time: 08/29/21  5:40 AM   Result Value Ref Range    Lipase 69 (L) 73 - 393 U/L   MAGNESIUM    Collection Time: 08/29/21  5:40 AM   Result Value Ref Range    Magnesium 1.5 (L) 1.6 - 2.6 mg/dL   URINALYSIS W/ RFLX MICROSCOPIC    Collection Time: 08/29/21  8:05 AM   Result Value Ref Range    Color YELLOW      Appearance CLEAR      Specific gravity 1.011 1.005 - 1.030      pH (UA) 8.0 5.0 - 8.0      Protein Negative NEG mg/dL    Glucose Negative NEG mg/dL    Ketone Negative NEG mg/dL    Bilirubin Negative NEG      Blood MODERATE (A) NEG      Urobilinogen 0.2 0.2 - 1.0 EU/dL    Nitrites Negative NEG      Leukocyte Esterase SMALL (A) NEG     URINE MICROSCOPIC ONLY    Collection Time: 08/29/21  8:05 AM   Result Value Ref Range    WBC 0 to 3 0 - 5 /hpf    RBC 0 to 3 0 - 5 /hpf    Epithelial cells 2+ 0 - 5 /lpf    Bacteria FEW (A) NEG /hpf   METABOLIC PANEL, BASIC    Collection Time: 08/29/21  8:45 AM   Result Value Ref Range    Sodium 141 136 - 145 mmol/L    Potassium 2.7 (LL) 3.5 - 5.5 mmol/L    Chloride 103 100 - 111 mmol/L    CO2 26 21 - 32 mmol/L    Anion gap 12 3.0 - 18 mmol/L    Glucose 81 74 - 99 mg/dL    BUN 12 7.0 - 18 MG/DL    Creatinine 1.20 0.6 - 1.3 MG/DL    BUN/Creatinine ratio 10 (L) 12 - 20      GFR est AA 53 (L) >60 ml/min/1.73m2    GFR est non-AA 44 (L) >60 ml/min/1.73m2    Calcium 9.3 8.5 - 10.1 MG/DL       Radiologic Studies -   No orders to display     CT Results  (Last 48 hours)               08/28/21 1305  CTA ABDOMEN PELV W CONT Final result    Impression:      1. No evidence to suggest active GI bleeding, acute aortic pathology, or other   acute inflammatory process in the abdomen or pelvis. 2. Large hiatal hernia without significant change. 3. Multiple hepatic and renal cysts are stable. 4. Advanced multilevel thoracolumbar spondylosis. Narrative:  EXAM:  CT ARTERIOGRAM ABDOMEN AND PELVIS       CLINICAL INDICATION/HISTORY: Lower GI bleed     > Additional: None. COMPARISON: Correlation with prior CT dated June 1, 2021     > Reference Exam: None. TECHNIQUE:   Pre-contrast imaging was performed. Subsequent CT angiogram of the   abdomen, and pelvis. Thin sagittal and coronal MPR and MIP reconstructions were   performed. Extensive separate workstation post processing was performed by [facility]   [facility] to include MIPs, color surface images, 3-D reconstructions, and   curve planar reformat imaging. One or more dose reduction techniques were used on this CT: automated exposure   control, adjustment of the mAs and/or kVp according to patient size, and   iterative reconstruction techniques.   The specific techniques used on this CT   exam have been documented in the patient's electronic medical record. Digital   Imaging and Communications in Medicine (DICOM) format image data are available   to nonaffiliated external healthcare facilities or entities on a secure, media   free, reciprocally searchable basis with patient authorization for at least a   12-month period after this study. _______________           FINDINGS:           --- CT ANGIOGRAM ---       The noncontrast imaging through the abdomen and pelvis demonstrates scattered   atheromatous calcifications of the tortuous aorta. No abnormal attenuation along   the wall of the aorta. There is some high density contrast material noted dependently in the stomach   which is unchanged on the postcontrast images. Postcontrast arterial and delayed   phase imaging demonstrates no extravasation of contrast into the small bowel or   colon either. There is no evidence of aortic dissection or other acute aortic pathology. --- CT ABDOMEN/PELVIS ---           LOWER CHEST: Dependent atelectasis. LIVER, BILIARY: Scattered hepatic hypodensities in the majority of which are too   small to characterize but unchanged and likely represent small cysts. No biliary   dilation. Gallbladder is unremarkable. PANCREAS: Normal.       SPLEEN: Normal.       ADRENALS: Normal.       KIDNEYS: Numerous cysts and subcentimeter low-density lesions throughout the   kidneys are unchanged. No hydronephrosis or suspicious mass. LYMPH NODES: No enlarged lymph nodes. GASTROINTESTINAL TRACT: Large hiatal hernia. No bowel dilation or wall   thickening. Mild scattered diverticulosis. PELVIC ORGANS: Calcified uterine fibroids noted. BONES: Chronic fracture deformity and advanced degenerative changes of the left   hip are noted. Advanced multilevel spondylosis including unchanged grade 2   anterolisthesis of L4-L5 with obliteration of the disc space but unchanged   appearance and morphology compared to the prior study.  Additional areas of   severe degenerative disc disease and degenerative fusion including at L1-L2. Advanced multilevel facet arthropathy. OTHER: No ascites. _______________                   CXR Results  (Last 48 hours)    None          Medications given in the ED-  Medications   pantoprazole (PROTONIX) injection 40 mg (40 mg IntraVENous Given 8/29/21 0554)   magnesium oxide (MAG-OX) tablet 400 mg (400 mg Oral Given 8/29/21 0622)   potassium chloride (K-DUR, KLOR-CON) SR tablet 40 mEq (40 mEq Oral Given 8/29/21 0635)   potassium chloride 10 mEq in 100 ml IVPB (0 mEq IntraVENous IV Completed 8/29/21 0723)   sodium chloride 0.9 % bolus infusion 500 mL (0 mL IntraVENous IV Completed 8/29/21 0715)         Medical Decision Making   I am the first provider for this patient. I reviewed the vital signs, available nursing notes, past medical history, past surgical history, family history and social history. Vital Signs-Reviewed the patient's vital signs. Pulse Oximetry Analysis - 100% on room air, not hypoxic     Cardiac Monitor:  Rate: 56 bpm  Rhythm: Sinus bradycardia    Records Reviewed: Nursing Notes and Old Medical Records    Provider Notes (Medical Decision Making): Dayday Ulloa is a 78 y.o. female presenting for continued blood in the stool and abdominal pain. Patient is hemodynamically stable. Patient's H&H are stable from prior visit and she does not have active bleeding here in the emergency department. Potassium and magnesium significantly depleted. Will replenish these and anticipate discharge with improvement in her potassium. Patient already has GI follow-up scheduled this week. Patient and her family understand and agree with this plan. Procedures:  Procedures    ED Course:   7:00 AM   Patient care will be transferred to Dr. Marsha Lucas from Dr. Leann Garsia.  Discussed available diagnostic results and care plan at length. Pt made aware of provider change.      10:05 AM patient's potassium is increasing. She will be able to take oral potassium at home as well. Her hemoglobin is stable and nonconcerning. Patient was cautioned to use MiraLAX and docusate for her constipation. We will also treat what appears to be a urinary tract infection. Patient has GI follow-up next week. Diagnosis and Disposition     Critical Care: 0 minutes    DISCHARGE NOTE:  10:06 AM   Camille Calderon's  results have been reviewed with her. She has been counseled regarding her diagnosis, treatment, and plan. She verbally conveys understanding and agreement of the signs, symptoms, diagnosis, treatment and prognosis and additionally agrees to follow up as discussed. She also agrees with the care-plan and conveys that all of her questions have been answered. I have also provided discharge instructions for her that include: educational information regarding their diagnosis and treatment, and list of reasons why they would want to return to the ED prior to their follow-up appointment, should her condition change. She has been provided with education for proper emergency department utilization. CLINICAL IMPRESSION:    1. Hypomagnesemia    2. Hypokalemia    3. Rectal bleeding    4. Acute cystitis without hematuria        PLAN:  1. D/C Home  2. Current Discharge Medication List      START taking these medications    Details   potassium chloride (K-DUR, KLOR-CON) 20 mEq tablet Take 1 Tablet by mouth two (2) times a day for 7 days. Qty: 14 Tablet, Refills: 0  Start date: 8/29/2021, End date: 9/5/2021      cephALEXin (Keflex) 500 mg capsule Take 1 Capsule by mouth two (2) times a day for 7 days. Qty: 14 Capsule, Refills: 0  Start date: 8/29/2021, End date: 9/5/2021      polyethylene glycol (Miralax) 17 gram/dose powder Take 17 g by mouth daily.  1 tablespoon with 8 oz of water daily  Qty: 116 g, Refills: 0  Start date: 8/29/2021      docusate sodium (COLACE) 100 mg capsule Take 1 Capsule by mouth two (2) times a day for 90 days.  Qty: 60 Capsule, Refills: 2  Start date: 8/29/2021, End date: 11/27/2021           3. Follow-up Information     Follow up With Specialties Details Why Contact Info    Luann Leon MD Internal Medicine Schedule an appointment as soon as possible for a visit in 1 week  [address]      George Vasquez MD Gastroenterology Schedule an appointment as soon as possible for a visit in 1 week For follow up from Emergency Department visit. [address] [address] [phone]      [facility] EMERGENCY DEPT Emergency Medicine  As needed; If symptoms worsen [address] [address]  [phone]        _______________________________      Please note that this dictation was completed with [email or web address], the computer voice recognition software. Quite often unanticipated grammatical, syntax, homophones, and other interpretive errors are inadvertently transcribed by the computer software. Please disregard these errors. Please excuse any errors that have escaped final proofreading.

## 2021-08-29 NOTE — ED TRIAGE NOTES
Pt presents to ED via w/c from triage;  Pt c/o abd cramping and one episode of blood in stool since leaving ED yesterday

## 2022-03-18 PROBLEM — I50.32 DIASTOLIC CHF, CHRONIC (HCC): Status: ACTIVE | Noted: 2018-01-15

## 2022-03-18 PROBLEM — E88.09 HYPOALBUMINEMIA: Status: ACTIVE | Noted: 2018-01-25

## 2022-03-19 PROBLEM — K21.9 GERD (GASTROESOPHAGEAL REFLUX DISEASE): Status: ACTIVE | Noted: 2018-01-15

## 2022-03-19 PROBLEM — K44.9 HIATAL HERNIA WITH GERD: Status: ACTIVE | Noted: 2021-07-01

## 2022-03-19 PROBLEM — E78.5 HLD (HYPERLIPIDEMIA): Status: ACTIVE | Noted: 2018-01-15

## 2022-03-19 PROBLEM — K62.89 PROCTITIS: Status: ACTIVE | Noted: 2018-01-17

## 2022-03-19 PROBLEM — K21.9 HIATAL HERNIA WITH GERD: Status: ACTIVE | Noted: 2021-07-01

## 2023-12-01 ENCOUNTER — HOSPITAL ENCOUNTER (EMERGENCY)
Facility: HOSPITAL | Age: 78
Discharge: HOME | End: 2023-12-01
Attending: EMERGENCY MEDICINE
Payer: MEDICARE

## 2023-12-01 VITALS
HEART RATE: 81 BPM | OXYGEN SATURATION: 98 % | BODY MASS INDEX: 29.23 KG/M2 | SYSTOLIC BLOOD PRESSURE: 133 MMHG | DIASTOLIC BLOOD PRESSURE: 76 MMHG | TEMPERATURE: 97.8 F | WEIGHT: 145 LBS | HEIGHT: 59 IN | RESPIRATION RATE: 18 BRPM

## 2023-12-01 DIAGNOSIS — K59.03 DRUG-INDUCED CONSTIPATION: Primary | ICD-10-CM

## 2023-12-01 PROCEDURE — 99283 EMERGENCY DEPT VISIT LOW MDM: CPT

## 2023-12-01 RX ORDER — DICYCLOMINE HCL 20 MG
20 TABLET ORAL 4 TIMES DAILY
Qty: 10 TABLET | Refills: 0 | Status: SHIPPED | OUTPATIENT
Start: 2023-12-01 | End: 2023-12-01 | Stop reason: SDUPTHER

## 2023-12-01 RX ORDER — POLYETHYLENE GLYCOL 3350, SODIUM CHLORIDE, POTASSIUM CHLORIDE, SODIUM BICARBONATE, AND SODIUM SULFATE 240; 5.84; 2.98; 6.72; 22.72 G/4L; G/4L; G/4L; G/4L; G/4L
4000 POWDER, FOR SOLUTION ORAL ONCE
Qty: 4000 ML | Refills: 0 | Status: SHIPPED | OUTPATIENT
Start: 2023-12-01 | End: 2023-12-01 | Stop reason: SDUPTHER

## 2023-12-01 RX ORDER — DICYCLOMINE HCL 20 MG
20 TABLET ORAL 4 TIMES DAILY
Qty: 10 TABLET | Refills: 0 | Status: SHIPPED | OUTPATIENT
Start: 2023-12-01

## 2023-12-01 RX ORDER — POLYETHYLENE GLYCOL 3350, SODIUM CHLORIDE, POTASSIUM CHLORIDE, SODIUM BICARBONATE, AND SODIUM SULFATE 240; 5.84; 2.98; 6.72; 22.72 G/4L; G/4L; G/4L; G/4L; G/4L
4000 POWDER, FOR SOLUTION ORAL ONCE
Qty: 4000 ML | Refills: 0 | Status: SHIPPED | OUTPATIENT
Start: 2023-12-01 | End: 2023-12-01

## 2023-12-01 NOTE — ED PROVIDER NOTES
[facility] EMERGENCY DEPT  EMERGENCY DEPARTMENT HISTORY AND PHYSICAL EXAM      Date: 12/1/2023  Patient Name: Sudhakar Galindo  MRN: [id number]  [address] 1945  Date of evaluation: 12/1/2023  Provider: Abhishek France MD   Note Started: 7:07 AM EST 12/1/23    HISTORY OF PRESENT ILLNESS     Chief Complaint   Patient presents with    Constipation     X 1 week been taking dulcolax and linzess and states she is now going to the bathroom too much. History Provided By: Patient    HPI: Sudhakar Galindo is a 70 y.o. female who says for the past week she has been taking Dulcolax and Linzess and having difficulty with constipation versus diarrhea. Patient states that she just wants to be cleaned out completely. She denies any abdominal pain currently but says at times she has cramps. She also denies any fever, chills, nausea, vomiting, chest pain, shortness of breath, rash, headache, night sweats. Symptoms are mild to moderate and she is asymptomatic at this point    PAST MEDICAL HISTORY   Past Medical History:  Past Medical History:   Diagnosis Date    Arthritis     Breast cancer ([address])     Cancer ([address])     right breast    GERD (gastroesophageal reflux disease)     High cholesterol     Hypertension     Intractable vomiting 7/1/2021    Other ill-defined conditions(799.89)     high cholestrol    Vertigo        Past Surgical History:  Past Surgical History:   Procedure Laterality Date    BREAST LUMPECTOMY      GI      colonosocpy    ORTHOPEDIC SURGERY      excision of cyst left hand    ORTHOPEDIC SURGERY      total knee replacement bilateral    [place] ABDOMEN SURGERY PROC UNLISTED      removal of tumor abdomen    TOTAL KNEE ARTHROPLASTY      TUBAL LIGATION      TUMOR REMOVAL         Family History:  No family history on file. Social History:  Social History     Tobacco Use    Smoking status: Passive Smoke Exposure - Never Smoker    Smokeless tobacco: Never   Substance Use Topics    Alcohol use: No    Drug use:  No

## 2023-12-01 NOTE — ED NOTES
Pt assisted to restroom and changed into paper scrubs. Pt daughter arrived at bedside. Discharge instructions reviewed with pt and daughter.       Cornelia Castillo RN  12/01/23 1083

## 2024-01-22 ENCOUNTER — APPOINTMENT (OUTPATIENT)
Facility: HOSPITAL | Age: 79
End: 2024-01-22
Payer: MEDICARE

## 2024-01-22 ENCOUNTER — HOSPITAL ENCOUNTER (EMERGENCY)
Facility: HOSPITAL | Age: 79
Discharge: HOME | End: 2024-01-22
Attending: STUDENT IN AN ORGANIZED HEALTH CARE EDUCATION/TRAINING PROGRAM
Payer: MEDICARE

## 2024-01-22 VITALS
OXYGEN SATURATION: 96 % | WEIGHT: 133 LBS | BODY MASS INDEX: 26.81 KG/M2 | SYSTOLIC BLOOD PRESSURE: 137 MMHG | DIASTOLIC BLOOD PRESSURE: 82 MMHG | HEART RATE: 65 BPM | TEMPERATURE: 98.4 F | HEIGHT: 59 IN | RESPIRATION RATE: 18 BRPM

## 2024-01-22 DIAGNOSIS — K62.89 PROCTITIS: ICD-10-CM

## 2024-01-22 DIAGNOSIS — R10.84 GENERALIZED ABDOMINAL PAIN: ICD-10-CM

## 2024-01-22 DIAGNOSIS — K59.00 CONSTIPATION, UNSPECIFIED CONSTIPATION TYPE: Primary | ICD-10-CM

## 2024-01-22 LAB
ALBUMIN SERPL-MCNC: 3 G/DL (ref 3.4–5)
ALBUMIN/GLOB SERPL: 0.7 (ref 0.8–1.7)
ALP SERPL-CCNC: 108 U/L (ref 45–117)
ALT SERPL-CCNC: 24 U/L (ref 13–56)
ANION GAP SERPL CALC-SCNC: 5 MMOL/L (ref 3–18)
AST SERPL-CCNC: 33 U/L (ref 10–38)
BASOPHILS # BLD: 0 K/UL (ref 0–0.1)
BASOPHILS NFR BLD: 0 % (ref 0–2)
BILIRUB SERPL-MCNC: 0.5 MG/DL (ref 0.2–1)
BUN SERPL-MCNC: 13 MG/DL (ref 7–18)
BUN/CREAT SERPL: 11 (ref 12–20)
CALCIUM SERPL-MCNC: 9.2 MG/DL (ref 8.5–10.1)
CHLORIDE SERPL-SCNC: 101 MMOL/L (ref 100–111)
CO2 SERPL-SCNC: 27 MMOL/L (ref 21–32)
CREAT SERPL-MCNC: 1.16 MG/DL (ref 0.6–1.3)
DIFFERENTIAL METHOD BLD: ABNORMAL
EKG ATRIAL RATE: 61 BPM
EKG DIAGNOSIS: NORMAL
EKG P AXIS: 58 DEGREES
EKG P-R INTERVAL: 204 MS
EKG Q-T INTERVAL: 438 MS
EKG QRS DURATION: 72 MS
EKG QTC CALCULATION (BAZETT): 440 MS
EKG R AXIS: 44 DEGREES
EKG T AXIS: 27 DEGREES
EKG VENTRICULAR RATE: 61 BPM
EOSINOPHIL # BLD: 0.1 K/UL (ref 0–0.4)
EOSINOPHIL NFR BLD: 2 % (ref 0–5)
ERYTHROCYTE [DISTWIDTH] IN BLOOD BY AUTOMATED COUNT: 14.3 % (ref 11.6–14.5)
GLOBULIN SER CALC-MCNC: 4.4 G/DL (ref 2–4)
GLUCOSE SERPL-MCNC: 120 MG/DL (ref 74–99)
HCT VFR BLD AUTO: 46 % (ref 35–45)
HGB BLD-MCNC: 15 G/DL (ref 12–16)
IMM GRANULOCYTES # BLD AUTO: 0 K/UL (ref 0–0.04)
IMM GRANULOCYTES NFR BLD AUTO: 0 % (ref 0–0.5)
LIPASE SERPL-CCNC: 21 U/L (ref 13–75)
LYMPHOCYTES # BLD: 2.9 K/UL (ref 0.9–3.6)
LYMPHOCYTES NFR BLD: 41 % (ref 21–52)
MCH RBC QN AUTO: 28 PG (ref 24–34)
MCHC RBC AUTO-ENTMCNC: 32.6 G/DL (ref 31–37)
MCV RBC AUTO: 85.8 FL (ref 78–100)
MONOCYTES # BLD: 0.5 K/UL (ref 0.05–1.2)
MONOCYTES NFR BLD: 7 % (ref 3–10)
NEUTS SEG # BLD: 3.5 K/UL (ref 1.8–8)
NEUTS SEG NFR BLD: 49 % (ref 40–73)
NRBC # BLD: 0 K/UL (ref 0–0.01)
NRBC BLD-RTO: 0 PER 100 WBC
NT PRO BNP: 38 PG/ML (ref 0–1800)
PLATELET # BLD AUTO: 266 K/UL (ref 135–420)
PMV BLD AUTO: 9.9 FL (ref 9.2–11.8)
POTASSIUM SERPL-SCNC: 3.4 MMOL/L (ref 3.5–5.5)
PROT SERPL-MCNC: 7.4 G/DL (ref 6.4–8.2)
RBC # BLD AUTO: 5.36 M/UL (ref 4.2–5.3)
SODIUM SERPL-SCNC: 133 MMOL/L (ref 136–145)
TROPONIN I SERPL HS-MCNC: 15 NG/L (ref 0–54)
WBC # BLD AUTO: 7.1 K/UL (ref 4.6–13.2)

## 2024-01-22 PROCEDURE — 80053 COMPREHEN METABOLIC PANEL: CPT

## 2024-01-22 PROCEDURE — 74018 RADEX ABDOMEN 1 VIEW: CPT

## 2024-01-22 PROCEDURE — 6360000004 HC RX CONTRAST MEDICATION: Performed by: STUDENT IN AN ORGANIZED HEALTH CARE EDUCATION/TRAINING PROGRAM

## 2024-01-22 PROCEDURE — 84484 ASSAY OF TROPONIN QUANT: CPT

## 2024-01-22 PROCEDURE — 99285 EMERGENCY DEPT VISIT HI MDM: CPT

## 2024-01-22 PROCEDURE — 83880 ASSAY OF NATRIURETIC PEPTIDE: CPT

## 2024-01-22 PROCEDURE — 85025 COMPLETE CBC W/AUTO DIFF WBC: CPT

## 2024-01-22 PROCEDURE — 6360000002 HC RX W HCPCS: Performed by: STUDENT IN AN ORGANIZED HEALTH CARE EDUCATION/TRAINING PROGRAM

## 2024-01-22 PROCEDURE — 96374 THER/PROPH/DIAG INJ IV PUSH: CPT

## 2024-01-22 PROCEDURE — 74177 CT ABD & PELVIS W/CONTRAST: CPT

## 2024-01-22 PROCEDURE — 83690 ASSAY OF LIPASE: CPT

## 2024-01-22 PROCEDURE — 93005 ELECTROCARDIOGRAM TRACING: CPT | Performed by: STUDENT IN AN ORGANIZED HEALTH CARE EDUCATION/TRAINING PROGRAM

## 2024-01-22 RX ORDER — MORPHINE SULFATE 4 MG/ML
4 INJECTION, SOLUTION INTRAMUSCULAR; INTRAVENOUS
Status: COMPLETED | OUTPATIENT
Start: 2024-01-22 | End: 2024-01-22

## 2024-01-22 RX ORDER — SENNA AND DOCUSATE SODIUM 50; 8.6 MG/1; MG/1
1 TABLET, FILM COATED ORAL DAILY
Qty: 14 TABLET | Refills: 0 | Status: SHIPPED | OUTPATIENT
Start: 2024-01-22 | End: 2024-01-22

## 2024-01-22 RX ORDER — POLYETHYLENE GLYCOL 3350 17 G/17G
17 POWDER, FOR SOLUTION ORAL DAILY PRN
Qty: 510 G | Refills: 0 | Status: SHIPPED | OUTPATIENT
Start: 2024-01-22 | End: 2024-02-21

## 2024-01-22 RX ORDER — POLYETHYLENE GLYCOL 3350 17 G/17G
17 POWDER, FOR SOLUTION ORAL DAILY PRN
Qty: 510 G | Refills: 0 | Status: SHIPPED | OUTPATIENT
Start: 2024-01-22 | End: 2024-01-22

## 2024-01-22 RX ORDER — SENNA AND DOCUSATE SODIUM 50; 8.6 MG/1; MG/1
1 TABLET, FILM COATED ORAL DAILY
Qty: 14 TABLET | Refills: 0 | Status: SHIPPED | OUTPATIENT
Start: 2024-01-22 | End: 2024-02-05

## 2024-01-22 RX ADMIN — MORPHINE SULFATE 4 MG: 4 INJECTION, SOLUTION INTRAMUSCULAR; INTRAVENOUS at 12:08

## 2024-01-22 RX ADMIN — IOPAMIDOL 100 ML: 612 INJECTION, SOLUTION INTRAVENOUS at 13:02

## 2024-01-22 NOTE — ED PROVIDER NOTES
Eosinophils % 2 0 - 5 %    Basophils % 0 0 - 2 %    Immature Granulocytes 0 0.0 - 0.5 %    Neutrophils Absolute 3.5 1.8 - 8.0 K/UL    Lymphocytes Absolute 2.9 0.9 - 3.6 K/UL    Monocytes Absolute 0.5 0.05 - 1.2 K/UL    Eosinophils Absolute 0.1 0.0 - 0.4 K/UL    Basophils Absolute 0.0 0.0 - 0.1 K/UL    Absolute Immature Granulocyte 0.0 0.00 - 0.04 K/UL    Differential Type AUTOMATED     CMP    Collection Time: 01/22/24 11:50 AM   Result Value Ref Range    Sodium 133 (L) 136 - 145 mmol/L    Potassium 3.4 (L) 3.5 - 5.5 mmol/L    Chloride 101 100 - 111 mmol/L    CO2 27 21 - 32 mmol/L    Anion Gap 5 3.0 - 18 mmol/L    Glucose 120 (H) 74 - 99 mg/dL    BUN 13 7.0 - 18 MG/DL    Creatinine 1.16 0.6 - 1.3 MG/DL    Bun/Cre Ratio 11 (L) 12 - 20      Est, Glom Filt Rate 49 (L) >60 ml/min/1.73m2    Calcium 9.2 8.5 - 10.1 MG/DL    Total Bilirubin 0.5 0.2 - 1.0 MG/DL    ALT 24 13 - 56 U/L    AST 33 10 - 38 U/L    Alk Phosphatase 108 45 - 117 U/L    Total Protein 7.4 6.4 - 8.2 g/dL    Albumin 3.0 (L) 3.4 - 5.0 g/dL    Globulin 4.4 (H) 2.0 - 4.0 g/dL    Albumin/Globulin Ratio 0.7 (L) 0.8 - 1.7     Lipase    Collection Time: 01/22/24 11:50 AM   Result Value Ref Range    Lipase 21 13 - 75 U/L   Brain Natriuretic Peptide    Collection Time: 01/22/24 11:50 AM   Result Value Ref Range    NT Pro-BNP 38 0 - 1,800 PG/ML   Troponin    Collection Time: 01/22/24 11:50 AM   Result Value Ref Range    Troponin, High Sensitivity 15 0 - 54 ng/L   EKG 12 Lead    Collection Time: 01/22/24 12:16 PM   Result Value Ref Range    Ventricular Rate 61 BPM    Atrial Rate 61 BPM    P-R Interval 204 ms    QRS Duration 72 ms    Q-T Interval 438 ms    QTc Calculation (Bazett) 440 ms    P Axis 58 degrees    R Axis 44 degrees    T Axis 27 degrees    Diagnosis       Normal sinus rhythm  Septal infarct , age undetermined  Abnormal ECG  When compared with ECG of 01-JUL-2021 04:59,  Septal infarct is now present         Radiologic Studies -   CT ABDOMEN PELVIS W IV

## 2024-01-22 NOTE — DISCHARGE INSTRUCTIONS
You were evaluated for constipation, abdominal pain .  Based on your work-up it was deemed that she was stable for discharge.  Please  your medication of senna , mirilax which was prescribed to you.  Please follow-up with your primary care physician if you have any further concerns and go over your work-up.  If you experience any chest pain, shortness of breath, worsening abdominal pain, vomiting blood, worsening headache, seizures, or any worsening of your symptoms please return to the emergency department immediately.  If you have any pending results or any further questions please contact the emergency department at [phone]

## 2024-01-23 ASSESSMENT — ENCOUNTER SYMPTOMS
ABDOMINAL PAIN: 1
BACK PAIN: 0
NAUSEA: 0
CONSTIPATION: 1
DIARRHEA: 0
SHORTNESS OF BREATH: 0

## 2024-05-26 ENCOUNTER — APPOINTMENT (OUTPATIENT)
Facility: HOSPITAL | Age: 79
End: 2024-05-26
Payer: MEDICARE

## 2024-05-26 ENCOUNTER — HOSPITAL ENCOUNTER (EMERGENCY)
Facility: HOSPITAL | Age: 79
Discharge: HOME | End: 2024-05-27
Attending: STUDENT IN AN ORGANIZED HEALTH CARE EDUCATION/TRAINING PROGRAM
Payer: MEDICARE

## 2024-05-26 VITALS
OXYGEN SATURATION: 98 % | HEIGHT: 59 IN | BODY MASS INDEX: 26.21 KG/M2 | WEIGHT: 130 LBS | DIASTOLIC BLOOD PRESSURE: 138 MMHG | TEMPERATURE: 97.7 F | SYSTOLIC BLOOD PRESSURE: 158 MMHG | HEART RATE: 70 BPM | RESPIRATION RATE: 20 BRPM

## 2024-05-26 DIAGNOSIS — R33.9 URINARY RETENTION: ICD-10-CM

## 2024-05-26 DIAGNOSIS — K59.00 CONSTIPATION, UNSPECIFIED CONSTIPATION TYPE: Primary | ICD-10-CM

## 2024-05-26 LAB
ALBUMIN SERPL-MCNC: 3.2 G/DL (ref 3.4–5)
ALBUMIN/GLOB SERPL: 0.8 (ref 0.8–1.7)
ALP SERPL-CCNC: 102 U/L (ref 45–117)
ALT SERPL-CCNC: 16 U/L (ref 13–56)
ANION GAP SERPL CALC-SCNC: 9 MMOL/L (ref 3–18)
APPEARANCE UR: CLEAR
AST SERPL-CCNC: 26 U/L (ref 10–38)
BASOPHILS # BLD: 0 K/UL (ref 0–0.1)
BASOPHILS NFR BLD: 1 % (ref 0–2)
BILIRUB SERPL-MCNC: 0.6 MG/DL (ref 0.2–1)
BILIRUB UR QL: NEGATIVE
BUN SERPL-MCNC: 17 MG/DL (ref 7–18)
BUN/CREAT SERPL: 14 (ref 12–20)
CALCIUM SERPL-MCNC: 9.4 MG/DL (ref 8.5–10.1)
CHLORIDE SERPL-SCNC: 101 MMOL/L (ref 100–111)
CO2 SERPL-SCNC: 24 MMOL/L (ref 21–32)
COLOR UR: YELLOW
CREAT SERPL-MCNC: 1.24 MG/DL (ref 0.6–1.3)
DIFFERENTIAL METHOD BLD: NORMAL
EKG ATRIAL RATE: 67 BPM
EKG DIAGNOSIS: NORMAL
EKG P AXIS: 69 DEGREES
EKG P-R INTERVAL: 206 MS
EKG Q-T INTERVAL: 394 MS
EKG QRS DURATION: 82 MS
EKG QTC CALCULATION (BAZETT): 416 MS
EKG R AXIS: -21 DEGREES
EKG T AXIS: 2 DEGREES
EKG VENTRICULAR RATE: 67 BPM
EOSINOPHIL # BLD: 0.1 K/UL (ref 0–0.4)
EOSINOPHIL NFR BLD: 1 % (ref 0–5)
ERYTHROCYTE [DISTWIDTH] IN BLOOD BY AUTOMATED COUNT: 14.5 % (ref 11.6–14.5)
GLOBULIN SER CALC-MCNC: 4 G/DL (ref 2–4)
GLUCOSE SERPL-MCNC: 116 MG/DL (ref 74–99)
GLUCOSE UR STRIP.AUTO-MCNC: NEGATIVE MG/DL
HCT VFR BLD AUTO: 44.9 % (ref 35–45)
HGB BLD-MCNC: 14.8 G/DL (ref 12–16)
HGB UR QL STRIP: NEGATIVE
IMM GRANULOCYTES # BLD AUTO: 0 K/UL (ref 0–0.04)
IMM GRANULOCYTES NFR BLD AUTO: 0 % (ref 0–0.5)
KETONES UR QL STRIP.AUTO: NEGATIVE MG/DL
LEUKOCYTE ESTERASE UR QL STRIP.AUTO: NEGATIVE
LIPASE SERPL-CCNC: 20 U/L (ref 13–75)
LYMPHOCYTES # BLD: 2.8 K/UL (ref 0.9–3.6)
LYMPHOCYTES NFR BLD: 43 % (ref 21–52)
MCH RBC QN AUTO: 28.4 PG (ref 24–34)
MCHC RBC AUTO-ENTMCNC: 33 G/DL (ref 31–37)
MCV RBC AUTO: 86 FL (ref 78–100)
MONOCYTES # BLD: 0.4 K/UL (ref 0.05–1.2)
MONOCYTES NFR BLD: 7 % (ref 3–10)
NEUTS SEG # BLD: 3.2 K/UL (ref 1.8–8)
NEUTS SEG NFR BLD: 49 % (ref 40–73)
NITRITE UR QL STRIP.AUTO: NEGATIVE
NRBC # BLD: 0 K/UL (ref 0–0.01)
NRBC BLD-RTO: 0 PER 100 WBC
PH UR STRIP: 6 (ref 5–8)
PLATELET # BLD AUTO: 281 K/UL (ref 135–420)
PMV BLD AUTO: 9.8 FL (ref 9.2–11.8)
POTASSIUM SERPL-SCNC: 3.5 MMOL/L (ref 3.5–5.5)
PROT SERPL-MCNC: 7.2 G/DL (ref 6.4–8.2)
PROT UR STRIP-MCNC: NEGATIVE MG/DL
RBC # BLD AUTO: 5.22 M/UL (ref 4.2–5.3)
SODIUM SERPL-SCNC: 134 MMOL/L (ref 136–145)
SP GR UR REFRACTOMETRY: 1.02 (ref 1–1.03)
TROPONIN I SERPL HS-MCNC: 7 NG/L (ref 0–54)
UROBILINOGEN UR QL STRIP.AUTO: 0.2 EU/DL (ref 0.2–1)
WBC # BLD AUTO: 6.5 K/UL (ref 4.6–13.2)

## 2024-05-26 PROCEDURE — 74177 CT ABD & PELVIS W/CONTRAST: CPT

## 2024-05-26 PROCEDURE — 51702 INSERT TEMP BLADDER CATH: CPT

## 2024-05-26 PROCEDURE — 6360000004 HC RX CONTRAST MEDICATION: Performed by: STUDENT IN AN ORGANIZED HEALTH CARE EDUCATION/TRAINING PROGRAM

## 2024-05-26 PROCEDURE — 85025 COMPLETE CBC W/AUTO DIFF WBC: CPT

## 2024-05-26 PROCEDURE — 93010 ELECTROCARDIOGRAM REPORT: CPT | Performed by: INTERNAL MEDICINE

## 2024-05-26 PROCEDURE — 81003 URINALYSIS AUTO W/O SCOPE: CPT

## 2024-05-26 PROCEDURE — 83690 ASSAY OF LIPASE: CPT

## 2024-05-26 PROCEDURE — 84484 ASSAY OF TROPONIN QUANT: CPT

## 2024-05-26 PROCEDURE — 93005 ELECTROCARDIOGRAM TRACING: CPT | Performed by: STUDENT IN AN ORGANIZED HEALTH CARE EDUCATION/TRAINING PROGRAM

## 2024-05-26 PROCEDURE — 96376 TX/PRO/DX INJ SAME DRUG ADON: CPT

## 2024-05-26 PROCEDURE — 51798 US URINE CAPACITY MEASURE: CPT

## 2024-05-26 PROCEDURE — 6360000002 HC RX W HCPCS: Performed by: STUDENT IN AN ORGANIZED HEALTH CARE EDUCATION/TRAINING PROGRAM

## 2024-05-26 PROCEDURE — 99285 EMERGENCY DEPT VISIT HI MDM: CPT

## 2024-05-26 PROCEDURE — 96374 THER/PROPH/DIAG INJ IV PUSH: CPT

## 2024-05-26 PROCEDURE — 80053 COMPREHEN METABOLIC PANEL: CPT

## 2024-05-26 RX ORDER — MORPHINE SULFATE 4 MG/ML
4 INJECTION, SOLUTION INTRAMUSCULAR; INTRAVENOUS
Status: COMPLETED | OUTPATIENT
Start: 2024-05-26 | End: 2024-05-26

## 2024-05-26 RX ADMIN — MORPHINE SULFATE 4 MG: 4 INJECTION, SOLUTION INTRAMUSCULAR; INTRAVENOUS at 19:53

## 2024-05-26 RX ADMIN — MORPHINE SULFATE 4 MG: 4 INJECTION, SOLUTION INTRAMUSCULAR; INTRAVENOUS at 20:29

## 2024-05-26 RX ADMIN — IOPAMIDOL 100 ML: 612 INJECTION, SOLUTION INTRAVENOUS at 21:01

## 2024-05-26 ASSESSMENT — LIFESTYLE VARIABLES
HOW MANY STANDARD DRINKS CONTAINING ALCOHOL DO YOU HAVE ON A TYPICAL DAY: PATIENT DOES NOT DRINK
HOW OFTEN DO YOU HAVE A DRINK CONTAINING ALCOHOL: NEVER

## 2024-05-26 ASSESSMENT — PAIN SCALES - GENERAL
PAINLEVEL_OUTOF10: 10
PAINLEVEL_OUTOF10: 10

## 2024-05-26 ASSESSMENT — PAIN - FUNCTIONAL ASSESSMENT: PAIN_FUNCTIONAL_ASSESSMENT: 0-10

## 2024-05-26 NOTE — ED TRIAGE NOTES
Pt arrived via EMS from home with complaints of constipation. Pt reports having diarrhea yesterday. Has taken stool softener this morning with no relief. Reports 10/10 abdominal pain.

## 2024-05-27 RX ORDER — POLYETHYLENE GLYCOL 3350 17 G/17G
17 POWDER, FOR SOLUTION ORAL 2 TIMES DAILY
Qty: 10 EACH | Refills: 0 | Status: SHIPPED | OUTPATIENT
Start: 2024-05-27 | End: 2024-06-01

## 2024-05-27 ASSESSMENT — ENCOUNTER SYMPTOMS
CHEST TIGHTNESS: 0
SHORTNESS OF BREATH: 0

## 2024-05-27 ASSESSMENT — PAIN SCALES - GENERAL: PAINLEVEL_OUTOF10: 5

## 2024-05-27 NOTE — ED PROVIDER NOTES
EMERGENCY DEPARTMENT HISTORY AND PHYSICAL EXAM    2:22 AM      Date: 5/26/2024  Patient Name: Kathleen Julian    History of Presenting Illness     Chief Complaint   Patient presents with    Constipation       History From: Patient    Kathleen Julian is a 79 y.o. female   79-year-old female with history of hyperlipidemia, hypertension here for evaluation of constipation.  Reports constipation for the last day.  Reports loose stools prior.  Reports associated rectal pain as well as generalized abdominal discomfort.  No vomiting.  No reported change urination or bowel movements.  No fevers.  No chest pain or shortness of breath           Nursing Notes were all reviewed and agreed with or any disagreements were addressed in the HPI.    PCP: Oxana Fair APRN - NP    No current facility-administered medications for this encounter.     Current Outpatient Medications   Medication Sig Dispense Refill    polyethylene glycol (GLYCOLAX) 17 GM/SCOOP powder Take 17 g by mouth 2 times daily for 5 days 10 each 0    dicyclomine (BENTYL) 20 MG tablet Take 1 tablet by mouth 4 times daily 10 tablet 0    amLODIPine (NORVASC) 5 MG tablet Take 5 mg by mouth daily      aspirin 81 MG EC tablet Take 81 mg by mouth daily      ergocalciferol (ERGOCALCIFEROL) 1.25 MG (70477 UT) capsule Take 50,000 Units by mouth every 7 days      hydroCHLOROthiazide (HYDRODIURIL) 25 MG tablet Take 25 mg by mouth daily      lovastatin (MEVACOR) 40 MG tablet Take 40 mg by mouth      metoprolol tartrate (LOPRESSOR) 50 MG tablet Take 50 mg by mouth 2 times daily      pantoprazole (PROTONIX) 40 MG tablet Take 40 mg by mouth daily      spironolactone (ALDACTONE) 25 MG tablet Take 25 mg by mouth daily      sucralfate (CARAFATE) 1 GM tablet Take 1 g by mouth 4 times daily         Past History     Past Medical History:  Past Medical History:   Diagnosis Date    Arthritis     Breast cancer ([facility])     Cancer ([facility])     right breast    GERD (gastroesophageal

## 2024-09-23 ENCOUNTER — HOSPITAL ENCOUNTER (EMERGENCY)
Facility: HOSPITAL | Age: 79
Discharge: HOME | End: 2024-09-23
Payer: MEDICARE

## 2024-09-23 ENCOUNTER — APPOINTMENT (OUTPATIENT)
Facility: HOSPITAL | Age: 79
End: 2024-09-23
Payer: MEDICARE

## 2024-09-23 VITALS
HEIGHT: 59 IN | RESPIRATION RATE: 16 BRPM | DIASTOLIC BLOOD PRESSURE: 79 MMHG | WEIGHT: 130 LBS | BODY MASS INDEX: 26.21 KG/M2 | SYSTOLIC BLOOD PRESSURE: 142 MMHG | TEMPERATURE: 98.3 F | HEART RATE: 77 BPM | OXYGEN SATURATION: 100 %

## 2024-09-23 DIAGNOSIS — M19.031 OSTEOARTHRITIS OF BOTH WRISTS, UNSPECIFIED OSTEOARTHRITIS TYPE: ICD-10-CM

## 2024-09-23 DIAGNOSIS — M19.032 OSTEOARTHRITIS OF BOTH WRISTS, UNSPECIFIED OSTEOARTHRITIS TYPE: ICD-10-CM

## 2024-09-23 DIAGNOSIS — M25.432 SWELLING OF BOTH WRISTS: Primary | ICD-10-CM

## 2024-09-23 DIAGNOSIS — M25.431 SWELLING OF BOTH WRISTS: Primary | ICD-10-CM

## 2024-09-23 PROCEDURE — 99283 EMERGENCY DEPT VISIT LOW MDM: CPT

## 2024-09-23 PROCEDURE — 73110 X-RAY EXAM OF WRIST: CPT

## 2024-09-23 PROCEDURE — 6370000000 HC RX 637 (ALT 250 FOR IP): Performed by: NURSE PRACTITIONER

## 2024-09-23 RX ORDER — ACETAMINOPHEN 325 MG/1
650 TABLET ORAL
Status: COMPLETED | OUTPATIENT
Start: 2024-09-23 | End: 2024-09-23

## 2024-09-23 RX ADMIN — ACETAMINOPHEN 650 MG: 325 TABLET ORAL at 10:11

## 2024-09-23 ASSESSMENT — PAIN SCALES - GENERAL: PAINLEVEL_OUTOF10: 10

## 2024-09-23 ASSESSMENT — PAIN - FUNCTIONAL ASSESSMENT: PAIN_FUNCTIONAL_ASSESSMENT: 0-10

## 2024-11-17 ENCOUNTER — APPOINTMENT (OUTPATIENT)
Facility: HOSPITAL | Age: 79
End: 2024-11-17
Payer: MEDICARE

## 2024-11-17 ENCOUNTER — HOSPITAL ENCOUNTER (EMERGENCY)
Facility: HOSPITAL | Age: 79
Discharge: HOME | End: 2024-11-17
Payer: MEDICARE

## 2024-11-17 VITALS
DIASTOLIC BLOOD PRESSURE: 83 MMHG | SYSTOLIC BLOOD PRESSURE: 144 MMHG | TEMPERATURE: 98.1 F | BODY MASS INDEX: 26.21 KG/M2 | OXYGEN SATURATION: 96 % | HEIGHT: 59 IN | WEIGHT: 130 LBS | RESPIRATION RATE: 18 BRPM | HEART RATE: 65 BPM

## 2024-11-17 DIAGNOSIS — M10.9 GOUTY ARTHRITIS OF RIGHT GREAT TOE: Primary | ICD-10-CM

## 2024-11-17 PROCEDURE — 73630 X-RAY EXAM OF FOOT: CPT

## 2024-11-17 PROCEDURE — 99283 EMERGENCY DEPT VISIT LOW MDM: CPT

## 2024-11-17 PROCEDURE — 6370000000 HC RX 637 (ALT 250 FOR IP): Performed by: PHYSICIAN ASSISTANT

## 2024-11-17 RX ORDER — COLCHICINE 0.6 MG/1
1.2 TABLET ORAL
Status: COMPLETED | OUTPATIENT
Start: 2024-11-17 | End: 2024-11-17

## 2024-11-17 RX ORDER — COLCHICINE 0.6 MG/1
0.6 TABLET ORAL 2 TIMES DAILY
Qty: 20 TABLET | Refills: 0 | Status: SHIPPED | OUTPATIENT
Start: 2024-11-17 | End: 2024-11-27

## 2024-11-17 RX ORDER — OXYCODONE AND ACETAMINOPHEN 5; 325 MG/1; MG/1
1 TABLET ORAL
Status: COMPLETED | OUTPATIENT
Start: 2024-11-17 | End: 2024-11-17

## 2024-11-17 RX ORDER — TRAMADOL HYDROCHLORIDE 50 MG/1
50 TABLET ORAL
Status: COMPLETED | OUTPATIENT
Start: 2024-11-17 | End: 2024-11-17

## 2024-11-17 RX ADMIN — OXYCODONE HYDROCHLORIDE AND ACETAMINOPHEN 1 TABLET: 5; 325 TABLET ORAL at 13:57

## 2024-11-17 RX ADMIN — COLCHICINE 1.2 MG: 0.6 TABLET, FILM COATED ORAL at 14:18

## 2024-11-17 RX ADMIN — TRAMADOL HYDROCHLORIDE 50 MG: 50 TABLET ORAL at 12:50

## 2024-11-17 ASSESSMENT — PAIN DESCRIPTION - ORIENTATION
ORIENTATION: RIGHT

## 2024-11-17 ASSESSMENT — PAIN DESCRIPTION - DESCRIPTORS
DESCRIPTORS: DISCOMFORT
DESCRIPTORS: ACHING
DESCRIPTORS: DISCOMFORT

## 2024-11-17 ASSESSMENT — PAIN DESCRIPTION - LOCATION
LOCATION: LEG
LOCATION: FOOT

## 2024-11-17 ASSESSMENT — PAIN SCALES - GENERAL
PAINLEVEL_OUTOF10: 10

## 2024-11-17 NOTE — DISCHARGE INSTRUCTIONS
The condition from which you are suffering is called gout, this causes pain, warmth, redness to specific joints.  It seems to be affecting your toe.  Elevate your foot, do not apply a warm compress.  It may hurt for socks or bedsheets to even be placed on your foot.  The medication we have sent to your pharmacy assist with inflammation and gout.  You should also monitor your diet.  Please follow-up with your primary care doctor and return to the ER if you develop fever.

## 2024-11-17 NOTE — ED NOTES
Pt called out reporting the pain medication is not working and that she needs something else for the pain. This RN informed the pt that the medication will take 30-60 mins for the pain medication to take effect. Pt verbalized understanding and requested more pain medications. Provider made aware.

## 2024-11-17 NOTE — ED TRIAGE NOTES
Patient brought to triage via wheelchair.  C/O bilateral foot pain, swelling and itching x 24 hours.

## 2024-11-17 NOTE — ED PROVIDER NOTES
available at the time of this note:  XR FOOT RIGHT (MIN 3 VIEWS)   Final Result   No acute fracture or dislocation. Pes planus, calcaneal spurs, mild   hallux MTP osteoarthrosis, and mild medial soft tissue prominence at hallux MTP   joint/metatarsal head..      Electronically signed by Gabe Newton MD              PROCEDURES   Unless otherwise noted below, none  Procedures         CRITICAL CARE TIME       EMERGENCY DEPARTMENT COURSE and DIFFERENTIAL DIAGNOSIS/MDM   Vitals:    Vitals:    24 1200 24 1300 24 1400 24 1426   BP: 123/69 134/73 (!) 144/83    Pulse: 63 64 65    Resp: 18 18 18    Temp:    98.1 °F (36.7 °C)   TempSrc:    Oral   SpO2: 97% 98% 96%    Weight:       Height:           Patient was given the following medications:  Medications   traMADol (ULTRAM) tablet 50 mg (50 mg Oral Given 24 1250)   oxyCODONE-acetaminophen (PERCOCET) 5-325 MG per tablet 1 tablet (1 tablet Oral Given 24 1357)   colchicine (COLCRYS) tablet 1.2 mg (1.2 mg Oral Given 24 1418)         CONSULTS: (Who and What was discussed)  None    Chronic Conditions: CHF, high cholesterol, HTN, GERD    Social Determinants affecting Dx or Tx: none       Records Reviewed (source and summary): Old medical records.  Nursing notes.    ED COURSE  ED Course as of 24 0814   Sun 2024   1354 Patient is still reporting significant pain, although she appears to be resting comfortably, she has been on her call bell multiple times.  Reports no relief with tramadol.  Will give Percocet [EC]      ED Course User Index  [EC] Elodia Martinez PA-C       Medial Decision Makin y/o F presents with cc of right foot pain onset yesterday    DDX to include but not limited to:  Gouty arthritis, osteoarthritis, pseudogout, metatarsal fracture, PAD    Patient has intact distal pulses and cap refill. The right great toe at the MTP joint is with slight warmth to touch and tenderness with light palpation. The XR

## (undated) DEVICE — REM POLYHESIVE ADULT PATIENT RETURN ELECTRODE: Brand: VALLEYLAB

## (undated) DEVICE — TUBING, SUCTION, 1/4" X 12', STRAIGHT: Brand: MEDLINE

## (undated) DEVICE — GARMENT,MEDLINE,DVT,INT,CALF,MED, GEN2: Brand: MEDLINE

## (undated) DEVICE — MOUTHPIECE ENDOSCP 20X27MM --

## (undated) DEVICE — MAJ-1414 SINGLE USE ADPATER BIOPSY VALV: Brand: SINGLE USE ADAPTOR BIOPSY VALVE

## (undated) DEVICE — KENDALL RADIOLUCENT FOAM MONITORING ELECTRODE RECTANGULAR SHAPE: Brand: KENDALL

## (undated) DEVICE — Device

## (undated) DEVICE — SINGLE PORT MANIFOLD: Brand: NEPTUNE 2

## (undated) DEVICE — TRAP SPEC COLL POLYP POLYSTYR --

## (undated) DEVICE — SPONGE GZ W4XL4IN COT 12 PLY TYP VII WVN C FLD DSGN